# Patient Record
Sex: MALE | NOT HISPANIC OR LATINO | Employment: OTHER | URBAN - METROPOLITAN AREA
[De-identification: names, ages, dates, MRNs, and addresses within clinical notes are randomized per-mention and may not be internally consistent; named-entity substitution may affect disease eponyms.]

---

## 2025-02-28 ENCOUNTER — APPOINTMENT (OUTPATIENT)
Dept: RADIOLOGY | Facility: HOSPITAL | Age: 70
End: 2025-02-28
Payer: COMMERCIAL

## 2025-02-28 ENCOUNTER — HOSPITAL ENCOUNTER (OUTPATIENT)
Facility: HOSPITAL | Age: 70
Setting detail: OBSERVATION
Discharge: HOME | End: 2025-03-02
Attending: EMERGENCY MEDICINE | Admitting: INTERNAL MEDICINE
Payer: COMMERCIAL

## 2025-02-28 ENCOUNTER — APPOINTMENT (OUTPATIENT)
Dept: CARDIOLOGY | Facility: HOSPITAL | Age: 70
End: 2025-02-28
Payer: COMMERCIAL

## 2025-02-28 DIAGNOSIS — G47.33 OSA (OBSTRUCTIVE SLEEP APNEA): ICD-10-CM

## 2025-02-28 DIAGNOSIS — J44.1 CHRONIC OBSTRUCTIVE PULMONARY DISEASE WITH ACUTE EXACERBATION (MULTI): ICD-10-CM

## 2025-02-28 DIAGNOSIS — J96.11 CHRONIC RESPIRATORY FAILURE WITH HYPOXIA (MULTI): ICD-10-CM

## 2025-02-28 DIAGNOSIS — R07.9 CHEST PAIN, UNSPECIFIED TYPE: Primary | ICD-10-CM

## 2025-02-28 PROBLEM — I48.0 PAF (PAROXYSMAL ATRIAL FIBRILLATION) (MULTI): Status: ACTIVE | Noted: 2025-02-28

## 2025-02-28 PROBLEM — N18.30 STAGE 3 CHRONIC KIDNEY DISEASE (MULTI): Status: ACTIVE | Noted: 2025-02-28

## 2025-02-28 PROBLEM — I25.10 CAD (CORONARY ARTERY DISEASE): Status: ACTIVE | Noted: 2025-02-28

## 2025-02-28 PROBLEM — F41.9 ANXIETY: Status: ACTIVE | Noted: 2025-02-28

## 2025-02-28 PROBLEM — J44.9 COPD (CHRONIC OBSTRUCTIVE PULMONARY DISEASE) (MULTI): Status: ACTIVE | Noted: 2025-02-28

## 2025-02-28 PROBLEM — Z86.73 H/O: CVA (CEREBROVASCULAR ACCIDENT): Status: ACTIVE | Noted: 2025-02-28

## 2025-02-28 PROBLEM — Z86.718 H/O DEEP VENOUS THROMBOSIS: Status: ACTIVE | Noted: 2025-02-28

## 2025-02-28 PROBLEM — I50.30 (HFPEF) HEART FAILURE WITH PRESERVED EJECTION FRACTION: Status: ACTIVE | Noted: 2025-02-28

## 2025-02-28 PROBLEM — G40.909 SEIZURE DISORDER (MULTI): Status: ACTIVE | Noted: 2025-02-28

## 2025-02-28 LAB
ALBUMIN SERPL BCP-MCNC: 4 G/DL (ref 3.4–5)
ALP SERPL-CCNC: 91 U/L (ref 33–136)
ALT SERPL W P-5'-P-CCNC: 8 U/L (ref 10–52)
ANION GAP SERPL CALCULATED.3IONS-SCNC: 10 MMOL/L (ref 10–20)
AST SERPL W P-5'-P-CCNC: 14 U/L (ref 9–39)
BASOPHILS # BLD AUTO: 0.03 X10*3/UL (ref 0–0.1)
BASOPHILS NFR BLD AUTO: 0.5 %
BILIRUB SERPL-MCNC: 0.5 MG/DL (ref 0–1.2)
BNP SERPL-MCNC: 473 PG/ML (ref 0–99)
BUN SERPL-MCNC: 34 MG/DL (ref 6–23)
CALCIUM SERPL-MCNC: 9.2 MG/DL (ref 8.6–10.3)
CARDIAC TROPONIN I PNL SERPL HS: 16 NG/L (ref 0–20)
CARDIAC TROPONIN I PNL SERPL HS: 16 NG/L (ref 0–20)
CHLORIDE SERPL-SCNC: 99 MMOL/L (ref 98–107)
CO2 SERPL-SCNC: 34 MMOL/L (ref 21–32)
CREAT SERPL-MCNC: 1.34 MG/DL (ref 0.5–1.3)
EGFRCR SERPLBLD CKD-EPI 2021: 57 ML/MIN/1.73M*2
EOSINOPHIL # BLD AUTO: 0.12 X10*3/UL (ref 0–0.4)
EOSINOPHIL NFR BLD AUTO: 2 %
ERYTHROCYTE [DISTWIDTH] IN BLOOD BY AUTOMATED COUNT: 15.9 % (ref 11.5–14.5)
GLUCOSE SERPL-MCNC: 102 MG/DL (ref 74–99)
HCT VFR BLD AUTO: 42.8 % (ref 41–52)
HGB BLD-MCNC: 12.8 G/DL (ref 13.5–17.5)
IMM GRANULOCYTES # BLD AUTO: 0.01 X10*3/UL (ref 0–0.5)
IMM GRANULOCYTES NFR BLD AUTO: 0.2 % (ref 0–0.9)
LYMPHOCYTES # BLD AUTO: 0.6 X10*3/UL (ref 0.8–3)
LYMPHOCYTES NFR BLD AUTO: 10.2 %
MAGNESIUM SERPL-MCNC: 2.09 MG/DL (ref 1.6–2.4)
MCH RBC QN AUTO: 25.1 PG (ref 26–34)
MCHC RBC AUTO-ENTMCNC: 29.9 G/DL (ref 32–36)
MCV RBC AUTO: 84 FL (ref 80–100)
MONOCYTES # BLD AUTO: 0.65 X10*3/UL (ref 0.05–0.8)
MONOCYTES NFR BLD AUTO: 11 %
NEUTROPHILS # BLD AUTO: 4.48 X10*3/UL (ref 1.6–5.5)
NEUTROPHILS NFR BLD AUTO: 76.1 %
NRBC BLD-RTO: 0 /100 WBCS (ref 0–0)
PLATELET # BLD AUTO: 171 X10*3/UL (ref 150–450)
POTASSIUM SERPL-SCNC: 3.9 MMOL/L (ref 3.5–5.3)
PROT SERPL-MCNC: 6.2 G/DL (ref 6.4–8.2)
RBC # BLD AUTO: 5.09 X10*6/UL (ref 4.5–5.9)
SODIUM SERPL-SCNC: 139 MMOL/L (ref 136–145)
WBC # BLD AUTO: 5.9 X10*3/UL (ref 4.4–11.3)

## 2025-02-28 PROCEDURE — 71045 X-RAY EXAM CHEST 1 VIEW: CPT

## 2025-02-28 PROCEDURE — 93005 ELECTROCARDIOGRAM TRACING: CPT

## 2025-02-28 PROCEDURE — 96375 TX/PRO/DX INJ NEW DRUG ADDON: CPT | Mod: 59

## 2025-02-28 PROCEDURE — 93010 ELECTROCARDIOGRAM REPORT: CPT | Performed by: INTERNAL MEDICINE

## 2025-02-28 PROCEDURE — G0378 HOSPITAL OBSERVATION PER HR: HCPCS

## 2025-02-28 PROCEDURE — 2500000005 HC RX 250 GENERAL PHARMACY W/O HCPCS

## 2025-02-28 PROCEDURE — 2550000001 HC RX 255 CONTRASTS

## 2025-02-28 PROCEDURE — 99222 1ST HOSP IP/OBS MODERATE 55: CPT | Performed by: INTERNAL MEDICINE

## 2025-02-28 PROCEDURE — 80053 COMPREHEN METABOLIC PANEL: CPT

## 2025-02-28 PROCEDURE — 96374 THER/PROPH/DIAG INJ IV PUSH: CPT | Mod: 59

## 2025-02-28 PROCEDURE — 85025 COMPLETE CBC W/AUTO DIFF WBC: CPT

## 2025-02-28 PROCEDURE — 83880 ASSAY OF NATRIURETIC PEPTIDE: CPT | Performed by: EMERGENCY MEDICINE

## 2025-02-28 PROCEDURE — 71275 CT ANGIOGRAPHY CHEST: CPT

## 2025-02-28 PROCEDURE — 84484 ASSAY OF TROPONIN QUANT: CPT

## 2025-02-28 PROCEDURE — 2500000001 HC RX 250 WO HCPCS SELF ADMINISTERED DRUGS (ALT 637 FOR MEDICARE OP): Performed by: INTERNAL MEDICINE

## 2025-02-28 PROCEDURE — 36415 COLL VENOUS BLD VENIPUNCTURE: CPT

## 2025-02-28 PROCEDURE — 99285 EMERGENCY DEPT VISIT HI MDM: CPT | Mod: 25 | Performed by: EMERGENCY MEDICINE

## 2025-02-28 PROCEDURE — 2500000001 HC RX 250 WO HCPCS SELF ADMINISTERED DRUGS (ALT 637 FOR MEDICARE OP)

## 2025-02-28 PROCEDURE — 2500000002 HC RX 250 W HCPCS SELF ADMINISTERED DRUGS (ALT 637 FOR MEDICARE OP, ALT 636 FOR OP/ED): Performed by: INTERNAL MEDICINE

## 2025-02-28 PROCEDURE — 83735 ASSAY OF MAGNESIUM: CPT

## 2025-02-28 PROCEDURE — 2500000004 HC RX 250 GENERAL PHARMACY W/ HCPCS (ALT 636 FOR OP/ED)

## 2025-02-28 PROCEDURE — 96376 TX/PRO/DX INJ SAME DRUG ADON: CPT | Mod: 59

## 2025-02-28 PROCEDURE — 71045 X-RAY EXAM CHEST 1 VIEW: CPT | Performed by: RADIOLOGY

## 2025-02-28 RX ORDER — FUROSEMIDE 10 MG/ML
40 INJECTION INTRAMUSCULAR; INTRAVENOUS ONCE
Status: COMPLETED | OUTPATIENT
Start: 2025-02-28 | End: 2025-02-28

## 2025-02-28 RX ORDER — CARVEDILOL 3.12 MG/1
3.12 TABLET ORAL 2 TIMES DAILY
Status: DISCONTINUED | OUTPATIENT
Start: 2025-02-28 | End: 2025-03-02 | Stop reason: HOSPADM

## 2025-02-28 RX ORDER — SPIRONOLACTONE 50 MG/1
50 TABLET, FILM COATED ORAL DAILY
COMMUNITY

## 2025-02-28 RX ORDER — SPIRONOLACTONE 50 MG/1
50 TABLET, FILM COATED ORAL DAILY
Status: DISCONTINUED | OUTPATIENT
Start: 2025-03-01 | End: 2025-03-02 | Stop reason: HOSPADM

## 2025-02-28 RX ORDER — TRAMADOL HYDROCHLORIDE 50 MG/1
50 TABLET ORAL ONCE
Status: DISCONTINUED | OUTPATIENT
Start: 2025-02-28 | End: 2025-02-28

## 2025-02-28 RX ORDER — POTASSIUM CHLORIDE 20 MEQ/1
20 TABLET, EXTENDED RELEASE ORAL DAILY
COMMUNITY
End: 2025-03-02 | Stop reason: HOSPADM

## 2025-02-28 RX ORDER — ASPIRIN 81 MG/1
81 TABLET ORAL DAILY
Status: DISCONTINUED | OUTPATIENT
Start: 2025-03-01 | End: 2025-03-02 | Stop reason: HOSPADM

## 2025-02-28 RX ORDER — TORSEMIDE 10 MG/1
50 TABLET ORAL DAILY
COMMUNITY

## 2025-02-28 RX ORDER — FLUTICASONE PROPIONATE AND SALMETEROL 250; 50 UG/1; UG/1
1 POWDER RESPIRATORY (INHALATION)
COMMUNITY

## 2025-02-28 RX ORDER — ALLOPURINOL 300 MG/1
300 TABLET ORAL DAILY
COMMUNITY

## 2025-02-28 RX ORDER — ONDANSETRON HYDROCHLORIDE 2 MG/ML
4 INJECTION, SOLUTION INTRAVENOUS ONCE
Status: COMPLETED | OUTPATIENT
Start: 2025-02-28 | End: 2025-02-28

## 2025-02-28 RX ORDER — CARVEDILOL 3.12 MG/1
3.12 TABLET ORAL 2 TIMES DAILY
COMMUNITY

## 2025-02-28 RX ORDER — BUPROPION HYDROCHLORIDE 150 MG/1
150 TABLET ORAL DAILY
COMMUNITY

## 2025-02-28 RX ORDER — ACETAMINOPHEN 650 MG/1
650 SUPPOSITORY RECTAL EVERY 4 HOURS PRN
Status: DISCONTINUED | OUTPATIENT
Start: 2025-02-28 | End: 2025-03-02 | Stop reason: HOSPADM

## 2025-02-28 RX ORDER — GUAIFENESIN/DEXTROMETHORPHAN 100-10MG/5
5 SYRUP ORAL EVERY 4 HOURS PRN
Status: DISCONTINUED | OUTPATIENT
Start: 2025-02-28 | End: 2025-03-02 | Stop reason: HOSPADM

## 2025-02-28 RX ORDER — LOSARTAN POTASSIUM 50 MG/1
50 TABLET ORAL DAILY
Status: DISCONTINUED | OUTPATIENT
Start: 2025-03-01 | End: 2025-03-02 | Stop reason: HOSPADM

## 2025-02-28 RX ORDER — ATORVASTATIN CALCIUM 40 MG/1
40 TABLET, FILM COATED ORAL NIGHTLY
Status: DISCONTINUED | OUTPATIENT
Start: 2025-02-28 | End: 2025-03-02 | Stop reason: HOSPADM

## 2025-02-28 RX ORDER — ONDANSETRON 4 MG/1
4 TABLET, FILM COATED ORAL EVERY 8 HOURS PRN
Status: DISCONTINUED | OUTPATIENT
Start: 2025-02-28 | End: 2025-03-02 | Stop reason: HOSPADM

## 2025-02-28 RX ORDER — GUAIFENESIN 600 MG/1
600 TABLET, EXTENDED RELEASE ORAL EVERY 12 HOURS PRN
Status: DISCONTINUED | OUTPATIENT
Start: 2025-02-28 | End: 2025-03-02 | Stop reason: HOSPADM

## 2025-02-28 RX ORDER — BUPROPION HYDROCHLORIDE 150 MG/1
150 TABLET ORAL DAILY
Status: DISCONTINUED | OUTPATIENT
Start: 2025-03-01 | End: 2025-03-02 | Stop reason: HOSPADM

## 2025-02-28 RX ORDER — ALBUTEROL SULFATE 0.83 MG/ML
3 SOLUTION RESPIRATORY (INHALATION) EVERY 4 HOURS PRN
Status: DISCONTINUED | OUTPATIENT
Start: 2025-02-28 | End: 2025-03-02 | Stop reason: HOSPADM

## 2025-02-28 RX ORDER — LEVETIRACETAM 750 MG/1
750 TABLET ORAL 2 TIMES DAILY
COMMUNITY

## 2025-02-28 RX ORDER — ALUMINUM HYDROXIDE, MAGNESIUM HYDROXIDE, AND SIMETHICONE 1200; 120; 1200 MG/30ML; MG/30ML; MG/30ML
30 SUSPENSION ORAL ONCE
Status: COMPLETED | OUTPATIENT
Start: 2025-02-28 | End: 2025-02-28

## 2025-02-28 RX ORDER — POTASSIUM CHLORIDE 750 MG/1
10 TABLET, FILM COATED, EXTENDED RELEASE ORAL NIGHTLY
Status: DISCONTINUED | OUTPATIENT
Start: 2025-02-28 | End: 2025-03-02 | Stop reason: HOSPADM

## 2025-02-28 RX ORDER — POTASSIUM CHLORIDE 750 MG/1
10 TABLET, FILM COATED, EXTENDED RELEASE ORAL NIGHTLY
COMMUNITY

## 2025-02-28 RX ORDER — ACETAMINOPHEN 160 MG/5ML
650 SOLUTION ORAL EVERY 4 HOURS PRN
Status: DISCONTINUED | OUTPATIENT
Start: 2025-02-28 | End: 2025-03-02 | Stop reason: HOSPADM

## 2025-02-28 RX ORDER — LOSARTAN POTASSIUM 50 MG/1
50 TABLET ORAL DAILY
COMMUNITY

## 2025-02-28 RX ORDER — ATORVASTATIN CALCIUM 40 MG/1
40 TABLET, FILM COATED ORAL NIGHTLY
COMMUNITY

## 2025-02-28 RX ORDER — ALLOPURINOL 300 MG/1
300 TABLET ORAL DAILY
Status: DISCONTINUED | OUTPATIENT
Start: 2025-03-01 | End: 2025-03-02 | Stop reason: HOSPADM

## 2025-02-28 RX ORDER — HYDROMORPHONE HYDROCHLORIDE 1 MG/ML
1 INJECTION, SOLUTION INTRAMUSCULAR; INTRAVENOUS; SUBCUTANEOUS ONCE
Status: COMPLETED | OUTPATIENT
Start: 2025-02-28 | End: 2025-02-28

## 2025-02-28 RX ORDER — POLYETHYLENE GLYCOL 3350 17 G/17G
17 POWDER, FOR SOLUTION ORAL DAILY PRN
Status: DISCONTINUED | OUTPATIENT
Start: 2025-02-28 | End: 2025-03-02 | Stop reason: HOSPADM

## 2025-02-28 RX ORDER — ONDANSETRON HYDROCHLORIDE 2 MG/ML
4 INJECTION, SOLUTION INTRAVENOUS EVERY 8 HOURS PRN
Status: DISCONTINUED | OUTPATIENT
Start: 2025-02-28 | End: 2025-03-02 | Stop reason: HOSPADM

## 2025-02-28 RX ORDER — ACETAMINOPHEN 325 MG/1
650 TABLET ORAL EVERY 4 HOURS PRN
Status: DISCONTINUED | OUTPATIENT
Start: 2025-02-28 | End: 2025-03-02 | Stop reason: HOSPADM

## 2025-02-28 RX ORDER — LIDOCAINE HYDROCHLORIDE 20 MG/ML
15 SOLUTION OROPHARYNGEAL ONCE
Status: COMPLETED | OUTPATIENT
Start: 2025-02-28 | End: 2025-02-28

## 2025-02-28 RX ORDER — TRAZODONE HYDROCHLORIDE 100 MG/1
100 TABLET ORAL NIGHTLY
COMMUNITY

## 2025-02-28 RX ORDER — TRAZODONE HYDROCHLORIDE 100 MG/1
100 TABLET ORAL NIGHTLY
Status: DISCONTINUED | OUTPATIENT
Start: 2025-02-28 | End: 2025-03-02 | Stop reason: HOSPADM

## 2025-02-28 RX ORDER — MORPHINE SULFATE 4 MG/ML
4 INJECTION, SOLUTION INTRAMUSCULAR; INTRAVENOUS ONCE
Status: COMPLETED | OUTPATIENT
Start: 2025-02-28 | End: 2025-02-28

## 2025-02-28 RX ORDER — FAMOTIDINE 10 MG/ML
20 INJECTION, SOLUTION INTRAVENOUS ONCE
Status: COMPLETED | OUTPATIENT
Start: 2025-02-28 | End: 2025-02-28

## 2025-02-28 RX ORDER — FLUTICASONE FUROATE AND VILANTEROL 200; 25 UG/1; UG/1
1 POWDER RESPIRATORY (INHALATION)
Status: DISCONTINUED | OUTPATIENT
Start: 2025-03-01 | End: 2025-03-02 | Stop reason: HOSPADM

## 2025-02-28 RX ORDER — ASPIRIN 81 MG/1
81 TABLET ORAL DAILY
COMMUNITY

## 2025-02-28 RX ORDER — POLYETHYLENE GLYCOL 3350 17 G/17G
17 POWDER, FOR SOLUTION ORAL DAILY
COMMUNITY

## 2025-02-28 RX ORDER — IPRATROPIUM BROMIDE AND ALBUTEROL SULFATE 2.5; .5 MG/3ML; MG/3ML
3 SOLUTION RESPIRATORY (INHALATION) 4 TIMES DAILY PRN
COMMUNITY

## 2025-02-28 RX ORDER — TORSEMIDE 100 MG/1
50 TABLET ORAL DAILY
Status: DISCONTINUED | OUTPATIENT
Start: 2025-03-01 | End: 2025-03-01

## 2025-02-28 RX ADMIN — APIXABAN 5 MG: 5 TABLET, FILM COATED ORAL at 23:12

## 2025-02-28 RX ADMIN — LEVETIRACETAM 750 MG: 500 TABLET, FILM COATED ORAL at 23:12

## 2025-02-28 RX ADMIN — ALUMINUM HYDROXIDE, MAGNESIUM HYDROXIDE, AND DIMETHICONE 30 ML: 200; 20; 200 SUSPENSION ORAL at 19:12

## 2025-02-28 RX ADMIN — FUROSEMIDE 40 MG: 10 INJECTION, SOLUTION INTRAMUSCULAR; INTRAVENOUS at 21:13

## 2025-02-28 RX ADMIN — ATORVASTATIN CALCIUM 40 MG: 40 TABLET, FILM COATED ORAL at 23:11

## 2025-02-28 RX ADMIN — IOHEXOL 75 ML: 350 INJECTION, SOLUTION INTRAVENOUS at 21:45

## 2025-02-28 RX ADMIN — LIDOCAINE HYDROCHLORIDE 15 ML: 20 SOLUTION ORAL at 19:12

## 2025-02-28 RX ADMIN — MORPHINE SULFATE 4 MG: 4 INJECTION, SOLUTION INTRAMUSCULAR; INTRAVENOUS at 18:50

## 2025-02-28 RX ADMIN — ONDANSETRON 4 MG: 2 INJECTION, SOLUTION INTRAMUSCULAR; INTRAVENOUS at 18:50

## 2025-02-28 RX ADMIN — FAMOTIDINE 20 MG: 10 INJECTION, SOLUTION INTRAVENOUS at 19:12

## 2025-02-28 RX ADMIN — POTASSIUM CHLORIDE 10 MEQ: 750 TABLET, EXTENDED RELEASE ORAL at 23:12

## 2025-02-28 RX ADMIN — HYDROMORPHONE HYDROCHLORIDE 0.5 MG: 1 INJECTION, SOLUTION INTRAMUSCULAR; INTRAVENOUS; SUBCUTANEOUS at 22:35

## 2025-02-28 RX ADMIN — TRAZODONE HYDROCHLORIDE 100 MG: 100 TABLET ORAL at 23:12

## 2025-02-28 RX ADMIN — HYDROMORPHONE HYDROCHLORIDE 1 MG: 1 INJECTION, SOLUTION INTRAMUSCULAR; INTRAVENOUS; SUBCUTANEOUS at 19:55

## 2025-02-28 RX ADMIN — CARVEDILOL 3.12 MG: 3.12 TABLET, FILM COATED ORAL at 23:12

## 2025-02-28 SDOH — SOCIAL STABILITY: SOCIAL INSECURITY: ARE THERE ANY APPARENT SIGNS OF INJURIES/BEHAVIORS THAT COULD BE RELATED TO ABUSE/NEGLECT?: NO

## 2025-02-28 SDOH — SOCIAL STABILITY: SOCIAL INSECURITY: DOES ANYONE TRY TO KEEP YOU FROM HAVING/CONTACTING OTHER FRIENDS OR DOING THINGS OUTSIDE YOUR HOME?: NO

## 2025-02-28 SDOH — SOCIAL STABILITY: SOCIAL INSECURITY: HAVE YOU HAD ANY THOUGHTS OF HARMING ANYONE ELSE?: NO

## 2025-02-28 SDOH — SOCIAL STABILITY: SOCIAL INSECURITY: DO YOU FEEL UNSAFE GOING BACK TO THE PLACE WHERE YOU ARE LIVING?: NO

## 2025-02-28 SDOH — SOCIAL STABILITY: SOCIAL INSECURITY: ABUSE: ADULT

## 2025-02-28 SDOH — SOCIAL STABILITY: SOCIAL INSECURITY: DO YOU FEEL ANYONE HAS EXPLOITED OR TAKEN ADVANTAGE OF YOU FINANCIALLY OR OF YOUR PERSONAL PROPERTY?: NO

## 2025-02-28 SDOH — SOCIAL STABILITY: SOCIAL INSECURITY: WERE YOU ABLE TO COMPLETE ALL THE BEHAVIORAL HEALTH SCREENINGS?: YES

## 2025-02-28 SDOH — SOCIAL STABILITY: SOCIAL INSECURITY: HAVE YOU HAD THOUGHTS OF HARMING ANYONE ELSE?: NO

## 2025-02-28 SDOH — SOCIAL STABILITY: SOCIAL INSECURITY: ARE YOU OR HAVE YOU BEEN THREATENED OR ABUSED PHYSICALLY, EMOTIONALLY, OR SEXUALLY BY ANYONE?: NO

## 2025-02-28 SDOH — SOCIAL STABILITY: SOCIAL INSECURITY: HAS ANYONE EVER THREATENED TO HURT YOUR FAMILY OR YOUR PETS?: NO

## 2025-02-28 ASSESSMENT — LIFESTYLE VARIABLES
EVER HAD A DRINK FIRST THING IN THE MORNING TO STEADY YOUR NERVES TO GET RID OF A HANGOVER: NO
TOTAL SCORE: 0
HOW MANY STANDARD DRINKS CONTAINING ALCOHOL DO YOU HAVE ON A TYPICAL DAY: PATIENT DOES NOT DRINK
AUDIT-C TOTAL SCORE: 0
EVER FELT BAD OR GUILTY ABOUT YOUR DRINKING: NO
AUDIT-C TOTAL SCORE: 0
HAVE PEOPLE ANNOYED YOU BY CRITICIZING YOUR DRINKING: NO
HOW OFTEN DO YOU HAVE 6 OR MORE DRINKS ON ONE OCCASION: NEVER
HAVE YOU EVER FELT YOU SHOULD CUT DOWN ON YOUR DRINKING: NO
SKIP TO QUESTIONS 9-10: 1
HOW OFTEN DO YOU HAVE A DRINK CONTAINING ALCOHOL: NEVER

## 2025-02-28 ASSESSMENT — PAIN SCALES - GENERAL
PAINLEVEL_OUTOF10: 10 - WORST POSSIBLE PAIN
PAINLEVEL_OUTOF10: 10 - WORST POSSIBLE PAIN
PAINLEVEL_OUTOF10: 8

## 2025-02-28 ASSESSMENT — COGNITIVE AND FUNCTIONAL STATUS - GENERAL
DAILY ACTIVITIY SCORE: 24
MOBILITY SCORE: 24
PATIENT BASELINE BEDBOUND: NO

## 2025-02-28 ASSESSMENT — PAIN DESCRIPTION - DESCRIPTORS: DESCRIPTORS: ACHING;SHARP

## 2025-02-28 ASSESSMENT — PATIENT HEALTH QUESTIONNAIRE - PHQ9
2. FEELING DOWN, DEPRESSED OR HOPELESS: NOT AT ALL
SUM OF ALL RESPONSES TO PHQ9 QUESTIONS 1 & 2: 0
1. LITTLE INTEREST OR PLEASURE IN DOING THINGS: NOT AT ALL

## 2025-02-28 ASSESSMENT — HEART SCORE
RISK FACTORS: >2 RISK FACTORS OR HX OF ATHEROSCLEROTIC DISEASE
TROPONIN: LESS THAN OR EQUAL TO NORMAL LIMIT
HISTORY: MODERATELY SUSPICIOUS
AGE: 65+
RISK FACTORS: >2 RISK FACTORS OR HX OF ATHEROSCLEROTIC DISEASE
HISTORY: MODERATELY SUSPICIOUS
AGE: 65+
ECG: NORMAL
TROPONIN: LESS THAN OR EQUAL TO NORMAL LIMIT
HEART SCORE: 5
HEART SCORE: 5
ECG: NORMAL

## 2025-02-28 ASSESSMENT — ACTIVITIES OF DAILY LIVING (ADL)
HEARING - RIGHT EAR: FUNCTIONAL
BATHING: INDEPENDENT
HEARING - LEFT EAR: FUNCTIONAL
WALKS IN HOME: INDEPENDENT
TOILETING: INDEPENDENT
DRESSING YOURSELF: INDEPENDENT
ADEQUATE_TO_COMPLETE_ADL: YES
JUDGMENT_ADEQUATE_SAFELY_COMPLETE_DAILY_ACTIVITIES: YES
FEEDING YOURSELF: INDEPENDENT
GROOMING: INDEPENDENT
PATIENT'S MEMORY ADEQUATE TO SAFELY COMPLETE DAILY ACTIVITIES?: YES

## 2025-02-28 ASSESSMENT — COLUMBIA-SUICIDE SEVERITY RATING SCALE - C-SSRS
6. HAVE YOU EVER DONE ANYTHING, STARTED TO DO ANYTHING, OR PREPARED TO DO ANYTHING TO END YOUR LIFE?: NO
2. HAVE YOU ACTUALLY HAD ANY THOUGHTS OF KILLING YOURSELF?: NO
1. IN THE PAST MONTH, HAVE YOU WISHED YOU WERE DEAD OR WISHED YOU COULD GO TO SLEEP AND NOT WAKE UP?: NO

## 2025-02-28 ASSESSMENT — PAIN DESCRIPTION - PAIN TYPE: TYPE: ACUTE PAIN

## 2025-02-28 ASSESSMENT — PAIN DESCRIPTION - LOCATION: LOCATION: CHEST

## 2025-02-28 ASSESSMENT — PAIN - FUNCTIONAL ASSESSMENT
PAIN_FUNCTIONAL_ASSESSMENT: 0-10
PAIN_FUNCTIONAL_ASSESSMENT: 0-10

## 2025-02-28 NOTE — ED TRIAGE NOTES
BIBA for chest pain 9/10 since this morning radiating to L arm and L jaw. Hx 2 heart attacks, last one June 2023. Given 4 baby aspirin. Refused nitroglycerin d/t headaches with it

## 2025-02-28 NOTE — ED PROVIDER NOTES
HPI   Chief Complaint   Patient presents with    Chest Pain       Patient is a 69-year-old male presents emergency department for evaluation of chest pain.  Patient states chest pain started last night.  He describes it as a constant aching tightening pain in the left side of his chest.  He states the intensity of the pain fluctuates.  He states he has experienced pain before as he states he has had 2 heart attacks in the past.  He does admit to being on Eliquis.  He states nothing makes the pain better or worse.  He denies any fevers, chills, nausea, vomiting, abdominal pain.  He denies any recent travel or sick contacts.              Patient History   Past Medical History:   Diagnosis Date    DVT (deep venous thrombosis) (Multi)     Gout     History of pulmonary embolus (PE)     Hypertension     MI (myocardial infarction) (Multi)     Stroke (Multi)      Past Surgical History:   Procedure Laterality Date    MR HEAD ANGIO WO IV CONTRAST  7/7/2015    MR HEAD ANGIO WO IV CONTRAST [facility]    MR HEAD ANGIO WO IV CONTRAST  7/9/2015    MR HEAD ANGIO WO IV CONTRAST [facility]     No family history on file.  Social History     Tobacco Use    Smoking status: Former     Types: Cigarettes    Smokeless tobacco: Never   Substance Use Topics    Alcohol use: Not Currently    Drug use: Never       Physical Exam   ED Triage Vitals   Temp Pulse Resp BP   -- -- -- --      SpO2 Temp src Heart Rate Source Patient Position   -- -- -- --      BP Location FiO2 (%)     -- --       Physical Exam  Vitals and nursing note reviewed.   Constitutional:       Comments: Disheveled appearing   HENT:      Head: Normocephalic and atraumatic.      Nose: Nose normal.      Mouth/Throat:      Mouth: Mucous membranes are moist.   Eyes:      Extraocular Movements: Extraocular movements intact.      Pupils: Pupils are equal, round, and reactive to light.   Cardiovascular:      Rate and Rhythm: Normal rate and regular rhythm.    Pulmonary:      Effort: Pulmonary effort is normal.      Breath sounds: Normal breath sounds. No wheezing, rhonchi or rales.      Comments: Normal oxygen saturation on baseline 4 L  Abdominal:      Palpations: Abdomen is soft.      Tenderness: There is no abdominal tenderness. There is no guarding or rebound.   Musculoskeletal:         General: Normal range of motion.      Cervical back: Normal range of motion.   Skin:     General: Skin is warm and dry.      Comments: Color changes in the skin in the bilateral lower extremities consistent with PVD   Neurological:      General: No focal deficit present.      Mental Status: He is alert and oriented to person, place, and time.   Psychiatric:         Mood and Affect: Mood normal.         Behavior: Behavior normal.           ED Course & MDM   ED Course as of 02/28/25 2233 Fri Feb 28, 2025 1843 My EKG interpretation:  Sinus rhythm 69 bpm normal axis ME 1 200 QTc 503 no ectopy or acute ischemic changes noted [KW]      ED Course User Index  [KW] Gus Chris,          Diagnoses as of 02/28/25 2233   Chest pain, unspecified type   Chronic obstructive pulmonary disease with acute exacerbation (Multi)                 No data recorded     [place] Coma Scale Score: 15 (02/28/25 1922 : Noah Guevara RN) HEART Score: 5 (02/28/25 2233 : Tesha Cano PA-C)                         Medical Decision Making  **Disclaimer parts of this chart have been completed using voice recognition software. Please excuse any errors of transcription.     Patient seen in conjunction with attending physician .    HPI: Detailed above.    Exam: A medically appropriate exam performed, outlined above, given the known history and presentation.    History obtained from: Patient    EKG: Reviewed by myself.  Reviewed and interpreted by my attending physician.    Labs/Diagnostics:  Labs Reviewed   CBC WITH AUTO DIFFERENTIAL - Abnormal       Result Value    WBC 5.9      nRBC 0.0      RBC  5.09      Hemoglobin 12.8 (*)     Hematocrit 42.8      MCV 84      MCH 25.1 (*)     MCHC 29.9 (*)     RDW 15.9 (*)     Platelets 171      Neutrophils % 76.1      Immature Granulocytes %, Automated 0.2      Lymphocytes % 10.2      Monocytes % 11.0      Eosinophils % 2.0      Basophils % 0.5      Neutrophils Absolute 4.48      Immature Granulocytes Absolute, Automated 0.01      Lymphocytes Absolute 0.60 (*)     Monocytes Absolute 0.65      Eosinophils Absolute 0.12      Basophils Absolute 0.03     COMPREHENSIVE METABOLIC PANEL - Abnormal    Glucose 102 (*)     Sodium 139      Potassium 3.9      Chloride 99      Bicarbonate 34 (*)     Anion Gap 10      Urea Nitrogen 34 (*)     Creatinine 1.34 (*)     eGFR 57 (*)     Calcium 9.2      Albumin 4.0      Alkaline Phosphatase 91      Total Protein 6.2 (*)     AST 14      Bilirubin, Total 0.5      ALT 8 (*)    B-TYPE NATRIURETIC PEPTIDE - Abnormal     (*)     Narrative:        <100 pg/mL - Heart failure unlikely  100-299 pg/mL - Intermediate probability of acute heart                  failure exacerbation. Correlate with clinical                  context and patient history.    >=300 pg/mL - Heart Failure likely. Correlate with clinical                  context and patient history.    BNP testing is performed using different testing methodology at [facility] than at other [facility]. Direct result comparisons should only be made within the same method.      MAGNESIUM - Normal    Magnesium 2.09     SERIAL TROPONIN-INITIAL - Normal    Troponin I, High Sensitivity 16      Narrative:     Less than 99th percentile of normal range cutoff-  Female and children under 18 years old <14 ng/L; Male <21 ng/L: Negative  Repeat testing should be performed if clinically indicated.     Female and children under 18 years old 14-50 ng/L; Male 21-50 ng/L:  Consistent with possible cardiac damage and possible increased clinical   risk. Serial measurements may help to  assess extent of myocardial damage.     >50 ng/L: Consistent with cardiac damage, increased clinical risk and  myocardial infarction. Serial measurements may help assess extent of   myocardial damage.      NOTE: Children less than 1 year old may have higher baseline troponin   levels and results should be interpreted in conjunction with the overall   clinical context.     NOTE: Troponin I testing is performed using a different   testing methodology at [facility] than at other   [facility]. Direct result comparisons should only   be made within the same method.   SERIAL TROPONIN, 1 HOUR - Normal    Troponin I, High Sensitivity 16      Narrative:     Less than 99th percentile of normal range cutoff-  Female and children under 18 years old <14 ng/L; Male <21 ng/L: Negative  Repeat testing should be performed if clinically indicated.     Female and children under 18 years old 14-50 ng/L; Male 21-50 ng/L:  Consistent with possible cardiac damage and possible increased clinical   risk. Serial measurements may help to assess extent of myocardial damage.     >50 ng/L: Consistent with cardiac damage, increased clinical risk and  myocardial infarction. Serial measurements may help assess extent of   myocardial damage.      NOTE: Children less than 1 year old may have higher baseline troponin   levels and results should be interpreted in conjunction with the overall   clinical context.     NOTE: Troponin I testing is performed using a different   testing methodology at [facility] than at other   [facility]. Direct result comparisons should only   be made within the same method.   TROPONIN SERIES- (INITIAL, 1 HR)    Narrative:     The following orders were created for panel order Troponin I Series, High Sensitivity (0, 1 HR).  Procedure                               Abnormality         Status                     ---------                               -----------         ------                      Troponin I, High Sensiti...[[id number]]  Normal              Final result               Troponin, High Sensitivi...[[id number]]  Normal              Final result                 Please view results for these tests on the individual orders.     CT angio chest for pulmonary embolism   Final Result   1.  No evidence of pulmonary emboli to the proximal segmental level.   Evaluation of distal segmental and subsegmental branches is limited   due to mixing artifact.   2. Left-sided cardiomegaly. Enlarged main pulmonary trunk suggestive   of pulmonary arterial hypertension. Heavy coronary artery   atherosclerotic calcifications. Aneurysmal ascending aorta measuring   4.6 cm at the level of pulmonary trunk similar to prior.   3. Bibasilar bandlike and linear atelectasis.             Signed by: Denzel Garza 2/28/2025 10:22 PM   Dictation workstation:   [id number]      XR chest 1 view   Final Result   Cardiomegaly. Possible mild venous congestion. Low volume lungs.             Signed by: Leanne Becerra 2/28/2025 7:35 PM   Dictation workstation:   [id number]        EMERGENCY DEPARTMENT COURSE and DIFFERENTIAL DIAGNOSIS/MDM:  Patient is a 69-year-old male presenting to the emergency department for evaluation of chest pain.  On physical exam vital signs remarkable for hypertension but otherwise stable patient is in no acute distress.  Lung sounds clear auscultation bilaterally.  Patient has bilateral lower extremity swelling with signs of PVD.  He does have a history of ACS/CAD and therefore cardiac workup initiated.  BNP elevated at 473 therefore patient given 40 mg of IV Lasix.  Initial and repeat troponin 16.  CMP showed creatinine of 1.34 and EGFR of 57 which is consistent with previous labs.  Magnesium normal.  CBC showed no leukocytosis or anemia needing blood transfusion.  Chest x-ray showed cardiomegaly with venous congestion and low lung volumes.  CT angio of the chest requested by hospitalist.  CT angio showed no  "evidence of PE with findings consistent with pulmonary arterial hypertension and heavy coronary artery calcifications.  I spoke with hospitalist Dr. Fabian regarding the patient who agreed to admission to [facility] telemetry for further management of chest pain given patient heart score.    The patient presented with a chief complaint of chest pain. The differential diagnosis associated with this patient's presentation includes GERD, ACS, electrolyte abnormalities.     Vitals:    Vitals:    02/28/25 1829 02/28/25 1900 02/28/25 2000 02/28/25 2130   BP: (!) 149/93 143/89 115/60 125/78   Pulse: 70 66 66 78   Resp: 19 18 13 14   Temp: 36.8 °C (98.2 °F)      TempSrc: Temporal      SpO2: 96%  98% 96%   Weight: 147 kg (323 lb)      Height: 1.905 m (6' 3\")        History Limited by:    None    Independent history obtained from:    None    External records reviewed:    Inpatient Notes/Discharge Summary from previous hospitalization 1/10/2025 to determine patient's past medical history coronary artery disease CHF, iron deficiency anemia this insufficiency lower extremities    Diagnostics interpreted by me:    Xrays - see my independent interpretation in MDM    Discussions with other clinicians:    Hospitalist/Admitting Team Dr. Fabian    Chronic conditions impacting care:    Heart Disease    Social determinants of health affecting care:    None    Diagnostic tests considered but not performed: None    ED Medications managed:    Medications   traMADol (Ultram) tablet 50 mg (50 mg oral Not Given 2/28/25 1955)   HYDROmorphone (Dilaudid) injection 0.5 mg (has no administration in time range)   morphine injection 4 mg (4 mg intravenous Given 2/28/25 1850)   ondansetron (Zofran) injection 4 mg (4 mg intravenous Given 2/28/25 1850)   alum-mag hydroxide-simeth (Mylanta) 200-200-20 mg/5 mL oral suspension 30 mL (30 mL oral Given 2/28/25 1912)   lidocaine (Xylocaine) 2 % mouth solution 15 mL (15 mL oral Given 2/28/25 1912)   famotidine PF " (Pepcid) injection 20 mg (20 mg intravenous Given 2/28/25 1912)   HYDROmorphone (Dilaudid) injection 1 mg (1 mg intravenous Given 2/28/25 1955)   furosemide (Lasix) injection 40 mg (40 mg intravenous Given 2/28/25 2113)   iohexol (OMNIPaque) 350 mg iodine/mL solution 75 mL (75 mL intravenous Given 2/28/25 2145)       Prescription drugs considered:    None    Screenings:     HEART Score: 5          Procedure  Procedures     Tesha Cano PA-C  02/28/25 2428

## 2025-03-01 LAB
ALBUMIN SERPL BCP-MCNC: 4 G/DL (ref 3.4–5)
ALP SERPL-CCNC: 95 U/L (ref 33–136)
ALT SERPL W P-5'-P-CCNC: 8 U/L (ref 10–52)
ANION GAP SERPL CALCULATED.3IONS-SCNC: 9 MMOL/L (ref 10–20)
AST SERPL W P-5'-P-CCNC: 14 U/L (ref 9–39)
BASOPHILS # BLD AUTO: 0.04 X10*3/UL (ref 0–0.1)
BASOPHILS NFR BLD AUTO: 0.6 %
BILIRUB SERPL-MCNC: 0.5 MG/DL (ref 0–1.2)
BUN SERPL-MCNC: 29 MG/DL (ref 6–23)
CALCIUM SERPL-MCNC: 8.8 MG/DL (ref 8.6–10.3)
CHLORIDE SERPL-SCNC: 98 MMOL/L (ref 98–107)
CHOLEST SERPL-MCNC: 107 MG/DL (ref 0–199)
CHOLEST/HDLC SERPL: 3 {RATIO}
CO2 SERPL-SCNC: 37 MMOL/L (ref 21–32)
CREAT SERPL-MCNC: 1.29 MG/DL (ref 0.5–1.3)
EGFRCR SERPLBLD CKD-EPI 2021: 59 ML/MIN/1.73M*2
EOSINOPHIL # BLD AUTO: 0.17 X10*3/UL (ref 0–0.4)
EOSINOPHIL NFR BLD AUTO: 2.7 %
ERYTHROCYTE [DISTWIDTH] IN BLOOD BY AUTOMATED COUNT: 15.9 % (ref 11.5–14.5)
EST. AVERAGE GLUCOSE BLD GHB EST-MCNC: 105 MG/DL
GLUCOSE SERPL-MCNC: 95 MG/DL (ref 74–99)
HBA1C MFR BLD: 5.3 %
HCT VFR BLD AUTO: 45.4 % (ref 41–52)
HDLC SERPL-MCNC: 35.2 MG/DL
HGB BLD-MCNC: 13.2 G/DL (ref 13.5–17.5)
IMM GRANULOCYTES # BLD AUTO: 0.01 X10*3/UL (ref 0–0.5)
IMM GRANULOCYTES NFR BLD AUTO: 0.2 % (ref 0–0.9)
LDLC SERPL CALC-MCNC: 53 MG/DL
LYMPHOCYTES # BLD AUTO: 0.71 X10*3/UL (ref 0.8–3)
LYMPHOCYTES NFR BLD AUTO: 11.4 %
MCH RBC QN AUTO: 25.2 PG (ref 26–34)
MCHC RBC AUTO-ENTMCNC: 29.1 G/DL (ref 32–36)
MCV RBC AUTO: 87 FL (ref 80–100)
MONOCYTES # BLD AUTO: 0.68 X10*3/UL (ref 0.05–0.8)
MONOCYTES NFR BLD AUTO: 10.9 %
NEUTROPHILS # BLD AUTO: 4.64 X10*3/UL (ref 1.6–5.5)
NEUTROPHILS NFR BLD AUTO: 74.2 %
NON HDL CHOLESTEROL: 72 MG/DL (ref 0–149)
NRBC BLD-RTO: 0 /100 WBCS (ref 0–0)
PLATELET # BLD AUTO: 164 X10*3/UL (ref 150–450)
POTASSIUM SERPL-SCNC: 4.2 MMOL/L (ref 3.5–5.3)
PROT SERPL-MCNC: 6.6 G/DL (ref 6.4–8.2)
RBC # BLD AUTO: 5.23 X10*6/UL (ref 4.5–5.9)
SODIUM SERPL-SCNC: 140 MMOL/L (ref 136–145)
TRIGL SERPL-MCNC: 93 MG/DL (ref 0–149)
VLDL: 19 MG/DL (ref 0–40)
WBC # BLD AUTO: 6.3 X10*3/UL (ref 4.4–11.3)

## 2025-03-01 PROCEDURE — 94640 AIRWAY INHALATION TREATMENT: CPT

## 2025-03-01 PROCEDURE — 2500000005 HC RX 250 GENERAL PHARMACY W/O HCPCS: Performed by: NURSE PRACTITIONER

## 2025-03-01 PROCEDURE — 2500000004 HC RX 250 GENERAL PHARMACY W/ HCPCS (ALT 636 FOR OP/ED): Performed by: INTERNAL MEDICINE

## 2025-03-01 PROCEDURE — 2500000001 HC RX 250 WO HCPCS SELF ADMINISTERED DRUGS (ALT 637 FOR MEDICARE OP): Performed by: INTERNAL MEDICINE

## 2025-03-01 PROCEDURE — 2500000002 HC RX 250 W HCPCS SELF ADMINISTERED DRUGS (ALT 637 FOR MEDICARE OP, ALT 636 FOR OP/ED): Performed by: NURSE PRACTITIONER

## 2025-03-01 PROCEDURE — 85025 COMPLETE CBC W/AUTO DIFF WBC: CPT | Performed by: INTERNAL MEDICINE

## 2025-03-01 PROCEDURE — 99232 SBSQ HOSP IP/OBS MODERATE 35: CPT | Performed by: INTERNAL MEDICINE

## 2025-03-01 PROCEDURE — 80053 COMPREHEN METABOLIC PANEL: CPT | Performed by: INTERNAL MEDICINE

## 2025-03-01 PROCEDURE — 83036 HEMOGLOBIN GLYCOSYLATED A1C: CPT | Mod: WESLAB | Performed by: INTERNAL MEDICINE

## 2025-03-01 PROCEDURE — 99232 SBSQ HOSP IP/OBS MODERATE 35: CPT | Performed by: NURSE PRACTITIONER

## 2025-03-01 PROCEDURE — 96375 TX/PRO/DX INJ NEW DRUG ADDON: CPT

## 2025-03-01 PROCEDURE — 2500000002 HC RX 250 W HCPCS SELF ADMINISTERED DRUGS (ALT 637 FOR MEDICARE OP, ALT 636 FOR OP/ED): Performed by: INTERNAL MEDICINE

## 2025-03-01 PROCEDURE — G0378 HOSPITAL OBSERVATION PER HR: HCPCS

## 2025-03-01 PROCEDURE — 96376 TX/PRO/DX INJ SAME DRUG ADON: CPT

## 2025-03-01 PROCEDURE — 36415 COLL VENOUS BLD VENIPUNCTURE: CPT | Performed by: INTERNAL MEDICINE

## 2025-03-01 PROCEDURE — 80061 LIPID PANEL: CPT | Performed by: INTERNAL MEDICINE

## 2025-03-01 PROCEDURE — 2500000004 HC RX 250 GENERAL PHARMACY W/ HCPCS (ALT 636 FOR OP/ED): Performed by: NURSE PRACTITIONER

## 2025-03-01 PROCEDURE — 99223 1ST HOSP IP/OBS HIGH 75: CPT | Performed by: INTERNAL MEDICINE

## 2025-03-01 PROCEDURE — 2500000001 HC RX 250 WO HCPCS SELF ADMINISTERED DRUGS (ALT 637 FOR MEDICARE OP): Performed by: NURSE PRACTITIONER

## 2025-03-01 RX ORDER — HYDROMORPHONE HYDROCHLORIDE 1 MG/ML
1 INJECTION, SOLUTION INTRAMUSCULAR; INTRAVENOUS; SUBCUTANEOUS
Status: DISCONTINUED | OUTPATIENT
Start: 2025-03-01 | End: 2025-03-01

## 2025-03-01 RX ORDER — OXYCODONE AND ACETAMINOPHEN 5; 325 MG/1; MG/1
1 TABLET ORAL EVERY 6 HOURS PRN
Status: DISCONTINUED | OUTPATIENT
Start: 2025-03-01 | End: 2025-03-01

## 2025-03-01 RX ORDER — OXYCODONE AND ACETAMINOPHEN 5; 325 MG/1; MG/1
1 TABLET ORAL EVERY 4 HOURS PRN
Status: DISCONTINUED | OUTPATIENT
Start: 2025-03-01 | End: 2025-03-02 | Stop reason: HOSPADM

## 2025-03-01 RX ORDER — OXYCODONE AND ACETAMINOPHEN 5; 325 MG/1; MG/1
2 TABLET ORAL EVERY 4 HOURS PRN
Status: DISCONTINUED | OUTPATIENT
Start: 2025-03-01 | End: 2025-03-02 | Stop reason: HOSPADM

## 2025-03-01 RX ORDER — TORSEMIDE 100 MG/1
50 TABLET ORAL DAILY
Status: DISCONTINUED | OUTPATIENT
Start: 2025-03-01 | End: 2025-03-02 | Stop reason: HOSPADM

## 2025-03-01 RX ORDER — ALBUTEROL SULFATE 0.83 MG/ML
2.5 SOLUTION RESPIRATORY (INHALATION) 3 TIMES DAILY
Status: DISCONTINUED | OUTPATIENT
Start: 2025-03-01 | End: 2025-03-02 | Stop reason: HOSPADM

## 2025-03-01 RX ORDER — KETOROLAC TROMETHAMINE 30 MG/ML
15 INJECTION, SOLUTION INTRAMUSCULAR; INTRAVENOUS ONCE
Status: COMPLETED | OUTPATIENT
Start: 2025-03-01 | End: 2025-03-01

## 2025-03-01 RX ADMIN — LEVETIRACETAM 750 MG: 500 TABLET, FILM COATED ORAL at 08:52

## 2025-03-01 RX ADMIN — SPIRONOLACTONE 50 MG: 50 TABLET ORAL at 08:52

## 2025-03-01 RX ADMIN — ATORVASTATIN CALCIUM 40 MG: 40 TABLET, FILM COATED ORAL at 21:01

## 2025-03-01 RX ADMIN — POTASSIUM CHLORIDE 10 MEQ: 750 TABLET, EXTENDED RELEASE ORAL at 21:01

## 2025-03-01 RX ADMIN — OXYCODONE HYDROCHLORIDE AND ACETAMINOPHEN 1 TABLET: 5; 325 TABLET ORAL at 11:51

## 2025-03-01 RX ADMIN — OXYCODONE HYDROCHLORIDE AND ACETAMINOPHEN 2 TABLET: 5; 325 TABLET ORAL at 22:45

## 2025-03-01 RX ADMIN — APIXABAN 5 MG: 5 TABLET, FILM COATED ORAL at 08:52

## 2025-03-01 RX ADMIN — HYDROMORPHONE HYDROCHLORIDE 1 MG: 1 INJECTION, SOLUTION INTRAMUSCULAR; INTRAVENOUS; SUBCUTANEOUS at 01:28

## 2025-03-01 RX ADMIN — KETOROLAC TROMETHAMINE 15 MG: 30 INJECTION, SOLUTION INTRAMUSCULAR at 15:33

## 2025-03-01 RX ADMIN — LEVETIRACETAM 750 MG: 500 TABLET, FILM COATED ORAL at 21:01

## 2025-03-01 RX ADMIN — LOSARTAN POTASSIUM 50 MG: 50 TABLET, FILM COATED ORAL at 08:53

## 2025-03-01 RX ADMIN — HYDROMORPHONE HYDROCHLORIDE 1 MG: 1 INJECTION, SOLUTION INTRAMUSCULAR; INTRAVENOUS; SUBCUTANEOUS at 05:04

## 2025-03-01 RX ADMIN — CARVEDILOL 3.12 MG: 3.12 TABLET, FILM COATED ORAL at 08:52

## 2025-03-01 RX ADMIN — POLYETHYLENE GLYCOL 3350 17 G: 17 POWDER, FOR SOLUTION ORAL at 01:34

## 2025-03-01 RX ADMIN — ASPIRIN 81 MG: 81 TABLET, COATED ORAL at 08:52

## 2025-03-01 RX ADMIN — APIXABAN 5 MG: 5 TABLET, FILM COATED ORAL at 21:01

## 2025-03-01 RX ADMIN — ALBUTEROL SULFATE 2.5 MG: 2.5 SOLUTION RESPIRATORY (INHALATION) at 20:01

## 2025-03-01 RX ADMIN — TORSEMIDE 50 MG: 100 TABLET ORAL at 08:52

## 2025-03-01 RX ADMIN — ALLOPURINOL 300 MG: 300 TABLET ORAL at 08:52

## 2025-03-01 RX ADMIN — TRAZODONE HYDROCHLORIDE 100 MG: 100 TABLET ORAL at 21:01

## 2025-03-01 RX ADMIN — BUPROPION HYDROCHLORIDE 150 MG: 150 TABLET, EXTENDED RELEASE ORAL at 08:53

## 2025-03-01 RX ADMIN — Medication 4 L/MIN: at 20:09

## 2025-03-01 RX ADMIN — OXYCODONE HYDROCHLORIDE AND ACETAMINOPHEN 1 TABLET: 5; 325 TABLET ORAL at 14:11

## 2025-03-01 RX ADMIN — Medication 4 L/MIN: at 20:04

## 2025-03-01 RX ADMIN — OXYCODONE HYDROCHLORIDE AND ACETAMINOPHEN 2 TABLET: 5; 325 TABLET ORAL at 18:48

## 2025-03-01 RX ADMIN — ACETAMINOPHEN 650 MG: 325 TABLET, FILM COATED ORAL at 21:01

## 2025-03-01 ASSESSMENT — COGNITIVE AND FUNCTIONAL STATUS - GENERAL
MOBILITY SCORE: 24
DAILY ACTIVITIY SCORE: 24

## 2025-03-01 ASSESSMENT — PAIN SCALES - GENERAL
PAINLEVEL_OUTOF10: 8
PAINLEVEL_OUTOF10: 7
PAINLEVEL_OUTOF10: 7
PAINLEVEL_OUTOF10: 0 - NO PAIN
PAINLEVEL_OUTOF10: 8
PAINLEVEL_OUTOF10: 7
PAINLEVEL_OUTOF10: 10 - WORST POSSIBLE PAIN
PAINLEVEL_OUTOF10: 8
PAINLEVEL_OUTOF10: 10 - WORST POSSIBLE PAIN
PAINLEVEL_OUTOF10: 7
PAINLEVEL_OUTOF10: 3

## 2025-03-01 ASSESSMENT — PAIN - FUNCTIONAL ASSESSMENT
PAIN_FUNCTIONAL_ASSESSMENT: 0-10

## 2025-03-01 ASSESSMENT — PAIN DESCRIPTION - ORIENTATION
ORIENTATION: MID

## 2025-03-01 ASSESSMENT — PAIN DESCRIPTION - DESCRIPTORS
DESCRIPTORS: ACHING;SHARP
DESCRIPTORS: ACHING
DESCRIPTORS: ACHING;SHARP
DESCRIPTORS: ACHING
DESCRIPTORS: ACHING

## 2025-03-01 ASSESSMENT — PAIN DESCRIPTION - LOCATION
LOCATION: CHEST

## 2025-03-01 NOTE — H&P
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 Device (with Compatible Computer + Software) and as such may contain Grammatical Errors and Unintentional Typing Misprints.      I spent 38 minutes in the professional and overall care of this patient.      Ishmael Fabian MD

## 2025-03-01 NOTE — ASSESSMENT & PLAN NOTE
Slightly decompensated  Given IV Lasix in the ED  Lifestyle modification  Daily weights  Continue torsemide at 50 Mg daily  May take extra 50 mg if experiencing increased shortness of breath peripheral edema or weight gain of 3 pounds in higher over 24 hours per PCP  Continue spironolactone, losartan, Jardiance, monitor renal function, monitor electrolytes

## 2025-03-01 NOTE — ASSESSMENT & PLAN NOTE
Given IV Lasix in the ED  Continue torsemide at 50 Mg daily  Continue spironolactone, losartan, Jardiance, monitor renal function, monitor electrolytes

## 2025-03-01 NOTE — ASSESSMENT & PLAN NOTE
Admit patient to Telemetry service   Continuous Cardiac Monitor and BP Monitor Placement   Cardiology evaluation in AM.   Cardiac enzymes are negative (x 2 set) for acute MI.   Follow up second set CE (Tropinin + CK)   Monitor Electrolytes, Keep K+>4 + Mg++>2.   EKG reviewed  Will keep patient NPO  Continue High Intensity Statin in view of existing CAD   Defer repeat 2D-Echocardiography to evaluate for LVEF, Regional Wall motion abnormalities, and any Valvular defects.   Patient has allergy to nitroglycerin  May benefit from Ranexa as he has chronic chest pain.  Will defer to cardiology.  Patient is specifically requesting Dilaudid for his chest pain  [facility] report reviewed  Appears patient was last prescribed oxycodone 5 mg IR on November 12, 2024 for a total of 9 pills

## 2025-03-01 NOTE — PROGRESS NOTES
"Kapil MICHAEL Child is a 69 y.o. male on day 0 of admission presenting with Chest pain, unspecified type.    Subjective   This morning, patient was feeling and was asking to be discharged. Now, patient with increased pain, not getting much relief with oral percocet 5 mg. Denies shortness of breath, abdominal pain, fevers, chills. Wife at bedside, questions answered.        Objective     Physical Exam  Constitutional:       Appearance: Normal appearance.   HENT:      Head: Normocephalic and atraumatic.      Mouth/Throat:      Mouth: Mucous membranes are moist.      Pharynx: Oropharynx is clear.   Eyes:      Extraocular Movements: Extraocular movements intact.      Conjunctiva/sclera: Conjunctivae normal.      Pupils: Pupils are equal, round, and reactive to light.   Cardiovascular:      Rate and Rhythm: Normal rate and regular rhythm.      Pulses: Normal pulses.      Heart sounds: Normal heart sounds.   Pulmonary:      Effort: Tachypnea present.      Breath sounds: Normal breath sounds.   Abdominal:      General: Bowel sounds are normal.      Palpations: Abdomen is soft.      Tenderness: There is no abdominal tenderness.   Musculoskeletal:         General: Normal range of motion.      Cervical back: Normal range of motion and neck supple.   Skin:     General: Skin is warm and dry.      Capillary Refill: Capillary refill takes less than 2 seconds.   Neurological:      General: No focal deficit present.      Mental Status: He is alert and oriented to person, place, and time.   Psychiatric:         Mood and Affect: Mood normal.         Behavior: Behavior normal.         Last Recorded Vitals  Blood pressure (!) 107/96, pulse 73, temperature 36.7 °C (98.1 °F), temperature source Oral, resp. rate 20, height 1.905 m (6' 3\"), weight 147 kg (323 lb), SpO2 99%.  Intake/Output last 3 Shifts:  I/O last 3 completed shifts:  In: 120 (0.8 mL/kg) [P.O.:120]  Out: 1150 (7.8 mL/kg) [Urine:1150 (0.2 mL/kg/hr)]  Weight: 146.5 kg "     Relevant Results  Results for orders placed or performed during the hospital encounter of 02/28/25 (from the past 24 hours)   CBC and Auto Differential   Result Value Ref Range    WBC 5.9 4.4 - 11.3 x10*3/uL    nRBC 0.0 0.0 - 0.0 /100 WBCs    RBC 5.09 4.50 - 5.90 x10*6/uL    Hemoglobin 12.8 (L) 13.5 - 17.5 g/dL    Hematocrit 42.8 41.0 - 52.0 %    MCV 84 80 - 100 fL    MCH 25.1 (L) 26.0 - 34.0 pg    MCHC 29.9 (L) 32.0 - 36.0 g/dL    RDW 15.9 (H) 11.5 - 14.5 %    Platelets 171 150 - 450 x10*3/uL    Neutrophils % 76.1 40.0 - 80.0 %    Immature Granulocytes %, Automated 0.2 0.0 - 0.9 %    Lymphocytes % 10.2 13.0 - 44.0 %    Monocytes % 11.0 2.0 - 10.0 %    Eosinophils % 2.0 0.0 - 6.0 %    Basophils % 0.5 0.0 - 2.0 %    Neutrophils Absolute 4.48 1.60 - 5.50 x10*3/uL    Immature Granulocytes Absolute, Automated 0.01 0.00 - 0.50 x10*3/uL    Lymphocytes Absolute 0.60 (L) 0.80 - 3.00 x10*3/uL    Monocytes Absolute 0.65 0.05 - 0.80 x10*3/uL    Eosinophils Absolute 0.12 0.00 - 0.40 x10*3/uL    Basophils Absolute 0.03 0.00 - 0.10 x10*3/uL   Comprehensive metabolic panel   Result Value Ref Range    Glucose 102 (H) 74 - 99 mg/dL    Sodium 139 136 - 145 mmol/L    Potassium 3.9 3.5 - 5.3 mmol/L    Chloride 99 98 - 107 mmol/L    Bicarbonate 34 (H) 21 - 32 mmol/L    Anion Gap 10 10 - 20 mmol/L    Urea Nitrogen 34 (H) 6 - 23 mg/dL    Creatinine 1.34 (H) 0.50 - 1.30 mg/dL    eGFR 57 (L) >60 mL/min/1.73m*2    Calcium 9.2 8.6 - 10.3 mg/dL    Albumin 4.0 3.4 - 5.0 g/dL    Alkaline Phosphatase 91 33 - 136 U/L    Total Protein 6.2 (L) 6.4 - 8.2 g/dL    AST 14 9 - 39 U/L    Bilirubin, Total 0.5 0.0 - 1.2 mg/dL    ALT 8 (L) 10 - 52 U/L   Magnesium   Result Value Ref Range    Magnesium 2.09 1.60 - 2.40 mg/dL   Troponin I, High Sensitivity, Initial   Result Value Ref Range    Troponin I, High Sensitivity 16 0 - 20 ng/L   B-Type Natriuretic Peptide   Result Value Ref Range     (H) 0 - 99 pg/mL   Troponin, High Sensitivity, 1 Hour    Result Value Ref Range    Troponin I, High Sensitivity 16 0 - 20 ng/L   CBC and Auto Differential   Result Value Ref Range    WBC 6.3 4.4 - 11.3 x10*3/uL    nRBC 0.0 0.0 - 0.0 /100 WBCs    RBC 5.23 4.50 - 5.90 x10*6/uL    Hemoglobin 13.2 (L) 13.5 - 17.5 g/dL    Hematocrit 45.4 41.0 - 52.0 %    MCV 87 80 - 100 fL    MCH 25.2 (L) 26.0 - 34.0 pg    MCHC 29.1 (L) 32.0 - 36.0 g/dL    RDW 15.9 (H) 11.5 - 14.5 %    Platelets 164 150 - 450 x10*3/uL    Neutrophils % 74.2 40.0 - 80.0 %    Immature Granulocytes %, Automated 0.2 0.0 - 0.9 %    Lymphocytes % 11.4 13.0 - 44.0 %    Monocytes % 10.9 2.0 - 10.0 %    Eosinophils % 2.7 0.0 - 6.0 %    Basophils % 0.6 0.0 - 2.0 %    Neutrophils Absolute 4.64 1.60 - 5.50 x10*3/uL    Immature Granulocytes Absolute, Automated 0.01 0.00 - 0.50 x10*3/uL    Lymphocytes Absolute 0.71 (L) 0.80 - 3.00 x10*3/uL    Monocytes Absolute 0.68 0.05 - 0.80 x10*3/uL    Eosinophils Absolute 0.17 0.00 - 0.40 x10*3/uL    Basophils Absolute 0.04 0.00 - 0.10 x10*3/uL   Comprehensive metabolic panel   Result Value Ref Range    Glucose 95 74 - 99 mg/dL    Sodium 140 136 - 145 mmol/L    Potassium 4.2 3.5 - 5.3 mmol/L    Chloride 98 98 - 107 mmol/L    Bicarbonate 37 (H) 21 - 32 mmol/L    Anion Gap 9 (L) 10 - 20 mmol/L    Urea Nitrogen 29 (H) 6 - 23 mg/dL    Creatinine 1.29 0.50 - 1.30 mg/dL    eGFR 59 (L) >60 mL/min/1.73m*2    Calcium 8.8 8.6 - 10.3 mg/dL    Albumin 4.0 3.4 - 5.0 g/dL    Alkaline Phosphatase 95 33 - 136 U/L    Total Protein 6.6 6.4 - 8.2 g/dL    AST 14 9 - 39 U/L    Bilirubin, Total 0.5 0.0 - 1.2 mg/dL    ALT 8 (L) 10 - 52 U/L   Lipid panel   Result Value Ref Range    Cholesterol 107 0 - 199 mg/dL    HDL-Cholesterol 35.2 mg/dL    Cholesterol/HDL Ratio 3.0     LDL Calculated 53 <=99 mg/dL    VLDL 19 0 - 40 mg/dL    Triglycerides 93 0 - 149 mg/dL    Non HDL Cholesterol 72 0 - 149 mg/dL     CT angio chest for pulmonary embolism    Result Date: 2/28/2025  Interpreted By:  Denzel Garza, STUDY: CT  ANGIO CHEST FOR PULMONARY EMBOLISM;  2/28/2025 9:50 pm   INDICATION: Signs/Symptoms:rule out PE.   COMPARISON: Chest CT 06/24/2022.   ACCESSION NUMBER(S): [id number]   ORDERING CLINICIAN: DAPHNE CARTER   TECHNIQUE: Helical data acquisition of the chest was obtained after administration of intravenous contrast as part of pulmonary CT angiography protocol.   Axial contiguous images were reformatted in coronal and sagittal planes. Axial and coronal MIP images were created and reviewed.   FINDINGS: POTENTIAL LIMITATIONS OF THE STUDY: Motion and mixing artifact which limits evaluation of the distal branch vessels.   HEART AND VESSELS: No discrete filling defects within the main pulmonary artery or its branches.   Main pulmonary trunk is enlarged measuring 3.9 cm suggestive of pulmonary arterial hypertension.   The thoracic aorta is aneurysmal measuring 4.6 cm at the level of pulmonary trunk and 4.3 cm at the level of aortic root.   Heavy coronary artery calcifications are seen.The study is not optimized for evaluation of coronary arteries.   There is left-sided cardiomegaly with left ventricular and atrial enlargement similar to prior.   No evidence of pericardial effusion.   MEDIASTINUM AND NAZARIO, LOWER NECK AND AXILLA: The visualized thyroid gland is within normal limits.   No evidence of thoracic lymphadenopathy by CT criteria.   Esophagus appears within normal limits as seen.   LUNGS AND AIRWAYS: The trachea and central airways are patent. No endobronchial lesion.   Bibasilar subsegmental linear and band like atelectasis. There is no pleural effusion or pneumothorax.   UPPER ABDOMEN: Cholecystectomy change.   CHEST WALL AND OSSEOUS STRUCTURES: There are no suspicious osseous lesions there is mild endplate degenerative change throughout thoracic spine. Mild chronic T4 compression deformity..       1.  No evidence of pulmonary emboli to the proximal segmental level. Evaluation of distal segmental and subsegmental  branches is limited due to mixing artifact. 2. Left-sided cardiomegaly. Enlarged main pulmonary trunk suggestive of pulmonary arterial hypertension. Heavy coronary artery atherosclerotic calcifications. Aneurysmal ascending aorta measuring 4.6 cm at the level of pulmonary trunk similar to prior. 3. Bibasilar bandlike and linear atelectasis.     Signed by: Denzel Garza 2/28/2025 10:22 PM Dictation workstation:   [id number]    XR chest 1 view    Result Date: 2/28/2025  Interpreted By:  Leanne Becerra, STUDY: XR CHEST 1 VIEW; ;  2/28/2025 7:13 pm   INDICATION: Signs/Symptoms:chest pain.   COMPARISON: 04/28/2023   ACCESSION NUMBER(S): [id number]   ORDERING CLINICIAN: DAPHNE CARTER   TECHNIQUE: Portable chest   FINDINGS: Low volume lungs. Mildly prominent venous interstitium. No consolidation, pleural effusion, or pneumothorax. Surgical changes in the right shoulder. Healed left clavicle fracture.. Enlarged heart size and mediastinal contours are unchanged, tortuous aorta.       Cardiomegaly. Possible mild venous congestion. Low volume lungs.     Signed by: Leanne Becerra 2/28/2025 7:35 PM Dictation workstation:   [id number]       Assessment/Plan   Assessment & Plan  Chest pain, unspecified type  Monitor telemetry   Cardiology consult. Appreciate recs; signed off   Cardiac enzymes are negative (x 2 set).  Continue statin   Patient intolerant to nitroglycerin  [facility] report reviewed     (HFpEF) heart failure with preserved ejection fraction  Given IV Lasix in the ED  Continue torsemide at 50 Mg daily  Continue spironolactone, losartan, Jardiance, monitor renal function, monitor electrolytes    Chronic respiratory failure with hypoxia (Multi)  4 lpm at baseline   Stable     CAD (coronary artery disease)  NSTEMI 6/2023 s/p PCI of OM1 and OM2    Most recent stress test without stress-induced ischemia  Continue medical management with aspirin, atorvastatin  Cardiology consulted     Stage 3 chronic kidney disease (Multi)  Avoid  nephrotoxic agents    COPD (chronic obstructive pulmonary disease) (Multi)  Continue current inhaler regimen    PAF (paroxysmal atrial fibrillation) (Multi)  Rate controlled   Continue Coreg   Eliquis for stroke prophylaxis     Anxiety  Continue Wellbutrin 300 mg daily     Seizure disorder (Multi)  Seizure precautions  Continue Keppra 750 mg twice daily     H/O: CVA (cerebrovascular accident)  Continue DOAC for stroke prophylaxis    RASHID (obstructive sleep apnea)  Wears BIPAP at bedtime at home  Wife brought home machine, but mask appears damaged   Request respiratory to bring machine to bedside     H/O deep venous thrombosis  Continue DOAC for VTE treatment      Lorraine Felix, APRN-CNP

## 2025-03-01 NOTE — ASSESSMENT & PLAN NOTE
NSTEMI 6/2023 s/p PCI of OM1 and OM2    Status post stent  Most recent stress test without stress-induced ischemia  Continue medical management with aspirin, atorvastati

## 2025-03-01 NOTE — ASSESSMENT & PLAN NOTE
Wears BIPAP at bedtime at home  Wife brought home machine, but mask appears damaged   Request respiratory to bring machine to bedside

## 2025-03-01 NOTE — CONSULTS
Inpatient consult to Cardiology  Consult performed by: Elizabeth Parsons PA-C  Consult ordered by: Ishmael Fabian MD  Reason for consult: chest pain      History Of Present Illness:    Kapil MICHAEL Child is a 69 y.o. male presenting with past medical history of paroxysmal atrial fibrillation on Eliquis, MI 6/2023 s/p PCI of OM1 and OM2, dyslipidemia, COPD, chronic kidney disease stage III, coronary artery disease, Seizure disorder, chronic respiratory failure with hypoxia, with 9/10 inferior central chest pain radiating to his left arm and shoulder.  Patient reports his pain was worse with inhalation.  Troponins were negative, flat, 16,16, BNP mildly elevated 473. EKG taken in the ER shows sinus rhythm no signs of ischemia, heart rate 69, widening of the QRS in aVL.  CT angiogram demonstrated left-sided cardiomegaly, enlarged main pulmonary trunk suggestive of pulmonary arterial hypertension, heavy coronary artery atherosclerosis calcifications, and basilar bandlike and linear atelectasis, no pulmonary emboli.  Patient is a former smoker, quit in 1991. Cardiology was consulted for chest pain.     Last Recorded Vitals:  Vitals:    02/28/25 2000 02/28/25 2130 02/28/25 2346 03/01/25 0800   BP: 115/60 125/78 (!) 145/98 108/56   BP Location:   Left arm Right arm   Patient Position:   Sitting Lying   Pulse: 66 78 110 94   Resp: 13 14 18 18   Temp:   36.6 °C (97.9 °F) 36.2 °C (97.2 °F)   TempSrc:   Oral Oral   SpO2: 98% 96% 94% 95%   Weight:       Height:           Last Labs:  CBC - 3/1/2025:  5:47 AM  6.3 13.2 164    45.4      CMP - 3/1/2025:  5:47 AM  8.8 6.6 14 --- 0.5   _ 4.0 8 95      PTT - No results in last year.  _   _ _     Troponin I, High Sensitivity   Date/Time Value Ref Range Status   02/28/2025 07:50 PM 16 0 - 20 ng/L Final   02/28/2025 06:46 PM 16 0 - 20 ng/L Final     BNP   Date/Time Value Ref Range Status   02/28/2025 06:46  (H) 0 - 99 pg/mL Final     Hemoglobin A1C   Date/Time Value Ref Range Status    07/10/2024 02:13 PM 5.6 4.3 - 5.6 % Final     Comment:     American Diabetes Association guidelines indicate that patients with HgbA1c in the range 5.7-6.4% are at increased risk for development of diabetes, and intervention by lifestyle modification may be beneficial. HgbA1c greater or equal to 6.5% is considered diagnostic of diabetes.   07/14/2023 03:14 PM 5.2 4.3 - 5.6 % Final     Comment:     American Diabetes Association guidelines indicate that patients with HgbA1c in the range 5.7-6.4% are at increased risk for development of diabetes, and intervention by lifestyle modification may be beneficial. HgbA1c greater or equal to 6.5% is considered diagnostic of diabetes.     LDL Calculated   Date/Time Value Ref Range Status   03/01/2025 05:47 AM 53 <=99 mg/dL Final     Comment:                                 Near   Borderline      AGE      Desirable  Optimal    High     High     Very High     0-19 Y     0 - 109     ---    110-129   >/= 130     ----    20-24 Y     0 - 119     ---    120-159   >/= 160     ----      >24 Y     0 -  99   100-129  130-159   160-189     >/=190     06/25/2022 04:00 AM 67 65 - 130 MG/DL Final   07/23/2019 09:25 AM 61 (L) 65 - 130 MG/DL Final   05/23/2018 06:51  65 - 130 MG/DL Final     VLDL   Date/Time Value Ref Range Status   03/01/2025 05:47 AM 19 0 - 40 mg/dL Final      Last I/O:  I/O last 3 completed shifts:  In: 120 (0.8 mL/kg) [P.O.:120]  Out: 1150 (7.8 mL/kg) [Urine:1150 (0.2 mL/kg/hr)]  Weight: 146.5 kg     Past Cardiology Tests (Last 3 Years):  EKG:  Preliminary review of EKG shows sinus rhythm no signs of ischemia, heart rate 69, widening of the QRS in aVL.    Echo:  Impression  Performed by [facility]  CONCLUSIONS:  - Technically difficult exam due to sitting upright in chair and body habitus.  Limited views may affect conclusions.  - Exam indication: Evaluation of known heart failure to guide therapy  - The left ventricle is normal in size. There is  asymmetric left ventricular  hypertrophy. Left ventricular systolic function is normal. EF = 55 ± 5% (visual  est.) Left ventricular diastolic function was not evaluated due to inconsistent or   technically suboptimal data.  - The right ventricle is normal in size. Right ventricular systolic function is  normal.  - There is mild (1+) mitral valve regurgitation.  - There is aortic sclerosis without signifcant stenosis.  - Estimated right ventricular systolic pressure is not reported due to an  insufficient tricuspid regurgitation signal. Estimated right atrial pressure is  not included as the IVC was not seen.  - There is beat to beat variability in LVEF due to arrhythmia.  - There are no significant valvular abnormalities.  - No IV access.  - Exam was compared with the prior CC echocardiographic exam performed on  10- ([place]), similar findings.      Electronically signed by RAVI Mckinney MD on 2024 at 4:46:28 PM      * * * Final * * *  Narrative  Performed by [facility] CARDIOPULMONARY        Echocardiography Report: Transthoracic Echo  [facility]  Date of service: 2024 12:59:31 PM  Accession #: [id number]    Ordering physician: HANNAH NAVARRO  Indication: Evaluation of known heart failure to guide therapy  Symptom(s): Shortness of breath    Technologist: Melissa Garcia [facility]  Interpreting physician: RAVI Mckinney MD    PATIENT:  Name: MR. DANN MICHAEL CHILD  MRN: [id number]  : 1955  Age: 69 years  Gender: M    History of hypertension, coronary artery disease, dyslipidemia, chronic kidney  disease, COPD, pulmonary embolism and myocardial infarction.  Previous cardiovascular interventions:  PCI ()    Primary rhythm: AV Block. Secondary rhythm: PVC.  Height: 193.00 cm BSA: 2.77 m²  Weight: 142.88 kg BMI: 38.4 kg/m²      Heart rate     74 bpm  Blood pressure 153/81 mmHg    Technically difficult exam due to sitting upright in chair and body habitus.  Color Doppler was utilized to  interrogate the cardiac valves assessed and spectral   Doppler was utilized to determine the flow velocities and pressure gradients  reported in this exam.    MEASUREMENTS:                           Value              Indexed    Normal  Max aortic dimension     3.7 cm                        Ao < 3.8  LV ID (diastole)         5.3 cm (2D)        1.93 cm/m²  IVS, leaflet tips        3.3 cm (2D)  Posterior wall thickness 1.7 cm (2D)  Left ventricular mass    780 g (2D)         282 g/m²  Ejection Fraction        55 % (visual est.)            EF > 52    FINDINGS:    LEFT VENTRICLE  The left ventricle is normal in size.  There is asymmetric left ventricular hypertrophy.  Left ventricular systolic function is normal globally.  Left ventricular diastolic function was not evaluated due to inconsistent or  technically suboptimal data.  Mitral annular lateral E/e': 12.4. Mitral annular septal E/e': 12.6.  Wall Motion:  All scored segments are normal.    RIGHT VENTRICLE  The right ventricle is normal in size.  Right ventricular systolic function is normal. RV systolic tissue Doppler velocity   is 18.5 cm/s. Tricuspid annular displacement is 1.3 cm.  Estimated right ventricular systolic pressure is not reported due to an  insufficient tricuspid regurgitation signal. Estimated right atrial pressure is  not included as the IVC was not seen.    LEFT ATRIUM  Unable to reliably measure LA volume due to technical limitations.    RIGHT ATRIUM  Unable to reliably measure RA volume due to technical limitations.    MITRAL VALVE  The mitral valve leaflets are structurally normal. There is mitral annular  calcification observed posterior. There is mild (1+) mitral valve regurgitation.  The pressure half time is 31 msec. The peak mitral E/A ratio is 1.01. The mitral  flow deceleration time is 106 msec.    TRICUSPID VALVE  The tricuspid valve leaflets are structurally normal. There is no tricuspid valve  regurgitation.    AORTIC VALVE  There  "is no aortic valve regurgitation. There is mild calcification. The LVOT  diameter is 2.0 cm.    PULMONIC VALVE  The pulmonic valve was not seen or not interrogated.    AORTA  The visualized aorta is normal in size.  Measurements - Sinus: 3.7 cm. Sinotubular junction 3.4 cm. Mid ascending aorta 3.7   cm.    PULMONARY ARTERIES  The pulmonary arteries are unseen or not interrogated.    INTERATRIAL SEPTUM  The interatrial septum is unseen or not interrogated.    PERICARDIUM  There is no pericardial effusion. There is an epicardial fat pad.  Specimen Collected: 12/19/24 12:59    Performed by: [facility] Last Resulted: 12/19/24 16:46   Received From: [facility]  Result Received: 02/28/25 18:24        Ejection Fractions:  No results found for: \"EF\"  Cath:  No results found for this or any previous visit from the past 1095 days.    Stress Test:  Nuclear Stress Test 10/05/2023    Cardiac Imaging:  No results found for this or any previous visit from the past 1095 days.      Past Medical History:  He has a past medical history of DVT (deep venous thrombosis) (Multi), Gout, History of pulmonary embolus (PE), Hypertension, MI (myocardial infarction) (Multi), and Stroke (Multi).    Past Surgical History:  He has a past surgical history that includes MR angio head wo IV contrast (7/7/2015) and MR angio head wo IV contrast (7/9/2015).      Social History:  He reports that he has quit smoking. His smoking use included cigarettes. He has never used smokeless tobacco. He reports that he does not currently use alcohol. He reports that he does not use drugs.    Family History:  No family history on file.     Allergies:  Cephalosporins, Codeine, Penicillins, Tramadol, and Adhesive tape-silicones    Inpatient Medications:  Scheduled medications   Medication Dose Route Frequency    allopurinol  300 mg oral Daily    apixaban  5 mg oral BID    aspirin  81 mg oral Daily    atorvastatin  40 mg oral Nightly    buPROPion XL  " 150 mg oral Daily    carvedilol  3.125 mg oral BID    fluticasone furoate-vilanteroL  1 puff inhalation Daily    levETIRAcetam  750 mg oral BID    losartan  50 mg oral Daily    potassium chloride CR  10 mEq oral Nightly    spironolactone  50 mg oral Daily    torsemide  50 mg oral Daily    traZODone  100 mg oral Nightly     PRN medications   Medication    acetaminophen    Or    acetaminophen    Or    acetaminophen    albuterol    benzocaine-menthol    dextromethorphan-guaifenesin    guaiFENesin    HYDROmorphone    ondansetron    Or    ondansetron    polyethylene glycol     Continuous Medications   Medication Dose Last Rate     Outpatient Medications:  Current Outpatient Medications   Medication Instructions    albuterol sulfate (Proair Digihaler) 90 mcg/actuation aero powdr breath act w/sensor inhaler 2 puffs, inhalation, Every 4 hours PRN    allopurinol (ZYLOPRIM) 300 mg, oral, Daily    apixaban (ELIQUIS) 5 mg, oral, 2 times daily    aspirin 81 mg, oral, Daily    atorvastatin (LIPITOR) 40 mg, oral, Nightly    buPROPion XL (WELLBUTRIN XL) 150 mg, oral, Daily, Do not crush, chew, or split.    carvedilol (COREG) 3.125 mg, oral, 2 times daily    empagliflozin (JARDIANCE) 10 mg, oral, Daily    fluticasone propion-salmeteroL (Advair Diskus) 250-50 mcg/dose diskus inhaler 1 puff, inhalation, 2 times daily RT, Rinse mouth with water after use to reduce aftertaste and incidence of candidiasis. Do not swallow.    ipratropium-albuteroL (Duo-Neb) 0.5-2.5 mg/3 mL nebulizer solution 3 mL, nebulization, 4 times daily PRN    levETIRAcetam (KEPPRA) 750 mg, oral, 2 times daily    losartan (COZAAR) 50 mg, oral, Daily    polyethylene glycol (GLYCOLAX, MIRALAX) 17 g, oral, Daily    potassium chloride CR 10 mEq ER tablet 10 mEq, oral, Nightly, Do not crush, chew, or split.    potassium chloride CR 20 mEq ER tablet 20 mEq, oral, Daily, Do not crush or chew.    spironolactone (ALDACTONE) 50 mg, oral, Daily    torsemide (DEMADEX) 50 mg,  oral, Daily    traZODone (DESYREL) 100 mg, oral, Nightly       Physical Exam:  Appearance: Alert, oriented, cooperative, morbidly obese    Eyes: Conjunctiva pink with no redness or exudates. Eyelids without lesions. No scleral icterus.     ENT: Hearing grossly intact. External inspection of ears without lesions or erythema. no nasal flaring. no tripoding, no drooling, no difficulty swallowing oral secretions.    Pulmonary: Decreased breath sounds in all lobes no rales, rhonchi or wheezing. No accessory muscle use or stridor. No difficulty completing a full sentence without taking a breath    Cardiac: Regular rhythm and rate, no rub, gallop. No JVD-difficulty to assess due to obesity, no sternal discomfort to palpations    Musculoskeletal: No cyanosis, clubbing. no Lower extremity edema bilateral    Neurological: no focal findings identified.    Psychiatric: Appropriate mood and affect.       Assessment/Plan     Heart failure with preserved ejection fraction  Paroxysmal atrial fibrillation on Eliquis  Coronary artery disease  Hypertension  NSTEMI 6/2023 s/p PCI of OM1 and OM2   Chronic Kidney Disease stage 3  COPD  Seizure disorder  History of CVA   Obstructive sleep apnea  History of DVT    Assessment and plan:     3/1: As above patient presented to the ER with 9 out of 10 low sternal chest pain with radiation to left arm and shoulder.  Due to patient's chronic respiratory failure with hypoxia he uses 4 L of oxygen at baseline via nasal cannula.  Patient was most recently hospitalized on 1/6-1/10 for acute on chronic hypoxic respiratory failure and chronic diastolic heart failure, patient's troponin levels at that time were flat but elevated 49, 46, 47, proBNP 1754.  Patient's BNP today 473.  No new signs of ischemia on EKG, sinus rhythm, LBBB consistent with patients past EKG's.   Patient's last echocardiogram through the [facility] was on 12/19/2024 which demonstrated a ejection fraction of 55%, no  significant valvular abnormalities, left ventricle normal size, with asymmetric hypertrophy, and normal systolic function, right ventricle normal size and systolic function. Patient had an echocardiogram stress test on 7/25/2024, no abnormalities were found, no scintigraphic evidence for inducible ischemia.    In outpatient setting patient is on guideline directed therapy with spironolactone 50 mg, losartan 50 mg, carvedilol 3.125 mg twice daily, aspirin 81 mg, Eliquis for stroke prophylaxis in the setting of atrial fibrillation, atorvastatin 40 mg, potassium chloride, and torsemide 50 mg daily, and Jardiance.  Patient's labs reviewed this morning show sodium 140, potassium 4.2, creatinine 1.29, improved from day prior, hemoglobin 13.2, hematocrit 45.4.  Patient is normotensive this morning, blood pressure 108/56, heart rate 94.  Patient reports his chest pain has significantly improved, currently only present with deep inhalation. Patient requests discharge. Patient follows cardiology in the outpatient setting with Dr. Jones Viera at the [facility]. At this time there are no indications on imaging, EKG, or lab work to suggest acute coronary syndrome.  Patient requested pain medication, Dilaudid, at this time there are no indications patient's chest pain is cardiac in nature, pain is only present with deep inhalation, considered pleurisy, musculoskeletal/ costochondritis as possible cause.  We will defer to primary team for pain control.  Patient is considered stable from cardiac perspective at this time. We will sign off at this time.  Please do not hesitate to reach out with any further concerns.        Peripheral IV 02/28/25 20 G Left Forearm (Active)   Site Assessment Intact;Clean;Dry 03/01/25 0856   Dressing Status Occlusive;Dry;Clean 03/01/25 0856   Number of days: 1       Code Status:  Full Code    I spent 60 minutes in the professional and overall care of this patient.        Elizabeth Parsons PA-C

## 2025-03-01 NOTE — NURSING NOTE
Lengthy discussion with wife, Monica  she voices concerns about not having bipap and his home use split mask   noticed today and asking CO2

## 2025-03-01 NOTE — NURSING NOTE
Stephen was sleeping  woke  him up to take am meds  Needed direction with taking am meds sleepy discussed plan of care    and will ask md for something for pain that will not make him so sleepy call light in reach bed alarm on for safety   When asked about pain he told me that he has chest pain  but then fell right back to sleep

## 2025-03-01 NOTE — ASSESSMENT & PLAN NOTE
Monitor telemetry   Cardiology consult. Appreciate recs; signed off   Cardiac enzymes are negative (x 2 set).  Continue statin   Patient intolerant to nitroglycerin  OAARS report reviewed

## 2025-03-01 NOTE — ASSESSMENT & PLAN NOTE
Continue Wellbutrin 300 mg daily      Name: Vivian Carvalho MRN: [id number]     Date: 7/29/2020 Status: Scheduled     Time: 7:00 AM Length: 20     Visit Type: ACUTE VISIT SB2-4 [5468] Copay: $0.00     Provider: Haase, Susan Rachele, APRN CNP Department:  FAMILY PRACTICE     Encounter #: [id number] Notes: -lvm 07/17 needs to reschedule provider out of office. cc 6 mo f/u migraines     Left message patient needs to reschedule provider not in clinic     Annmarie Cho/

## 2025-03-01 NOTE — CARE PLAN
The patient's goals for the shift include      The clinical goals for the shift include adequate pain control    Over the shift, the patient did  make progress toward the following goals. Barriers to progression include medicate for pain. Recommendations to address these barriers include nsaids as well as narcotic.

## 2025-03-01 NOTE — NURSING NOTE
Stephen is sitting at side of bed urinal emptied  Requesting something to drink at this time  will reach out to md call light in reach

## 2025-03-01 NOTE — NURSING NOTE
BSSR complete Rich resting with eyes closed breathing even and unlabored no distress noted call light in reach

## 2025-03-01 NOTE — ASSESSMENT & PLAN NOTE
NSTEMI 6/2023 s/p PCI of OM1 and OM2    Most recent stress test without stress-induced ischemia  Continue medical management with aspirin, atorvastatin  Cardiology consulted

## 2025-03-02 VITALS
RESPIRATION RATE: 18 BRPM | HEIGHT: 75 IN | OXYGEN SATURATION: 97 % | SYSTOLIC BLOOD PRESSURE: 132 MMHG | TEMPERATURE: 99 F | DIASTOLIC BLOOD PRESSURE: 70 MMHG | BODY MASS INDEX: 39.17 KG/M2 | HEART RATE: 80 BPM | WEIGHT: 315 LBS

## 2025-03-02 PROCEDURE — 2500000001 HC RX 250 WO HCPCS SELF ADMINISTERED DRUGS (ALT 637 FOR MEDICARE OP): Performed by: INTERNAL MEDICINE

## 2025-03-02 PROCEDURE — 2500000005 HC RX 250 GENERAL PHARMACY W/O HCPCS: Performed by: NURSE PRACTITIONER

## 2025-03-02 PROCEDURE — 94640 AIRWAY INHALATION TREATMENT: CPT

## 2025-03-02 PROCEDURE — 2500000002 HC RX 250 W HCPCS SELF ADMINISTERED DRUGS (ALT 637 FOR MEDICARE OP, ALT 636 FOR OP/ED): Performed by: NURSE PRACTITIONER

## 2025-03-02 PROCEDURE — 99239 HOSP IP/OBS DSCHRG MGMT >30: CPT | Performed by: NURSE PRACTITIONER

## 2025-03-02 PROCEDURE — G0378 HOSPITAL OBSERVATION PER HR: HCPCS

## 2025-03-02 PROCEDURE — 2500000001 HC RX 250 WO HCPCS SELF ADMINISTERED DRUGS (ALT 637 FOR MEDICARE OP): Performed by: NURSE PRACTITIONER

## 2025-03-02 RX ORDER — OXYCODONE HYDROCHLORIDE 5 MG/1
5 TABLET ORAL EVERY 8 HOURS PRN
Qty: 9 TABLET | Refills: 0 | Status: SHIPPED | OUTPATIENT
Start: 2025-03-02 | End: 2025-03-05

## 2025-03-02 RX ADMIN — ASPIRIN 81 MG: 81 TABLET, COATED ORAL at 08:31

## 2025-03-02 RX ADMIN — BUPROPION HYDROCHLORIDE 150 MG: 150 TABLET, EXTENDED RELEASE ORAL at 08:32

## 2025-03-02 RX ADMIN — TORSEMIDE 50 MG: 100 TABLET ORAL at 08:32

## 2025-03-02 RX ADMIN — OXYCODONE HYDROCHLORIDE AND ACETAMINOPHEN 2 TABLET: 5; 325 TABLET ORAL at 08:31

## 2025-03-02 RX ADMIN — APIXABAN 5 MG: 5 TABLET, FILM COATED ORAL at 08:32

## 2025-03-02 RX ADMIN — ALBUTEROL SULFATE 2.5 MG: 2.5 SOLUTION RESPIRATORY (INHALATION) at 07:33

## 2025-03-02 RX ADMIN — SPIRONOLACTONE 50 MG: 50 TABLET ORAL at 08:32

## 2025-03-02 RX ADMIN — CARVEDILOL 3.12 MG: 3.12 TABLET, FILM COATED ORAL at 08:32

## 2025-03-02 RX ADMIN — OXYCODONE HYDROCHLORIDE AND ACETAMINOPHEN 2 TABLET: 5; 325 TABLET ORAL at 02:56

## 2025-03-02 RX ADMIN — ALLOPURINOL 300 MG: 300 TABLET ORAL at 08:31

## 2025-03-02 RX ADMIN — LOSARTAN POTASSIUM 50 MG: 50 TABLET, FILM COATED ORAL at 08:31

## 2025-03-02 RX ADMIN — Medication 4 L/MIN: at 07:34

## 2025-03-02 RX ADMIN — LEVETIRACETAM 750 MG: 500 TABLET, FILM COATED ORAL at 08:32

## 2025-03-02 SDOH — ECONOMIC STABILITY: INCOME INSECURITY: IN THE PAST 12 MONTHS HAS THE ELECTRIC, GAS, OIL, OR WATER COMPANY THREATENED TO SHUT OFF SERVICES IN YOUR HOME?: NO

## 2025-03-02 SDOH — SOCIAL STABILITY: SOCIAL INSECURITY: WITHIN THE LAST YEAR, HAVE YOU BEEN AFRAID OF YOUR PARTNER OR EX-PARTNER?: NO

## 2025-03-02 SDOH — HEALTH STABILITY: MENTAL HEALTH
DO YOU FEEL STRESS - TENSE, RESTLESS, NERVOUS, OR ANXIOUS, OR UNABLE TO SLEEP AT NIGHT BECAUSE YOUR MIND IS TROUBLED ALL THE TIME - THESE DAYS?: NOT AT ALL

## 2025-03-02 SDOH — ECONOMIC STABILITY: FOOD INSECURITY: WITHIN THE PAST 12 MONTHS, YOU WORRIED THAT YOUR FOOD WOULD RUN OUT BEFORE YOU GOT THE MONEY TO BUY MORE.: NEVER TRUE

## 2025-03-02 SDOH — SOCIAL STABILITY: SOCIAL NETWORK
DO YOU BELONG TO ANY CLUBS OR ORGANIZATIONS SUCH AS CHURCH GROUPS, UNIONS, FRATERNAL OR ATHLETIC GROUPS, OR SCHOOL GROUPS?: NO

## 2025-03-02 SDOH — SOCIAL STABILITY: SOCIAL INSECURITY: WITHIN THE LAST YEAR, HAVE YOU BEEN HUMILIATED OR EMOTIONALLY ABUSED IN OTHER WAYS BY YOUR PARTNER OR EX-PARTNER?: NO

## 2025-03-02 SDOH — HEALTH STABILITY: MENTAL HEALTH: HOW OFTEN DO YOU HAVE A DRINK CONTAINING ALCOHOL?: NEVER

## 2025-03-02 SDOH — SOCIAL STABILITY: SOCIAL INSECURITY
WITHIN THE LAST YEAR, HAVE YOU BEEN KICKED, HIT, SLAPPED, OR OTHERWISE PHYSICALLY HURT BY YOUR PARTNER OR EX-PARTNER?: NO

## 2025-03-02 SDOH — SOCIAL STABILITY: SOCIAL INSECURITY: ARE YOU MARRIED, WIDOWED, DIVORCED, SEPARATED, NEVER MARRIED, OR LIVING WITH A PARTNER?: MARRIED

## 2025-03-02 SDOH — ECONOMIC STABILITY: HOUSING INSECURITY: IN THE LAST 12 MONTHS, WAS THERE A TIME WHEN YOU WERE NOT ABLE TO PAY THE MORTGAGE OR RENT ON TIME?: NO

## 2025-03-02 SDOH — ECONOMIC STABILITY: TRANSPORTATION INSECURITY: IN THE PAST 12 MONTHS, HAS LACK OF TRANSPORTATION KEPT YOU FROM MEDICAL APPOINTMENTS OR FROM GETTING MEDICATIONS?: NO

## 2025-03-02 SDOH — ECONOMIC STABILITY: HOUSING INSECURITY: AT ANY TIME IN THE PAST 12 MONTHS, WERE YOU HOMELESS OR LIVING IN A SHELTER (INCLUDING NOW)?: NO

## 2025-03-02 SDOH — HEALTH STABILITY: MENTAL HEALTH: HOW MANY DRINKS CONTAINING ALCOHOL DO YOU HAVE ON A TYPICAL DAY WHEN YOU ARE DRINKING?: PATIENT DOES NOT DRINK

## 2025-03-02 SDOH — HEALTH STABILITY: PHYSICAL HEALTH
HOW OFTEN DO YOU NEED TO HAVE SOMEONE HELP YOU WHEN YOU READ INSTRUCTIONS, PAMPHLETS, OR OTHER WRITTEN MATERIAL FROM YOUR DOCTOR OR PHARMACY?: NEVER

## 2025-03-02 SDOH — ECONOMIC STABILITY: FOOD INSECURITY: WITHIN THE PAST 12 MONTHS, THE FOOD YOU BOUGHT JUST DIDN'T LAST AND YOU DIDN'T HAVE MONEY TO GET MORE.: NEVER TRUE

## 2025-03-02 SDOH — SOCIAL STABILITY: SOCIAL NETWORK: HOW OFTEN DO YOU ATTEND CHURCH OR RELIGIOUS SERVICES?: NEVER

## 2025-03-02 SDOH — HEALTH STABILITY: PHYSICAL HEALTH: ON AVERAGE, HOW MANY MINUTES DO YOU ENGAGE IN EXERCISE AT THIS LEVEL?: 0 MIN

## 2025-03-02 SDOH — ECONOMIC STABILITY: HOUSING INSECURITY: IN THE PAST 12 MONTHS, HOW MANY TIMES HAVE YOU MOVED WHERE YOU WERE LIVING?: 0

## 2025-03-02 SDOH — SOCIAL STABILITY: SOCIAL INSECURITY
WITHIN THE LAST YEAR, HAVE YOU BEEN RAPED OR FORCED TO HAVE ANY KIND OF SEXUAL ACTIVITY BY YOUR PARTNER OR EX-PARTNER?: NO

## 2025-03-02 SDOH — HEALTH STABILITY: MENTAL HEALTH: HOW OFTEN DO YOU HAVE SIX OR MORE DRINKS ON ONE OCCASION?: NEVER

## 2025-03-02 SDOH — ECONOMIC STABILITY: FOOD INSECURITY: HOW HARD IS IT FOR YOU TO PAY FOR THE VERY BASICS LIKE FOOD, HOUSING, MEDICAL CARE, AND HEATING?: NOT HARD AT ALL

## 2025-03-02 SDOH — SOCIAL STABILITY: SOCIAL NETWORK: HOW OFTEN DO YOU ATTEND MEETINGS OF THE CLUBS OR ORGANIZATIONS YOU BELONG TO?: NEVER

## 2025-03-02 SDOH — HEALTH STABILITY: PHYSICAL HEALTH: ON AVERAGE, HOW MANY DAYS PER WEEK DO YOU ENGAGE IN MODERATE TO STRENUOUS EXERCISE (LIKE A BRISK WALK)?: 0 DAYS

## 2025-03-02 SDOH — SOCIAL STABILITY: SOCIAL NETWORK
IN A TYPICAL WEEK, HOW MANY TIMES DO YOU TALK ON THE PHONE WITH FAMILY, FRIENDS, OR NEIGHBORS?: MORE THAN THREE TIMES A WEEK

## 2025-03-02 SDOH — SOCIAL STABILITY: SOCIAL NETWORK: HOW OFTEN DO YOU GET TOGETHER WITH FRIENDS OR RELATIVES?: TWICE A WEEK

## 2025-03-02 ASSESSMENT — PAIN - FUNCTIONAL ASSESSMENT
PAIN_FUNCTIONAL_ASSESSMENT: 0-10
PAIN_FUNCTIONAL_ASSESSMENT: 0-10

## 2025-03-02 ASSESSMENT — COGNITIVE AND FUNCTIONAL STATUS - GENERAL
DAILY ACTIVITIY SCORE: 24
MOBILITY SCORE: 24

## 2025-03-02 ASSESSMENT — PAIN DESCRIPTION - ORIENTATION: ORIENTATION: POSTERIOR

## 2025-03-02 ASSESSMENT — PAIN SCALES - GENERAL
PAINLEVEL_OUTOF10: 4
PAINLEVEL_OUTOF10: 8
PAINLEVEL_OUTOF10: 8

## 2025-03-02 ASSESSMENT — PAIN DESCRIPTION - DESCRIPTORS: DESCRIPTORS: ACHING

## 2025-03-02 ASSESSMENT — LIFESTYLE VARIABLES
SKIP TO QUESTIONS 9-10: 1
AUDIT-C TOTAL SCORE: 0

## 2025-03-02 ASSESSMENT — PAIN DESCRIPTION - LOCATION: LOCATION: NECK

## 2025-03-02 ASSESSMENT — ACTIVITIES OF DAILY LIVING (ADL): LACK_OF_TRANSPORTATION: NO

## 2025-03-02 NOTE — PROGRESS NOTES
03/02/25 1011   ACS Disability Status   Are you deaf or do you have serious difficulty hearing? N   Are you blind or do you have serious difficulty seeing, even when wearing glasses? N   Because of a physical, mental, or emotional condition, do you have serious difficulty concentrating, remembering, or making decisions? (5 years old or older) N   Do you have serious difficulty walking or climbing stairs? Y   Do you have serious difficulty dressing or bathing? N   Because of a physical, mental, or emotional condition, do you have serious difficulty doing errands alone such as visiting the doctor? N

## 2025-03-02 NOTE — CARE PLAN
Problem: Pain - Adult  Goal: Verbalizes/displays adequate comfort level or baseline comfort level  Outcome: Progressing     Problem: Safety - Adult  Goal: Free from fall injury  Outcome: Progressing     Problem: Discharge Planning  Goal: Discharge to home or other facility with appropriate resources  Outcome: Progressing   The patient's goals for the shift include      The clinical goals for the shift include no falls during shift

## 2025-03-02 NOTE — PROGRESS NOTES
,Colonoscopy   WHAT YOU NEED TO KNOW:   A colonoscopy is a procedure to examine the inside of your colon (intestine) with a scope  Polyps or tissue growths may have been removed during your colonoscopy  It is normal to feel bloated and to have some abdominal discomfort  You should be passing gas  If you have hemorrhoids or you had polyps removed, you may have a small amount of bleeding  DISCHARGE INSTRUCTIONS:   Seek care immediately if:   You have sudden, severe abdominal pain  You have problems swallowing  You have a large amount of black, sticky bowel movements or blood in your bowel movements  You have sudden trouble breathing  You feel weak, lightheaded, or faint or your heart beats faster than normal for you  Contact your healthcare provider if:   You have a fever and chills  You have nausea or are vomiting  Your abdomen is bloated or feels full and hard  You have abdominal pain  You have black, sticky bowel movements or blood in your bowel movements  You have not had a bowel movement for 3 days after your procedure  You have rash or hives  You have questions or concerns about your procedure  Activity:   Do not lift, strain, or run for 24 hours after your procedure  Rest after your procedure  You have been given medicine to relax you  Do not drive or make important decisions until the day after your procedure  Return to your normal activity as directed  Relieve gas and discomfort from bloating by lying on your right side with a heating pad on your abdomen  You may need to take short walks to help the gas move out  Eat small meals until bloating is relieved  Follow up with your healthcare provider as directed: Write down your questions so you remember to ask them during your visits  If you take a blood thinner, please review the specific instructions from your endoscopist about when you should resume it   These can be found in the Recommendation Kapil MICHAEL Child is a 70 y.o. male on day 0 of admission presenting with Chest pain, unspecified type.    Patient lives with his wife Monica in a multi-level house. He mostly uses a cane, sometimes a walker, uses a rollator when out of the house since it has a seat. House has a second level, patient able to manage the stairs. He is independent with ADLs. Spouse does the driving. He uses a BiPAP at , it is here with him. PCP is Yaa Juarez MD.  ADOD today  [facility] scores: not yet seen  Patient active with [facility]. RNCC sent secure chat to Dr Alonzo requesting external hhc order.   Plan is to dc home, resume current hhc services, spouse will transport.       Charissa Perez RN       and Your Medication list sections of this After Visit Summary

## 2025-03-02 NOTE — NURSING NOTE
AVS discussed with patient and wife at bedside. Wife brought patients home O2. All questions answered, patient and wife verbalized understanding.       03/02/25 at 2:28 PM - ADONAY DELGADO RN

## 2025-03-02 NOTE — DISCHARGE SUMMARY
Discharge Diagnosis  Chest pain, unspecified type    Issues Requiring Follow-Up      Discharge Meds     Medication List      START taking these medications     oxygen gas therapy; Commonly known as: O2; Inhale 1 each every 12 hours.     CHANGE how you take these medications     potassium chloride CR 10 mEq ER tablet; Commonly known as: Klor-Con;   What changed: Another medication with the same name was removed. Continue   taking this medication, and follow the directions you see here.     CONTINUE taking these medications     albuterol sulfate 90 mcg/actuation aero powdr breath act w/sensor   inhaler; Commonly known as: Proair Digihaler   allopurinol 300 mg tablet; Commonly known as: Zyloprim   aspirin 81 mg EC tablet   atorvastatin 40 mg tablet; Commonly known as: Lipitor   buPROPion  mg 24 hr tablet; Commonly known as: Wellbutrin XL   carvedilol 3.125 mg tablet; Commonly known as: Coreg   Eliquis 5 mg tablet; Generic drug: apixaban   fluticasone propion-salmeteroL 250-50 mcg/dose diskus inhaler; Commonly   known as: Advair Diskus   ipratropium-albuteroL 0.5-2.5 mg/3 mL nebulizer solution; Commonly known   as: Duo-Neb   Jardiance 10 mg; Generic drug: empagliflozin   levETIRAcetam 750 mg tablet; Commonly known as: Keppra   losartan 50 mg tablet; Commonly known as: Cozaar   polyethylene glycol 17 gram packet; Commonly known as: Glycolax, Miralax   spironolactone 50 mg tablet; Commonly known as: Aldactone   torsemide 10 mg tablet; Commonly known as: Demadex   traZODone 100 mg tablet; Commonly known as: Desyrel       Test Results Pending At Discharge  Pending Labs       No current pending labs.            Hospital Course   Presented to ER 2/28 with complaints of chest pain radiating to left arm and jaw. Evaluation revealed EKG with no acute ischemia noted and troponin WNLs. Patient states pain is worse with deep breath and it has been intermittent since hospitalization in January. Normally tolerable with Tylenol,  but seemed much worse this time and had no improvement with usual treatment. Cardiology consulted. No further evaluation at this time and follow up with cardiologist in 1 week. Cleared for discharge. Pain not resolved, so patient stayed overnight again. Feels better today and asking for discharge. Recommend follow up with pulmonology and PCP as well.     Pertinent Physical Exam At Time of Discharge  Physical Exam  Constitutional:       Appearance: Normal appearance.   HENT:      Head: Normocephalic and atraumatic.      Mouth/Throat:      Mouth: Mucous membranes are moist.      Pharynx: Oropharynx is clear.   Eyes:      Extraocular Movements: Extraocular movements intact.      Conjunctiva/sclera: Conjunctivae normal.      Pupils: Pupils are equal, round, and reactive to light.   Cardiovascular:      Rate and Rhythm: Normal rate and regular rhythm.      Pulses: Normal pulses.      Heart sounds: Normal heart sounds.   Pulmonary:      Effort: Pulmonary effort is normal.      Breath sounds: Normal breath sounds.   Abdominal:      General: Bowel sounds are normal.      Palpations: Abdomen is soft.      Tenderness: There is no abdominal tenderness.   Musculoskeletal:         General: Normal range of motion.      Cervical back: Normal range of motion and neck supple.   Skin:     General: Skin is warm and dry.      Capillary Refill: Capillary refill takes less than 2 seconds.   Neurological:      General: No focal deficit present.      Mental Status: He is alert and oriented to person, place, and time.   Psychiatric:         Mood and Affect: Mood normal.         Behavior: Behavior normal.         Outpatient Follow-Up  No future appointments.      Lorraine Felix, APRN-CNP

## 2025-03-02 NOTE — PROGRESS NOTES
03/02/25 1007   Physical Activity   On average, how many days per week do you engage in moderate to strenuous exercise (like a brisk walk)? 0 days   On average, how many minutes do you engage in exercise at this level? 0 min   Financial Resource Strain   How hard is it for you to pay for the very basics like food, housing, medical care, and heating? Not hard   Housing Stability   In the last 12 months, was there a time when you were not able to pay the mortgage or rent on time? N   In the past 12 months, how many times have you moved where you were living? 0   At any time in the past 12 months, were you homeless or living in a shelter (including now)? N   Transportation Needs   In the past 12 months, has lack of transportation kept you from medical appointments or from getting medications? no   In the past 12 months, has lack of transportation kept you from meetings, work, or from getting things needed for daily living? No   Food Insecurity   Within the past 12 months, you worried that your food would run out before you got the money to buy more. Never true   Within the past 12 months, the food you bought just didn't last and you didn't have money to get more. Never true   Stress   Do you feel stress - tense, restless, nervous, or anxious, or unable to sleep at night because your mind is troubled all the time - these days? Not at all   Social Connections   In a typical week, how many times do you talk on the phone with family, friends, or neighbors? More than 3   How often do you get together with friends or relatives? Twice   How often do you attend Latter-day or Mormonism services? Never   Do you belong to any clubs or organizations such as Latter-day groups, unions, fraternal or athletic groups, or school groups? No   How often do you attend meetings of the clubs or organizations you belong to? Never   Are you , , , , never , or living with a partner?    Intimate Partner  Violence   Within the last year, have you been afraid of your partner or ex-partner? No   Within the last year, have you been humiliated or emotionally abused in other ways by your partner or ex-partner? No   Within the last year, have you been kicked, hit, slapped, or otherwise physically hurt by your partner or ex-partner? No   Within the last year, have you been raped or forced to have any kind of sexual activity by your partner or ex-partner? No   Alcohol Use   Q1: How often do you have a drink containing alcohol? Never   Q2: How many drinks containing alcohol do you have on a typical day when you are drinking? None   Q3: How often do you have six or more drinks on one occasion? Never   Utilities   In the past 12 months has the electric, gas, oil, or water company threatened to shut off services in your home? No   Health Literacy   How often do you need to have someone help you when you read instructions, pamphlets, or other written material from your doctor or pharmacy? Never

## 2025-03-02 NOTE — PROGRESS NOTES
Physical Therapy                 Therapy Communication Note    Patient Name: Kapil Luna  MRN: [id number]  Department: [address]  Room: [address] [place], [place] [address]  Today's Date: 3/2/2025     Discipline: Physical Therapy    PT Missed Visit: Yes     Missed Visit Reason: Missed Visit Reason: Cancel    Missed Time: Cancel    Comment: PT consult received.  Chart reviewed.  PT evaluation attempted.  Pt. Appears groggy, reports recent pain medication.  Difficulty answering questions and staying on ask.  A & O x 2-3.  Will re-attempt as able.

## 2025-03-02 NOTE — PROGRESS NOTES
03/02/25 1009   Discharge Planning   Living Arrangements Spouse/significant other   Support Systems Spouse/significant other   Assistance Needed resume current [facility] services   Type of Residence Private residence   Number of Stairs to Enter Residence 3   Number of Stairs Within Residence 1  (flight to upper level)   Do you have animals or pets at home? No   Who is requesting discharge planning? Provider   Home or Post Acute Services In home services   Type of Home Care Services Home nursing visits;Home PT   Expected Discharge Disposition Home Health   Does the patient need discharge transport arranged? No   Patient Choice   Provider Choice list and CMS website (https://medicare.gov/care-compare#search) for post-acute Quality and Resource Measure Data were provided and reviewed with: Other (Comment)  (patient wants to keep current provider)   Patient / Family choosing to utilize agency / facility established prior to hospitalization Yes   Stroke Family Assessment   Stroke Family Assessment Needed No

## 2025-03-02 NOTE — CARE PLAN
Problem: Pain - Adult  Goal: Verbalizes/displays adequate comfort level or baseline comfort level  Outcome: Not Progressing  Note: Pain med requested q4hr  for9/10   The patient's goals for the shift include  no pain    The clinical goals for the shift include patient remain HDS throughout shift        Problem: Safety - Adult  Goal: Free from fall injury  Outcome: Progressing     Problem: Discharge Planning  Goal: Discharge to home or other facility with appropriate resources  Outcome: Progressing     Problem: Chronic Conditions and Co-morbidities  Goal: Patient's chronic conditions and co-morbidity symptoms are monitored and maintained or improved  Outcome: Progressing     Problem: Nutrition  Goal: Nutrient intake appropriate for maintaining nutritional needs  Outcome: Progressing     Problem: Respiratory  Goal: Minimize anxiety/maximize coping throughout shift  Outcome: Progressing  Goal: Minimal/no exertional discomfort or dyspnea this shift  Outcome: Progressing  Goal: No signs of respiratory distress (eg. Use of accessory muscles. Peds grunting)  Outcome: Progressing  Goal: Verbalize decreased shortness of breath this shift  Outcome: Progressing  Goal: Wean oxygen to maintain O2 saturation per order/standard this shift  Outcome: Progressing

## 2025-03-05 LAB
ATRIAL RATE: 69 BPM
P AXIS: 9 DEGREES
P OFFSET: 160 MS
P ONSET: 117 MS
PR INTERVAL: 200 MS
Q ONSET: 217 MS
QRS COUNT: 12 BEATS
QRS DURATION: 140 MS
QT INTERVAL: 470 MS
QTC CALCULATION(BAZETT): 503 MS
QTC FREDERICIA: 492 MS
R AXIS: -18 DEGREES
T AXIS: 108 DEGREES
T OFFSET: 452 MS
VENTRICULAR RATE: 69 BPM

## 2025-03-09 ENCOUNTER — HOSPITAL ENCOUNTER (EMERGENCY)
Facility: HOSPITAL | Age: 70
Discharge: HOME | End: 2025-03-10
Payer: COMMERCIAL

## 2025-03-09 VITALS
SYSTOLIC BLOOD PRESSURE: 128 MMHG | TEMPERATURE: 99.3 F | HEIGHT: 76 IN | HEART RATE: 72 BPM | DIASTOLIC BLOOD PRESSURE: 77 MMHG | BODY MASS INDEX: 38.36 KG/M2 | WEIGHT: 315 LBS | RESPIRATION RATE: 19 BRPM | OXYGEN SATURATION: 93 %

## 2025-03-09 DIAGNOSIS — R04.0 EPISTAXIS: Primary | ICD-10-CM

## 2025-03-09 PROCEDURE — 99282 EMERGENCY DEPT VISIT SF MDM: CPT | Mod: 25

## 2025-03-09 PROCEDURE — 99281 EMR DPT VST MAYX REQ PHY/QHP: CPT

## 2025-03-09 RX ORDER — SILVER NITRATE 38.21; 12.74 MG/1; MG/1
STICK TOPICAL ONCE
Status: COMPLETED | OUTPATIENT
Start: 2025-03-10 | End: 2025-03-10

## 2025-03-09 ASSESSMENT — COLUMBIA-SUICIDE SEVERITY RATING SCALE - C-SSRS
1. IN THE PAST MONTH, HAVE YOU WISHED YOU WERE DEAD OR WISHED YOU COULD GO TO SLEEP AND NOT WAKE UP?: NO
6. HAVE YOU EVER DONE ANYTHING, STARTED TO DO ANYTHING, OR PREPARED TO DO ANYTHING TO END YOUR LIFE?: NO
2. HAVE YOU ACTUALLY HAD ANY THOUGHTS OF KILLING YOURSELF?: NO

## 2025-03-09 ASSESSMENT — PAIN DESCRIPTION - LOCATION: LOCATION: FACE

## 2025-03-09 ASSESSMENT — PAIN - FUNCTIONAL ASSESSMENT: PAIN_FUNCTIONAL_ASSESSMENT: 0-10

## 2025-03-09 ASSESSMENT — PAIN SCALES - GENERAL: PAINLEVEL_OUTOF10: 8

## 2025-03-10 PROCEDURE — 30903 CONTROL OF NOSEBLEED: CPT | Mod: RT | Performed by: PHYSICIAN ASSISTANT

## 2025-03-10 PROCEDURE — 2500000005 HC RX 250 GENERAL PHARMACY W/O HCPCS: Performed by: PHYSICIAN ASSISTANT

## 2025-03-10 RX ADMIN — SILVER NITRATE APPLICATORS 1 APPLICATOR: 25; 75 STICK TOPICAL at 00:23

## 2025-03-10 NOTE — ED PROVIDER NOTES
HPI   Chief Complaint   Patient presents with    Epistaxis (Nose Bleed)     Patient started having nose bleed at 7pm and bleeding on and off since. Patient is on Eliquis .Clamp placed on nose at this time, but patient took off stating it hurts. Nose with small dribble of blood at this time        70-year-old male presented emergency department with a chief complaint of bleeding from his left nare.  He is on Eliquis.  He is on oxygen.  That nose is not actively hemorrhaging on arrival.  He denies lightheadedness dizziness numbness weakness.  Well-appearing nontoxic afebrile.  No other complaint.              Patient History   Past Medical History:   Diagnosis Date    DVT (deep venous thrombosis) (Multi)     Gout     History of pulmonary embolus (PE)     Hypertension     MI (myocardial infarction) (Multi)     Stroke (Multi)      Past Surgical History:   Procedure Laterality Date    MR HEAD ANGIO WO IV CONTRAST  7/7/2015    MR HEAD ANGIO WO IV CONTRAST [facility]    MR HEAD ANGIO WO IV CONTRAST  7/9/2015    MR HEAD ANGIO WO IV CONTRAST [facility]     No family history on file.  Social History     Tobacco Use    Smoking status: Former     Types: Cigarettes    Smokeless tobacco: Never   Substance Use Topics    Alcohol use: Not Currently    Drug use: Never       Physical Exam   ED Triage Vitals [03/09/25 2322]   Temperature Heart Rate Respirations BP   37.4 °C (99.3 °F) 72 19 128/77      Pulse Ox Temp Source Heart Rate Source Patient Position   (!) 93 % Temporal Monitor --      BP Location FiO2 (%)     -- --       Physical Exam  Vitals and nursing note reviewed.   Constitutional:       Appearance: Normal appearance.   HENT:      Head: Normocephalic.      Nose:      Comments: There is an excoriated region at the anterior portion of the nose that has very mild bleeding  Cardiovascular:      Rate and Rhythm: Normal rate and regular rhythm.      Pulses: Normal pulses.      Heart sounds: Normal heart  sounds.   Pulmonary:      Effort: Pulmonary effort is normal.      Breath sounds: Normal breath sounds.   Abdominal:      Palpations: Abdomen is soft.   Musculoskeletal:         General: Normal range of motion.      Cervical back: Normal range of motion.   Skin:     General: Skin is warm.   Neurological:      General: No focal deficit present.      Mental Status: He is oriented to person, place, and time.   Psychiatric:         Mood and Affect: Mood normal.         Behavior: Behavior normal.           ED Course & MDM   Diagnoses as of 03/10/25 0036   Epistaxis                 No data recorded     Gayathri Coma Scale Score: 15 (03/09/25 2325 : Price Patton, DARINEL)                           Medical Decision Making  I have seen and evaluated this patient.  Physician available for consultation.  Vital signs have been reviewed.  All laboratory and diagnostic imaging is reviewed by myself and interpreted by myself unless otherwise stated.  Additionally imaging is interpreted by radiologist.    Area of bleeding is cauterized with silver nitrate patient is observed for 30 minutes after procedure without recurrence of bleeding.  I did place Vaseline at his base of his nares and give a simple oxygen mask in lieu of his nasal cannula for home.    Labs Reviewed - No data to display  No orders to display  Medications  silver nitrate applicators applicator (1 applicator Topical Given 3/10/25 0023)  New Prescriptions  No medications on file            Procedure  Epistaxis Management    Performed by: Juan Jose Lange PA-C  Authorized by: Juan Jose Lange PA-C    Consent:     Consent obtained:  Verbal    Risks, benefits, and alternatives were discussed: yes      Risks discussed:  Bleeding    Alternatives discussed:  No treatment  Universal protocol:     Procedure explained and questions answered to patient or proxy's satisfaction: yes      Patient identity confirmed:  Verbally with patient  Anesthesia:     Anesthesia method:   None  Procedure details:     Treatment site:  R anterior    Treatment method:  Silver nitrate    Treatment complexity:  Extensive    Treatment episode: initial    Post-procedure details:     Assessment:  Bleeding stopped    Procedure completion:  Tolerated       Juan Jose Lange PA-C  03/10/25 0121

## 2025-05-05 ENCOUNTER — APPOINTMENT (OUTPATIENT)
Dept: RADIOLOGY | Facility: HOSPITAL | Age: 70
End: 2025-05-05
Payer: COMMERCIAL

## 2025-05-05 ENCOUNTER — APPOINTMENT (OUTPATIENT)
Dept: CARDIOLOGY | Facility: HOSPITAL | Age: 70
End: 2025-05-05
Payer: COMMERCIAL

## 2025-05-05 ENCOUNTER — HOSPITAL ENCOUNTER (EMERGENCY)
Facility: HOSPITAL | Age: 70
Discharge: HOME | End: 2025-05-05
Attending: STUDENT IN AN ORGANIZED HEALTH CARE EDUCATION/TRAINING PROGRAM
Payer: COMMERCIAL

## 2025-05-05 VITALS
DIASTOLIC BLOOD PRESSURE: 104 MMHG | BODY MASS INDEX: 38.36 KG/M2 | HEIGHT: 76 IN | HEART RATE: 63 BPM | SYSTOLIC BLOOD PRESSURE: 169 MMHG | RESPIRATION RATE: 18 BRPM | OXYGEN SATURATION: 97 % | WEIGHT: 315 LBS | TEMPERATURE: 98.4 F

## 2025-05-05 DIAGNOSIS — I50.9 MILD CONGESTIVE HEART FAILURE: ICD-10-CM

## 2025-05-05 DIAGNOSIS — J98.4 PNEUMONITIS: ICD-10-CM

## 2025-05-05 DIAGNOSIS — R07.9 CHEST PAIN, UNSPECIFIED TYPE: Primary | ICD-10-CM

## 2025-05-05 LAB
ALBUMIN SERPL BCP-MCNC: 3.7 G/DL (ref 3.4–5)
ALP SERPL-CCNC: 76 U/L (ref 33–136)
ALT SERPL W P-5'-P-CCNC: 9 U/L (ref 10–52)
ANION GAP SERPL CALCULATED.3IONS-SCNC: 10 MMOL/L (ref 10–20)
AST SERPL W P-5'-P-CCNC: 14 U/L (ref 9–39)
BASOPHILS # BLD AUTO: 0.02 X10*3/UL (ref 0–0.1)
BASOPHILS NFR BLD AUTO: 0.3 %
BILIRUB SERPL-MCNC: 0.5 MG/DL (ref 0–1.2)
BNP SERPL-MCNC: 192 PG/ML (ref 0–99)
BUN SERPL-MCNC: 38 MG/DL (ref 6–23)
CALCIUM SERPL-MCNC: 9.1 MG/DL (ref 8.6–10.3)
CARDIAC TROPONIN I PNL SERPL HS: 12 NG/L (ref 0–20)
CARDIAC TROPONIN I PNL SERPL HS: 13 NG/L (ref 0–20)
CHLORIDE SERPL-SCNC: 102 MMOL/L (ref 98–107)
CO2 SERPL-SCNC: 34 MMOL/L (ref 21–32)
CREAT SERPL-MCNC: 1.59 MG/DL (ref 0.5–1.3)
EGFRCR SERPLBLD CKD-EPI 2021: 46 ML/MIN/1.73M*2
EOSINOPHIL # BLD AUTO: 0.14 X10*3/UL (ref 0–0.4)
EOSINOPHIL NFR BLD AUTO: 2.3 %
ERYTHROCYTE [DISTWIDTH] IN BLOOD BY AUTOMATED COUNT: 18.4 % (ref 11.5–14.5)
FLUAV RNA RESP QL NAA+PROBE: NOT DETECTED
FLUBV RNA RESP QL NAA+PROBE: NOT DETECTED
GLUCOSE SERPL-MCNC: 129 MG/DL (ref 74–99)
HCT VFR BLD AUTO: 42.5 % (ref 41–52)
HGB BLD-MCNC: 12.9 G/DL (ref 13.5–17.5)
IMM GRANULOCYTES # BLD AUTO: 0.02 X10*3/UL (ref 0–0.5)
IMM GRANULOCYTES NFR BLD AUTO: 0.3 % (ref 0–0.9)
LYMPHOCYTES # BLD AUTO: 0.54 X10*3/UL (ref 0.8–3)
LYMPHOCYTES NFR BLD AUTO: 9 %
MCH RBC QN AUTO: 25.7 PG (ref 26–34)
MCHC RBC AUTO-ENTMCNC: 30.4 G/DL (ref 32–36)
MCV RBC AUTO: 85 FL (ref 80–100)
MONOCYTES # BLD AUTO: 0.42 X10*3/UL (ref 0.05–0.8)
MONOCYTES NFR BLD AUTO: 7 %
NEUTROPHILS # BLD AUTO: 4.86 X10*3/UL (ref 1.6–5.5)
NEUTROPHILS NFR BLD AUTO: 81.1 %
NRBC BLD-RTO: 0 /100 WBCS (ref 0–0)
PLATELET # BLD AUTO: 197 X10*3/UL (ref 150–450)
POTASSIUM SERPL-SCNC: 4 MMOL/L (ref 3.5–5.3)
PROT SERPL-MCNC: 6 G/DL (ref 6.4–8.2)
RBC # BLD AUTO: 5.01 X10*6/UL (ref 4.5–5.9)
RSV RNA RESP QL NAA+PROBE: NOT DETECTED
SARS-COV-2 RNA RESP QL NAA+PROBE: NOT DETECTED
SODIUM SERPL-SCNC: 142 MMOL/L (ref 136–145)
WBC # BLD AUTO: 6 X10*3/UL (ref 4.4–11.3)

## 2025-05-05 PROCEDURE — 96374 THER/PROPH/DIAG INJ IV PUSH: CPT

## 2025-05-05 PROCEDURE — 36415 COLL VENOUS BLD VENIPUNCTURE: CPT | Performed by: STUDENT IN AN ORGANIZED HEALTH CARE EDUCATION/TRAINING PROGRAM

## 2025-05-05 PROCEDURE — 85025 COMPLETE CBC W/AUTO DIFF WBC: CPT | Performed by: STUDENT IN AN ORGANIZED HEALTH CARE EDUCATION/TRAINING PROGRAM

## 2025-05-05 PROCEDURE — 2500000001 HC RX 250 WO HCPCS SELF ADMINISTERED DRUGS (ALT 637 FOR MEDICARE OP): Performed by: STUDENT IN AN ORGANIZED HEALTH CARE EDUCATION/TRAINING PROGRAM

## 2025-05-05 PROCEDURE — 99285 EMERGENCY DEPT VISIT HI MDM: CPT | Mod: 25 | Performed by: STUDENT IN AN ORGANIZED HEALTH CARE EDUCATION/TRAINING PROGRAM

## 2025-05-05 PROCEDURE — 71046 X-RAY EXAM CHEST 2 VIEWS: CPT

## 2025-05-05 PROCEDURE — 84484 ASSAY OF TROPONIN QUANT: CPT | Performed by: STUDENT IN AN ORGANIZED HEALTH CARE EDUCATION/TRAINING PROGRAM

## 2025-05-05 PROCEDURE — 93005 ELECTROCARDIOGRAM TRACING: CPT

## 2025-05-05 PROCEDURE — 80053 COMPREHEN METABOLIC PANEL: CPT | Performed by: STUDENT IN AN ORGANIZED HEALTH CARE EDUCATION/TRAINING PROGRAM

## 2025-05-05 PROCEDURE — 96375 TX/PRO/DX INJ NEW DRUG ADDON: CPT

## 2025-05-05 PROCEDURE — 2500000004 HC RX 250 GENERAL PHARMACY W/ HCPCS (ALT 636 FOR OP/ED): Mod: JZ | Performed by: STUDENT IN AN ORGANIZED HEALTH CARE EDUCATION/TRAINING PROGRAM

## 2025-05-05 PROCEDURE — 87637 SARSCOV2&INF A&B&RSV AMP PRB: CPT | Performed by: STUDENT IN AN ORGANIZED HEALTH CARE EDUCATION/TRAINING PROGRAM

## 2025-05-05 PROCEDURE — 71046 X-RAY EXAM CHEST 2 VIEWS: CPT | Performed by: RADIOLOGY

## 2025-05-05 PROCEDURE — 83880 ASSAY OF NATRIURETIC PEPTIDE: CPT | Performed by: STUDENT IN AN ORGANIZED HEALTH CARE EDUCATION/TRAINING PROGRAM

## 2025-05-05 RX ORDER — OXYCODONE AND ACETAMINOPHEN 5; 325 MG/1; MG/1
2 TABLET ORAL ONCE
Refills: 0 | Status: COMPLETED | OUTPATIENT
Start: 2025-05-05 | End: 2025-05-05

## 2025-05-05 RX ORDER — AZITHROMYCIN 250 MG/1
250 TABLET, FILM COATED ORAL DAILY
Qty: 6 TABLET | Refills: 0 | Status: SHIPPED | OUTPATIENT
Start: 2025-05-05 | End: 2025-05-10

## 2025-05-05 RX ORDER — FUROSEMIDE 10 MG/ML
60 INJECTION INTRAMUSCULAR; INTRAVENOUS ONCE
Status: COMPLETED | OUTPATIENT
Start: 2025-05-05 | End: 2025-05-05

## 2025-05-05 RX ORDER — FENTANYL CITRATE 50 UG/ML
100 INJECTION, SOLUTION INTRAMUSCULAR; INTRAVENOUS ONCE
Status: COMPLETED | OUTPATIENT
Start: 2025-05-05 | End: 2025-05-05

## 2025-05-05 RX ORDER — IPRATROPIUM BROMIDE AND ALBUTEROL SULFATE 2.5; .5 MG/3ML; MG/3ML
3 SOLUTION RESPIRATORY (INHALATION) ONCE
Status: DISCONTINUED | OUTPATIENT
Start: 2025-05-05 | End: 2025-05-05 | Stop reason: HOSPADM

## 2025-05-05 RX ORDER — PREDNISONE 50 MG/1
50 TABLET ORAL DAILY
Qty: 7 TABLET | Refills: 0 | Status: SHIPPED | OUTPATIENT
Start: 2025-05-05 | End: 2025-05-12

## 2025-05-05 RX ORDER — ONDANSETRON HYDROCHLORIDE 2 MG/ML
4 INJECTION, SOLUTION INTRAVENOUS ONCE
Status: COMPLETED | OUTPATIENT
Start: 2025-05-05 | End: 2025-05-05

## 2025-05-05 RX ORDER — MORPHINE SULFATE 4 MG/ML
4 INJECTION, SOLUTION INTRAMUSCULAR; INTRAVENOUS ONCE
Status: COMPLETED | OUTPATIENT
Start: 2025-05-05 | End: 2025-05-05

## 2025-05-05 RX ORDER — OXYCODONE AND ACETAMINOPHEN 10; 325 MG/1; MG/1
1 TABLET ORAL EVERY 6 HOURS PRN
Qty: 5 TABLET | Refills: 0 | Status: SHIPPED | OUTPATIENT
Start: 2025-05-05 | End: 2025-05-08

## 2025-05-05 RX ADMIN — MORPHINE SULFATE 4 MG: 4 INJECTION, SOLUTION INTRAMUSCULAR; INTRAVENOUS at 16:19

## 2025-05-05 RX ADMIN — OXYCODONE AND ACETAMINOPHEN 2 TABLET: 5; 325 TABLET ORAL at 18:31

## 2025-05-05 RX ADMIN — METHYLPREDNISOLONE SODIUM SUCCINATE 125 MG: 125 INJECTION, POWDER, FOR SOLUTION INTRAMUSCULAR; INTRAVENOUS at 16:10

## 2025-05-05 RX ADMIN — ONDANSETRON HYDROCHLORIDE 4 MG: 2 INJECTION INTRAMUSCULAR; INTRAVENOUS at 16:20

## 2025-05-05 RX ADMIN — FUROSEMIDE 60 MG: 10 INJECTION, SOLUTION INTRAVENOUS at 16:09

## 2025-05-05 RX ADMIN — FENTANYL CITRATE 100 MCG: 50 INJECTION INTRAMUSCULAR; INTRAVENOUS at 15:07

## 2025-05-05 ASSESSMENT — PAIN SCALES - GENERAL: PAINLEVEL_OUTOF10: 7

## 2025-05-05 ASSESSMENT — COLUMBIA-SUICIDE SEVERITY RATING SCALE - C-SSRS
2. HAVE YOU ACTUALLY HAD ANY THOUGHTS OF KILLING YOURSELF?: NO
1. IN THE PAST MONTH, HAVE YOU WISHED YOU WERE DEAD OR WISHED YOU COULD GO TO SLEEP AND NOT WAKE UP?: NO
6. HAVE YOU EVER DONE ANYTHING, STARTED TO DO ANYTHING, OR PREPARED TO DO ANYTHING TO END YOUR LIFE?: NO

## 2025-05-05 ASSESSMENT — PAIN DESCRIPTION - ORIENTATION: ORIENTATION: MID

## 2025-05-05 ASSESSMENT — PAIN DESCRIPTION - DESCRIPTORS
DESCRIPTORS: BURNING
DESCRIPTORS: BURNING

## 2025-05-05 ASSESSMENT — PAIN DESCRIPTION - ONSET: ONSET: ONGOING

## 2025-05-05 ASSESSMENT — PAIN - FUNCTIONAL ASSESSMENT: PAIN_FUNCTIONAL_ASSESSMENT: 0-10

## 2025-05-05 ASSESSMENT — PAIN DESCRIPTION - FREQUENCY: FREQUENCY: CONSTANT/CONTINUOUS

## 2025-05-05 ASSESSMENT — PAIN DESCRIPTION - LOCATION: LOCATION: CHEST

## 2025-05-05 NOTE — DISCHARGE INSTRUCTIONS
Follow up with your primary care physician in the next 24-48 hours to schedule a follow up appointment. Return to the ED for any new or concerning symptoms including but not limited to chest pain with associated shortness of breath, sweating, chest pain that radiates down the arm or into the back or the neck, chest pain that gets worse with exertion and better with rest, or severe worsening of your symptoms.     In the meantime, we will treat pneumonitis with azithromycin which is an antibiotic as well as prednisone which is a steroid to help reduce the swelling and inflammation in your chest.  I would expect significant improvement after 2 days or so on this medication.  If you are having worsening symptoms including worsening shortness of breath and worsening chest pain despite treatment, I would return to the hospital for repeat evaluation and potentially hospitalization for symptom management and further care.  Otherwise follow-up using the referrals from your hospital discharge and follow-up with your primary care physician for further assessment outside of the hospital.

## 2025-05-05 NOTE — ED PROVIDER NOTES
HPI   Chief Complaint   Patient presents with    Chest Pain       This is a 70-year-old male with a past medical history of COPD on oxygen 4 L by nasal cannula at baseline, CAD, CHF, paroxysmal A-fib, CKD, DVT on Eliquis presenting to the ED for evaluation of chest pain and shortness of breath, productive cough.  He states he has been feeling well for the past 3 days or so with fatigue, cough, some chest discomfort.  He states his symptoms got much worse today, he states he is coughing up yellow sputum out of his lungs which is atypical for him.  He denies any worsening leg swelling, he states that he does have pain that he describes as pain in his lungs when he breathes in.  He does not feel that this is similar to previous cardiac chest pain that he is felt, he has had 2 MIs in the past, he feels that this discomfort is coming from his lungs specifically.  He does endorse some shortness of breath even while at rest which is why he ended up calling the ambulance to bring him to the hospital today for further evaluation.  Notably, he was admitted to [facility] in February for chest pain for observation, he was cleared for discharge by cardiology at that time.      History provided by:  Patient   used: No            Patient History   Medical History[1]  Surgical History[2]  Family History[3]  Social History[4]    Physical Exam   ED Triage Vitals   Temperature Heart Rate Resp BP   05/05/25 1423 05/05/25 1423 -- 05/05/25 1428   36.9 °C (98.4 °F) 73  122/63      Pulse Ox Temp Source Heart Rate Source Patient Position   05/05/25 1423 05/05/25 1423 05/05/25 1423 05/05/25 1423   98 % Oral Monitor Lying      BP Location FiO2 (%)     05/05/25 1423 --     Left arm        Physical Exam  General: well developed, obese 70-year-old male who is awake and alert, in no apparent distress.  Ventimask in place on arrival placed by EMS.  Eyes: sclera clear bilaterally, PERRL, EOMI  HENT: normocephalic,  atraumatic. Pharynx without erythema or exudates, uvula midline.  CV: regular rate and rhythm, no murmur, no gallops, or rubs. radial and dorsalis pedis pulses +2/4 bilaterally  Resp: Diminished in lower lung fields with some crackles in lower lung fields.  No accessory muscle use, no tachypnea, no conversational dyspnea.  GI: abdomen soft, nontender without rigidity or guarding, no peritoneal signs, abdomen is nondistended, no masses palpated  MSK: strength +5/5 to upper and lower extremities bilaterally, no swelling of the extremities.  Psych: appropriate mood and affect, cooperative with exam  Skin: warm, dry, without evidence of rash or abrasions    ED Course & MDM   ED Course as of 05/05/25 1703   Mon May 05, 2025   1427 EKG on my interpretation shows sinus rhythm with first-degree block.  PVCs present.  QTc is 527 ms, VA interval prolonged at 230.  T wave inversions in 1 and aVL, similar compared to prior EKG from February of this year.  PVC is now present.  There are some patient movement artifact, otherwise no acute ischemic pattern in the underlying rhythm.  No STEMI. [NT]      ED Course User Index  [NT] Gary Ron DO         Diagnoses as of 05/05/25 1703   Chest pain, unspecified type   Pneumonitis   Mild congestive heart failure                 No data recorded     [place] Coma Scale Score: 15 (05/05/25 1425 : Felecia Gandhi RN)                           Medical Decision Making  The patient is awake and alert, in no acute distress in the ED.  Vital signs are normal, he was dyspneic after walking at home, EMS placed him on a Ventimask at 10 L/min of oxygen and he was brought to the ED.  He does have some crackles in his lower lung fields on exam, productive cough and chills, fatigue suggestive of possible pneumonia.  Cardiac workup also ordered to assess for evidence of heart failure, pulmonary edema, ACS which could be causing his chest discomfort and shortness of breath.  He is afebrile, vital  "signs are normal, I am not concerned for sepsis on patient arrival.    I reviewed documentation with the patient's most recent hospital stay at [facility] just 5 days ago for similar presentation:    \"Pleuritic chest pain  -Started on diuretics: Lasix 40 mg IV twice daily. No improvement in chest pain  -Inflammatory markers normal (CRP, ESR pending)  - For pleuritic chest pain, received fentanyl and hydromorphone in the emergency room. No IV narcotics should be given.  - Cardiology consulted. Does not think this is cardiac in etiology. Echocardiogram to rule out pericardial etiology was discontinued per cards due to low inflammatory markers  - CT chest PE protocol showed no blood clot, acute lung abnormality. Stable mild aneurysmal dilation of ascending aorta to prox aortic arch  - patient requiring narcotic in the hospital, no clear etiology. Discussed need to dc narcotic. Given chronic pain issues (feet, chest, shoulder, back) referral to chronic pain clinic.    Congestive heart failure  -Patient admits to dietary indiscretion over the weekend. Attempted extra dose of torsemide but this is not improved  -Given Lasix x 2 doses. Improved thereafter  - Discussed need for heart failure chronic care clinic follow-up on discharge.  Troponins elevated but doubt ACS.  -resume home regimen of torsemide, spironolactone,c arvedilol, losartan    3. Elevated creatinine  - In setting of diuresis.  - improved on discharge  \"    On reassessment, the patient is still complaining of significant pain.  His cardiac workup is completely unremarkable, BNP mildly elevated.  He was given a dose of IV Lasix.  Treated with IV morphine after fentanyl was not sufficient for pain control.  After review of his most recent hospitalization, he was asking for numerous doses of narcotic pain medicine while he was previously hospitalized for chest pain.  This was not felt to be cardiac in nature, I reviewed CT angiogram of the chest " showing a stable aortic aneurysm, otherwise no evidence of acute pathology.  He did have some pulmonary edema and was referred to the pain clinic and the CHF clinic on discharge from [facility].  Given his benign workup today, I do not feel any further testing is necessary.  Presentation today is very similar to when he went to [place].  He is not hypoxic, he is to continue with diuretics as an outpatient, and continue following up as planned from his most recent hospitalization.  I do not feel that the risks of hospitalization outweigh the benefits at this time given lack of a clear cause of his pain but no evidence of any acute life-threatening pathology.    He was prescribed pain medication in addition to azithromycin, prednisone for treatment of his pneumonitis and is to follow-up as an outpatient per the recommendations from his most recent hospitalization.  If he has worsening symptoms despite treatment as an outpatient, he is to return to the hospital for reassessment and potentially hospital admission for management of his symptoms.    XR chest 2 views   Final Result   Findings suggestive of a degree of pulmonary edema/CHF. A component   of concomitant pneumonitis would be difficult to entirely exclude.        MACRO:   None        Signed by: Kapil Caruso 5/5/2025 3:42 PM   Dictation workstation:   [id number]        Labs Reviewed   CBC WITH AUTO DIFFERENTIAL - Abnormal       Result Value    WBC 6.0      nRBC 0.0      RBC 5.01      Hemoglobin 12.9 (*)     Hematocrit 42.5      MCV 85      MCH 25.7 (*)     MCHC 30.4 (*)     RDW 18.4 (*)     Platelets 197      Neutrophils % 81.1      Immature Granulocytes %, Automated 0.3      Lymphocytes % 9.0      Monocytes % 7.0      Eosinophils % 2.3      Basophils % 0.3      Neutrophils Absolute 4.86      Immature Granulocytes Absolute, Automated 0.02      Lymphocytes Absolute 0.54 (*)     Monocytes Absolute 0.42      Eosinophils Absolute 0.14      Basophils  Absolute 0.02     COMPREHENSIVE METABOLIC PANEL - Abnormal    Glucose 129 (*)     Sodium 142      Potassium 4.0      Chloride 102      Bicarbonate 34 (*)     Anion Gap 10      Urea Nitrogen 38 (*)     Creatinine 1.59 (*)     eGFR 46 (*)     Calcium 9.1      Albumin 3.7      Alkaline Phosphatase 76      Total Protein 6.0 (*)     AST 14      Bilirubin, Total 0.5      ALT 9 (*)    B-TYPE NATRIURETIC PEPTIDE - Abnormal     (*)     Narrative:        <100 pg/mL - Heart failure unlikely  100-299 pg/mL - Intermediate probability of acute heart                  failure exacerbation. Correlate with clinical                  context and patient history.    >=300 pg/mL - Heart Failure likely. Correlate with clinical                  context and patient history.    BNP testing is performed using different testing methodology at [facility] than at other [facility]. Direct result comparisons should only be made within the same method.      SARS-COV-2, INFLUENZA A/B AND RSV PCR - Normal    Coronavirus 2019, PCR Not Detected      Flu A Result Not Detected      Flu B Result Not Detected      RSV PCR Not Detected      Narrative:     This assay is an FDA-cleared, in vitro diagnostic nucleic acid amplification test for the qualitative detection and differentiation of SARS CoV-2/ Influenza A/B/ RSV from nasopharyngeal specimens collected from individuals with signs and symptoms of respiratory tract infections, and has been validated for use at [facility]. Negative results do not preclude COVID-19/ Influenza A/B/ RSV infections and should not be used as the sole basis for diagnosis, treatment, or other management decisions. Testing for SARS CoV-2 is recommended only for patients who meet current clinical and/or epidemiological criteria defined by federal, state, or local public health directives.   SERIAL TROPONIN-INITIAL - Normal    Troponin I, High Sensitivity 13      Narrative:      Less than 99th percentile of normal range cutoff-  Female and children under 18 years old <14 ng/L; Male <21 ng/L: Negative  Repeat testing should be performed if clinically indicated.     Female and children under 18 years old 14-50 ng/L; Male 21-50 ng/L:  Consistent with possible cardiac damage and possible increased clinical   risk. Serial measurements may help to assess extent of myocardial damage.     >50 ng/L: Consistent with cardiac damage, increased clinical risk and  myocardial infarction. Serial measurements may help assess extent of   myocardial damage.      NOTE: Children less than 1 year old may have higher baseline troponin   levels and results should be interpreted in conjunction with the overall   clinical context.     NOTE: Troponin I testing is performed using a different   testing methodology at [facility] than at other   [facility]. Direct result comparisons should only   be made within the same method.   SERIAL TROPONIN, 1 HOUR - Normal    Troponin I, High Sensitivity 12      Narrative:     Less than 99th percentile of normal range cutoff-  Female and children under 18 years old <14 ng/L; Male <21 ng/L: Negative  Repeat testing should be performed if clinically indicated.     Female and children under 18 years old 14-50 ng/L; Male 21-50 ng/L:  Consistent with possible cardiac damage and possible increased clinical   risk. Serial measurements may help to assess extent of myocardial damage.     >50 ng/L: Consistent with cardiac damage, increased clinical risk and  myocardial infarction. Serial measurements may help assess extent of   myocardial damage.      NOTE: Children less than 1 year old may have higher baseline troponin   levels and results should be interpreted in conjunction with the overall   clinical context.     NOTE: Troponin I testing is performed using a different   testing methodology at [facility] than at other   [facility]. Direct result  comparisons should only   be made within the same method.   TROPONIN SERIES- (INITIAL, 1 HR)    Narrative:     The following orders were created for panel order Troponin Series, (0, 1 HR).  Procedure                               Abnormality         Status                     ---------                               -----------         ------                     Troponin I, High Sensiti...[[id number]]  Normal              Final result               Troponin, High Sensitivi...[[id number]]  Normal              Final result                 Please view results for these tests on the individual orders.       Procedure  Procedures       [1]   Past Medical History:  Diagnosis Date    DVT (deep venous thrombosis) (Multi)     Gout     History of pulmonary embolus (PE)     Hypertension     MI (myocardial infarction) (Multi)     Stroke (Multi)    [2]   Past Surgical History:  Procedure Laterality Date    MR HEAD ANGIO WO IV CONTRAST  7/7/2015    MR HEAD ANGIO WO IV CONTRAST [facility]    MR HEAD ANGIO WO IV CONTRAST  7/9/2015    MR HEAD ANGIO WO IV CONTRAST [facility]   [3] No family history on file.  [4]   Social History  Tobacco Use    Smoking status: Former     Types: Cigarettes    Smokeless tobacco: Never   Substance Use Topics    Alcohol use: Not Currently    Drug use: Never        Gary Ron DO  05/06/25 1137

## 2025-05-05 NOTE — ED TRIAGE NOTES
BIBA for c/o burning midsternal CP since 0800. Radiates to back and neck. 324 ASA given in squad refused NTG. Also reports productive cough with tan sputum

## 2025-05-06 LAB
ATRIAL RATE: 75 BPM
P AXIS: 79 DEGREES
P OFFSET: 148 MS
P ONSET: 84 MS
PR INTERVAL: 230 MS
Q ONSET: 199 MS
QRS COUNT: 12 BEATS
QRS DURATION: 148 MS
QT INTERVAL: 472 MS
QTC CALCULATION(BAZETT): 527 MS
QTC FREDERICIA: 508 MS
R AXIS: -37 DEGREES
T AXIS: 98 DEGREES
T OFFSET: 435 MS
VENTRICULAR RATE: 75 BPM

## 2025-05-06 ASSESSMENT — HEART SCORE
AGE: 65+
HEART SCORE: 5
ECG: NON-SPECIFIC REPOLARIZATION DISTURBANCE
TROPONIN: LESS THAN OR EQUAL TO NORMAL LIMIT
RISK FACTORS: >2 RISK FACTORS OR HX OF ATHEROSCLEROTIC DISEASE
HISTORY: SLIGHTLY SUSPICIOUS

## 2025-05-23 ENCOUNTER — APPOINTMENT (OUTPATIENT)
Dept: CARDIOLOGY | Facility: HOSPITAL | Age: 70
End: 2025-05-23
Payer: COMMERCIAL

## 2025-05-23 ENCOUNTER — APPOINTMENT (OUTPATIENT)
Dept: RADIOLOGY | Facility: HOSPITAL | Age: 70
End: 2025-05-23
Payer: COMMERCIAL

## 2025-05-23 ENCOUNTER — HOSPITAL ENCOUNTER (EMERGENCY)
Facility: HOSPITAL | Age: 70
Discharge: HOME | End: 2025-05-23
Attending: EMERGENCY MEDICINE
Payer: COMMERCIAL

## 2025-05-23 VITALS
DIASTOLIC BLOOD PRESSURE: 68 MMHG | WEIGHT: 315 LBS | RESPIRATION RATE: 18 BRPM | BODY MASS INDEX: 38.36 KG/M2 | TEMPERATURE: 97.9 F | HEIGHT: 76 IN | SYSTOLIC BLOOD PRESSURE: 142 MMHG | HEART RATE: 70 BPM | OXYGEN SATURATION: 95 %

## 2025-05-23 DIAGNOSIS — R06.02 SHORTNESS OF BREATH: Primary | ICD-10-CM

## 2025-05-23 DIAGNOSIS — I50.32 CHRONIC HEART FAILURE WITH PRESERVED EJECTION FRACTION: ICD-10-CM

## 2025-05-23 DIAGNOSIS — R07.9 CHEST PAIN, UNSPECIFIED TYPE: ICD-10-CM

## 2025-05-23 LAB
ALBUMIN SERPL BCP-MCNC: 3.7 G/DL (ref 3.4–5)
ALP SERPL-CCNC: 68 U/L (ref 33–136)
ALT SERPL W P-5'-P-CCNC: 20 U/L (ref 10–52)
ANION GAP SERPL CALCULATED.3IONS-SCNC: 10 MMOL/L (ref 10–20)
AST SERPL W P-5'-P-CCNC: 17 U/L (ref 9–39)
BASOPHILS # BLD AUTO: 0.02 X10*3/UL (ref 0–0.1)
BASOPHILS NFR BLD AUTO: 0.3 %
BILIRUB SERPL-MCNC: 0.4 MG/DL (ref 0–1.2)
BNP SERPL-MCNC: 163 PG/ML (ref 0–99)
BUN SERPL-MCNC: 46 MG/DL (ref 6–23)
CALCIUM SERPL-MCNC: 8.5 MG/DL (ref 8.6–10.3)
CARDIAC TROPONIN I PNL SERPL HS: 16 NG/L (ref 0–20)
CARDIAC TROPONIN I PNL SERPL HS: 17 NG/L (ref 0–20)
CHLORIDE SERPL-SCNC: 103 MMOL/L (ref 98–107)
CO2 SERPL-SCNC: 29 MMOL/L (ref 21–32)
CREAT SERPL-MCNC: 1.46 MG/DL (ref 0.5–1.3)
EGFRCR SERPLBLD CKD-EPI 2021: 51 ML/MIN/1.73M*2
EOSINOPHIL # BLD AUTO: 0 X10*3/UL (ref 0–0.4)
EOSINOPHIL NFR BLD AUTO: 0 %
ERYTHROCYTE [DISTWIDTH] IN BLOOD BY AUTOMATED COUNT: 18.7 % (ref 11.5–14.5)
GLUCOSE SERPL-MCNC: 193 MG/DL (ref 74–99)
HCT VFR BLD AUTO: 43.8 % (ref 41–52)
HGB BLD-MCNC: 13.2 G/DL (ref 13.5–17.5)
IMM GRANULOCYTES # BLD AUTO: 0.06 X10*3/UL (ref 0–0.5)
IMM GRANULOCYTES NFR BLD AUTO: 0.8 % (ref 0–0.9)
LYMPHOCYTES # BLD AUTO: 0.39 X10*3/UL (ref 0.8–3)
LYMPHOCYTES NFR BLD AUTO: 5.1 %
MAGNESIUM SERPL-MCNC: 2.04 MG/DL (ref 1.6–2.4)
MCH RBC QN AUTO: 25.7 PG (ref 26–34)
MCHC RBC AUTO-ENTMCNC: 30.1 G/DL (ref 32–36)
MCV RBC AUTO: 85 FL (ref 80–100)
MONOCYTES # BLD AUTO: 0.1 X10*3/UL (ref 0.05–0.8)
MONOCYTES NFR BLD AUTO: 1.3 %
NEUTROPHILS # BLD AUTO: 7.08 X10*3/UL (ref 1.6–5.5)
NEUTROPHILS NFR BLD AUTO: 92.5 %
NRBC BLD-RTO: 0 /100 WBCS (ref 0–0)
PLATELET # BLD AUTO: 168 X10*3/UL (ref 150–450)
POTASSIUM SERPL-SCNC: 4.4 MMOL/L (ref 3.5–5.3)
PROT SERPL-MCNC: 6 G/DL (ref 6.4–8.2)
RBC # BLD AUTO: 5.14 X10*6/UL (ref 4.5–5.9)
SODIUM SERPL-SCNC: 138 MMOL/L (ref 136–145)
WBC # BLD AUTO: 7.7 X10*3/UL (ref 4.4–11.3)

## 2025-05-23 PROCEDURE — 99285 EMERGENCY DEPT VISIT HI MDM: CPT | Performed by: EMERGENCY MEDICINE

## 2025-05-23 PROCEDURE — 85025 COMPLETE CBC W/AUTO DIFF WBC: CPT

## 2025-05-23 PROCEDURE — 71045 X-RAY EXAM CHEST 1 VIEW: CPT

## 2025-05-23 PROCEDURE — 83735 ASSAY OF MAGNESIUM: CPT

## 2025-05-23 PROCEDURE — 84484 ASSAY OF TROPONIN QUANT: CPT

## 2025-05-23 PROCEDURE — 93005 ELECTROCARDIOGRAM TRACING: CPT

## 2025-05-23 PROCEDURE — 2500000001 HC RX 250 WO HCPCS SELF ADMINISTERED DRUGS (ALT 637 FOR MEDICARE OP): Performed by: EMERGENCY MEDICINE

## 2025-05-23 PROCEDURE — 2500000004 HC RX 250 GENERAL PHARMACY W/ HCPCS (ALT 636 FOR OP/ED): Mod: JZ | Performed by: EMERGENCY MEDICINE

## 2025-05-23 PROCEDURE — 71045 X-RAY EXAM CHEST 1 VIEW: CPT | Performed by: STUDENT IN AN ORGANIZED HEALTH CARE EDUCATION/TRAINING PROGRAM

## 2025-05-23 PROCEDURE — 36415 COLL VENOUS BLD VENIPUNCTURE: CPT

## 2025-05-23 PROCEDURE — 80053 COMPREHEN METABOLIC PANEL: CPT

## 2025-05-23 PROCEDURE — 96374 THER/PROPH/DIAG INJ IV PUSH: CPT

## 2025-05-23 PROCEDURE — 83880 ASSAY OF NATRIURETIC PEPTIDE: CPT

## 2025-05-23 RX ORDER — ACETAMINOPHEN 325 MG/1
650 TABLET ORAL ONCE
Status: COMPLETED | OUTPATIENT
Start: 2025-05-23 | End: 2025-05-23

## 2025-05-23 RX ORDER — FUROSEMIDE 10 MG/ML
40 INJECTION INTRAMUSCULAR; INTRAVENOUS ONCE
Status: COMPLETED | OUTPATIENT
Start: 2025-05-23 | End: 2025-05-23

## 2025-05-23 RX ORDER — NAPROXEN SODIUM 220 MG/1
324 TABLET, FILM COATED ORAL ONCE
Status: DISCONTINUED | OUTPATIENT
Start: 2025-05-23 | End: 2025-05-24 | Stop reason: HOSPADM

## 2025-05-23 RX ADMIN — ACETAMINOPHEN 650 MG: 325 TABLET ORAL at 22:45

## 2025-05-23 RX ADMIN — FUROSEMIDE 40 MG: 10 INJECTION INTRAMUSCULAR; INTRAVENOUS at 22:45

## 2025-05-23 ASSESSMENT — PAIN SCALES - GENERAL
PAINLEVEL_OUTOF10: 0 - NO PAIN
PAINLEVEL_OUTOF10: 8
PAINLEVEL_OUTOF10: 8

## 2025-05-23 ASSESSMENT — PAIN - FUNCTIONAL ASSESSMENT
PAIN_FUNCTIONAL_ASSESSMENT: 0-10
PAIN_FUNCTIONAL_ASSESSMENT: 0-10

## 2025-05-23 ASSESSMENT — PAIN DESCRIPTION - LOCATION: LOCATION: CHEST

## 2025-05-23 ASSESSMENT — PAIN DESCRIPTION - DESCRIPTORS
DESCRIPTORS: ACHING;SHARP
DESCRIPTORS: ACHING;SHARP

## 2025-05-23 ASSESSMENT — COLUMBIA-SUICIDE SEVERITY RATING SCALE - C-SSRS
6. HAVE YOU EVER DONE ANYTHING, STARTED TO DO ANYTHING, OR PREPARED TO DO ANYTHING TO END YOUR LIFE?: NO
1. IN THE PAST MONTH, HAVE YOU WISHED YOU WERE DEAD OR WISHED YOU COULD GO TO SLEEP AND NOT WAKE UP?: NO
2. HAVE YOU ACTUALLY HAD ANY THOUGHTS OF KILLING YOURSELF?: NO

## 2025-05-23 ASSESSMENT — LIFESTYLE VARIABLES
TOTAL SCORE: 0
HAVE PEOPLE ANNOYED YOU BY CRITICIZING YOUR DRINKING: NO
HAVE YOU EVER FELT YOU SHOULD CUT DOWN ON YOUR DRINKING: NO
EVER FELT BAD OR GUILTY ABOUT YOUR DRINKING: NO
EVER HAD A DRINK FIRST THING IN THE MORNING TO STEADY YOUR NERVES TO GET RID OF A HANGOVER: NO

## 2025-05-23 ASSESSMENT — PAIN DESCRIPTION - PAIN TYPE: TYPE: ACUTE PAIN

## 2025-05-24 NOTE — ED TRIAGE NOTES
Pt bib EMS with c/o SOB and CP. Pt states the CP started approx 4 hours ago. States it's a sharp pain right in the middle of his chest. Pt states he has a hx of CHF and COPD. Pt is on lasix and states he has been taking it as prescribed but has noticed increased swelling in his lower extremities. Pt states the SOB is chronic bu feels as though it has gotten worse today.

## 2025-05-24 NOTE — ED PROVIDER NOTES
HPI   Chief Complaint   Patient presents with    Chest Pain    Shortness of Breath       Patient is a 70-year-old male presenting to the emergency department for evaluation of chest pain and shortness of breath.  Patient states symptom started approximately 4 hours ago.  He describes it as a sharp pain in the middle of his chest.  Nothing makes it better or worse.  He states he has had a lot of salty foods over the past 2 days and is concerned about his possible CHF.  He states that is what it feels like.  He states he is on Lasix and has been taking it as prescribed.  He denies any fevers, chills, cough, congestion, headaches, numbness, tingling.  He has noticed an increase swelling in his bilateral lower extremities.              Patient History   Medical History[1]  Surgical History[2]  Family History[3]  Social History[4]    Physical Exam   ED Triage Vitals   Temperature Heart Rate Respirations BP   05/23/25 2109 05/23/25 2032 05/23/25 2032 05/23/25 2032   37.2 °C (98.9 °F) 71 20 (!) 136/35      Pulse Ox Temp Source Heart Rate Source Patient Position   05/23/25 2032 05/23/25 2109 05/23/25 2032 --   94 % Oral Monitor       BP Location FiO2 (%)     -- --             Physical Exam  Vitals and nursing note reviewed.   Constitutional:       General: He is not in acute distress.     Appearance: He is well-developed. He is not ill-appearing or toxic-appearing.   HENT:      Head: Normocephalic and atraumatic.   Cardiovascular:      Rate and Rhythm: Normal rate and regular rhythm.      Heart sounds: Normal heart sounds.   Pulmonary:      Effort: Pulmonary effort is normal.      Breath sounds: No decreased breath sounds, wheezing, rhonchi or rales.   Abdominal:      Palpations: Abdomen is soft.      Tenderness: There is no abdominal tenderness.   Musculoskeletal:         General: Normal range of motion.      Cervical back: Normal range of motion.      Right lower leg: Edema present.      Left lower leg: Edema present.    Skin:     General: Skin is warm and dry.   Neurological:      General: No focal deficit present.      Mental Status: He is alert and oriented to person, place, and time.   Psychiatric:         Mood and Affect: Mood normal.         Behavior: Behavior normal.           ED Course & MDM   ED Course as of 05/23/25 2312   Fri May 23, 2025   2109 EKG personally turbid by me performed at 2033  Sinus rhythm first-degree AV block ventricular 88  multifocal PVCs in a trigeminy pattern no acute ischemic changes [EF]      ED Course User Index  [EF] Cynthia Harris, DO         Diagnoses as of 05/23/25 2312   Shortness of breath   Chest pain, unspecified type   Chronic heart failure with preserved ejection fraction                 No data recorded     Gayathri Coma Scale Score: 15 (05/23/25 2036 : Annamaria Somers RN)                           Medical Decision Making  **Disclaimer parts of this chart have been completed using voice recognition software. Please excuse any errors of transcription.     Patient seen in conjunction with attending physician .    HPI: Detailed above.    Exam: A medically appropriate exam performed, outlined above, given the known history and presentation.    History obtained from: Patient    EKG: Reviewed by myself.  Reviewed and interpreted by my attending physician.    Labs/Diagnostics:  Labs Reviewed   CBC WITH AUTO DIFFERENTIAL - Abnormal       Result Value    WBC 7.7      nRBC 0.0      RBC 5.14      Hemoglobin 13.2 (*)     Hematocrit 43.8      MCV 85      MCH 25.7 (*)     MCHC 30.1 (*)     RDW 18.7 (*)     Platelets 168      Neutrophils % 92.5      Immature Granulocytes %, Automated 0.8      Lymphocytes % 5.1      Monocytes % 1.3      Eosinophils % 0.0      Basophils % 0.3      Neutrophils Absolute 7.08 (*)     Immature Granulocytes Absolute, Automated 0.06      Lymphocytes Absolute 0.39 (*)     Monocytes Absolute 0.10      Eosinophils Absolute 0.00      Basophils Absolute 0.02      COMPREHENSIVE METABOLIC PANEL - Abnormal    Glucose 193 (*)     Sodium 138      Potassium 4.4      Chloride 103      Bicarbonate 29      Anion Gap 10      Urea Nitrogen 46 (*)     Creatinine 1.46 (*)     eGFR 51 (*)     Calcium 8.5 (*)     Albumin 3.7      Alkaline Phosphatase 68      Total Protein 6.0 (*)     AST 17      Bilirubin, Total 0.4      ALT 20     B-TYPE NATRIURETIC PEPTIDE - Abnormal     (*)     Narrative:        <100 pg/mL - Heart failure unlikely  100-299 pg/mL - Intermediate probability of acute heart                  failure exacerbation. Correlate with clinical                  context and patient history.    >=300 pg/mL - Heart Failure likely. Correlate with clinical                  context and patient history.    BNP testing is performed using different testing methodology at [facility] than at other [facility]. Direct result comparisons should only be made within the same method.      MAGNESIUM - Normal    Magnesium 2.04     SERIAL TROPONIN-INITIAL - Normal    Troponin I, High Sensitivity 17      Narrative:     Less than 99th percentile of normal range cutoff-  Female and children under 18 years old <14 ng/L; Male <21 ng/L: Negative  Repeat testing should be performed if clinically indicated.     Female and children under 18 years old 14-50 ng/L; Male 21-50 ng/L:  Consistent with possible cardiac damage and possible increased clinical   risk. Serial measurements may help to assess extent of myocardial damage.     >50 ng/L: Consistent with cardiac damage, increased clinical risk and  myocardial infarction. Serial measurements may help assess extent of   myocardial damage.      NOTE: Children less than 1 year old may have higher baseline troponin   levels and results should be interpreted in conjunction with the overall   clinical context.     NOTE: Troponin I testing is performed using a different   testing methodology at [facility] than at other    [facility]. Direct result comparisons should only   be made within the same method.   SERIAL TROPONIN, 1 HOUR - Normal    Troponin I, High Sensitivity 16      Narrative:     Less than 99th percentile of normal range cutoff-  Female and children under 18 years old <14 ng/L; Male <21 ng/L: Negative  Repeat testing should be performed if clinically indicated.     Female and children under 18 years old 14-50 ng/L; Male 21-50 ng/L:  Consistent with possible cardiac damage and possible increased clinical   risk. Serial measurements may help to assess extent of myocardial damage.     >50 ng/L: Consistent with cardiac damage, increased clinical risk and  myocardial infarction. Serial measurements may help assess extent of   myocardial damage.      NOTE: Children less than 1 year old may have higher baseline troponin   levels and results should be interpreted in conjunction with the overall   clinical context.     NOTE: Troponin I testing is performed using a different   testing methodology at [facility] than at other   [facility]. Direct result comparisons should only   be made within the same method.   TROPONIN SERIES- (INITIAL, 1 HR)    Narrative:     The following orders were created for panel order Troponin I Series, High Sensitivity (0, 1 HR).  Procedure                               Abnormality         Status                     ---------                               -----------         ------                     Troponin I, High Sensiti...[[id number]]  Normal              Final result               Troponin, High Sensitivi...[[id number]]  Normal              Final result                 Please view results for these tests on the individual orders.     XR chest 1 view   Final Result   1.  No evidence of acute cardiopulmonary process.                  MACRO:   None        Signed by: Jeffrey Baker 5/23/2025 9:14 PM   Dictation workstation:   [id number]        EMERGENCY DEPARTMENT COURSE  "and DIFFERENTIAL DIAGNOSIS/MDM:  Patient is a 70-year-old male presenting to the emergency department for evaluation of chest pain and shortness of breath.  On physical exam vital signs stable and patient is in no acute distress.  He has bilateral lower extremity swelling.  He is satting at 94 percent on room air with no increased work of breathing.  Patient has been seen in multiple emergency departments for the same thing in the past.  Cardiac workup initiated.  P.o. aspirin ordered for the chest pain.  CMP showed a creatinine of 1.46 and EGFR of 51 which is consistent with previous labs.  CBC showed no leukocytosis with a hemoglobin of 13.2.  Magnesium normal.  Chest x-ray showed no evidence of acute cardiopulmonary process with no pneumonia, pneumothorax however there is some cardiomegaly similar to prior x-ray.  Initial troponin 17 and repeat troponin 16 therefore low suspicion for ACS.   therefore low suspicion for CHF exacerbation.  Patient given IV Lasix as well as p.o. Tylenol for pain.  He was advised to follow-up with cardiology, pulmonology, primary care physician outpatient.  He reportedly has an appointment tomorrow with his PCP.  He was discharged in stable condition.  He will return to the emergency department with any new or worsening symptoms.  Return precautions discussed.    The patient presented with a chief complaint of shortness of breath. The differential diagnosis associated with this patient's presentation includes cough, asthma, atrial fibrillation, congestive heart failure, pulmonary edema, acute MI, pneumonia, pneumothorax, pulmonary embolus, sepsis, SIRS, URI, bronchitis, foreign body aspiration.     Vitals:    Vitals:    05/23/25 2032 05/23/25 2036 05/23/25 2109 05/23/25 2259   BP: (!) 136/35   142/68   Pulse: 71   70   Resp: 20   18   Temp:   37.2 °C (98.9 °F) 36.6 °C (97.9 °F)   TempSrc:   Oral Temporal   SpO2: 94% 94%  95%   Weight: 147 kg (325 lb)      Height: 1.93 m (6' 4\") "        History Limited by:    None    Independent history obtained from:    None    External records reviewed:    Inpatient Notes/Discharge Summary from previous emergency department visits over the past few months where patient has been seen for the same symptoms.    Diagnostics interpreted by me:    Xrays - see my independent interpretation in MDM    Discussions with other clinicians:    None    Chronic conditions impacting care:    Hypertension, COPD, and Heart Disease    Social determinants of health affecting care:    None    Diagnostic tests considered but not performed: None    ED Medications managed:    Medications   aspirin chewable tablet 324 mg (324 mg oral Not Given 5/23/25 2050)   furosemide (Lasix) injection 40 mg (40 mg intravenous Given 5/23/25 2245)   acetaminophen (Tylenol) tablet 650 mg (650 mg oral Given 5/23/25 2245)       Prescription drugs considered:    None    Screenings:              Procedure  Procedures       [1]   Past Medical History:  Diagnosis Date    DVT (deep venous thrombosis) (Multi)     Gout     History of pulmonary embolus (PE)     Hypertension     MI (myocardial infarction) (Multi)     Stroke (Multi)    [2]   Past Surgical History:  Procedure Laterality Date    MR HEAD ANGIO WO IV CONTRAST  7/7/2015    MR HEAD ANGIO WO IV CONTRAST [facility]    MR HEAD ANGIO WO IV CONTRAST  7/9/2015    MR HEAD ANGIO WO IV CONTRAST [facility]   [3] No family history on file.  [4]   Social History  Tobacco Use    Smoking status: Former     Types: Cigarettes    Smokeless tobacco: Never   Substance Use Topics    Alcohol use: Not Currently    Drug use: Never        Tesha Cano PA-C  05/23/25 9034

## 2025-05-27 LAB
ATRIAL RATE: 88 BPM
P AXIS: 86 DEGREES
P OFFSET: 153 MS
P ONSET: 87 MS
PR INTERVAL: 254 MS
Q ONSET: 214 MS
QRS COUNT: 14 BEATS
QRS DURATION: 142 MS
QT INTERVAL: 434 MS
QTC CALCULATION(BAZETT): 525 MS
QTC FREDERICIA: 493 MS
R AXIS: -30 DEGREES
T AXIS: 78 DEGREES
T OFFSET: 431 MS
VENTRICULAR RATE: 88 BPM

## 2025-07-04 ENCOUNTER — HOSPITAL ENCOUNTER (EMERGENCY)
Facility: HOSPITAL | Age: 70
Discharge: HOME | End: 2025-07-05
Attending: STUDENT IN AN ORGANIZED HEALTH CARE EDUCATION/TRAINING PROGRAM
Payer: COMMERCIAL

## 2025-07-04 DIAGNOSIS — L03.116 CELLULITIS OF LEFT LOWER EXTREMITY: ICD-10-CM

## 2025-07-04 DIAGNOSIS — M79.605 ACUTE LEG PAIN, LEFT: Primary | ICD-10-CM

## 2025-07-04 LAB
BASOPHILS # BLD AUTO: 0.03 X10*3/UL (ref 0–0.1)
BASOPHILS NFR BLD AUTO: 0.4 %
EOSINOPHIL # BLD AUTO: 0.27 X10*3/UL (ref 0–0.4)
EOSINOPHIL NFR BLD AUTO: 3.9 %
ERYTHROCYTE [DISTWIDTH] IN BLOOD BY AUTOMATED COUNT: 17.2 % (ref 11.5–14.5)
HCT VFR BLD AUTO: 44.4 % (ref 41–52)
HGB BLD-MCNC: 13.8 G/DL (ref 13.5–17.5)
IMM GRANULOCYTES # BLD AUTO: 0.03 X10*3/UL (ref 0–0.5)
IMM GRANULOCYTES NFR BLD AUTO: 0.4 % (ref 0–0.9)
LYMPHOCYTES # BLD AUTO: 0.95 X10*3/UL (ref 0.8–3)
LYMPHOCYTES NFR BLD AUTO: 13.6 %
MCH RBC QN AUTO: 27.3 PG (ref 26–34)
MCHC RBC AUTO-ENTMCNC: 31.1 G/DL (ref 32–36)
MCV RBC AUTO: 88 FL (ref 80–100)
MONOCYTES # BLD AUTO: 0.78 X10*3/UL (ref 0.05–0.8)
MONOCYTES NFR BLD AUTO: 11.2 %
NEUTROPHILS # BLD AUTO: 4.92 X10*3/UL (ref 1.6–5.5)
NEUTROPHILS NFR BLD AUTO: 70.5 %
NRBC BLD-RTO: 0 /100 WBCS (ref 0–0)
PLATELET # BLD AUTO: 175 X10*3/UL (ref 150–450)
RBC # BLD AUTO: 5.05 X10*6/UL (ref 4.5–5.9)
WBC # BLD AUTO: 7 X10*3/UL (ref 4.4–11.3)

## 2025-07-04 PROCEDURE — 80053 COMPREHEN METABOLIC PANEL: CPT | Performed by: STUDENT IN AN ORGANIZED HEALTH CARE EDUCATION/TRAINING PROGRAM

## 2025-07-04 PROCEDURE — 96375 TX/PRO/DX INJ NEW DRUG ADDON: CPT

## 2025-07-04 PROCEDURE — 84100 ASSAY OF PHOSPHORUS: CPT | Performed by: STUDENT IN AN ORGANIZED HEALTH CARE EDUCATION/TRAINING PROGRAM

## 2025-07-04 PROCEDURE — 85025 COMPLETE CBC W/AUTO DIFF WBC: CPT | Performed by: STUDENT IN AN ORGANIZED HEALTH CARE EDUCATION/TRAINING PROGRAM

## 2025-07-04 PROCEDURE — 83735 ASSAY OF MAGNESIUM: CPT | Performed by: STUDENT IN AN ORGANIZED HEALTH CARE EDUCATION/TRAINING PROGRAM

## 2025-07-04 PROCEDURE — 2500000004 HC RX 250 GENERAL PHARMACY W/ HCPCS (ALT 636 FOR OP/ED): Performed by: STUDENT IN AN ORGANIZED HEALTH CARE EDUCATION/TRAINING PROGRAM

## 2025-07-04 PROCEDURE — 99285 EMERGENCY DEPT VISIT HI MDM: CPT | Performed by: STUDENT IN AN ORGANIZED HEALTH CARE EDUCATION/TRAINING PROGRAM

## 2025-07-04 PROCEDURE — 36415 COLL VENOUS BLD VENIPUNCTURE: CPT | Performed by: STUDENT IN AN ORGANIZED HEALTH CARE EDUCATION/TRAINING PROGRAM

## 2025-07-04 RX ORDER — FENTANYL CITRATE 50 UG/ML
50 INJECTION, SOLUTION INTRAMUSCULAR; INTRAVENOUS ONCE
Status: COMPLETED | OUTPATIENT
Start: 2025-07-04 | End: 2025-07-04

## 2025-07-04 RX ADMIN — FENTANYL CITRATE 50 MCG: 50 INJECTION INTRAMUSCULAR; INTRAVENOUS at 23:43

## 2025-07-04 ASSESSMENT — LIFESTYLE VARIABLES
TOTAL SCORE: 0
HAVE YOU EVER FELT YOU SHOULD CUT DOWN ON YOUR DRINKING: NO
HAVE PEOPLE ANNOYED YOU BY CRITICIZING YOUR DRINKING: NO
EVER HAD A DRINK FIRST THING IN THE MORNING TO STEADY YOUR NERVES TO GET RID OF A HANGOVER: NO
EVER FELT BAD OR GUILTY ABOUT YOUR DRINKING: NO

## 2025-07-04 ASSESSMENT — PAIN - FUNCTIONAL ASSESSMENT: PAIN_FUNCTIONAL_ASSESSMENT: 0-10

## 2025-07-04 ASSESSMENT — PAIN DESCRIPTION - ORIENTATION: ORIENTATION: LEFT

## 2025-07-04 ASSESSMENT — PAIN SCALES - GENERAL: PAINLEVEL_OUTOF10: 8

## 2025-07-04 ASSESSMENT — PAIN DESCRIPTION - LOCATION: LOCATION: LEG

## 2025-07-05 ENCOUNTER — APPOINTMENT (OUTPATIENT)
Dept: RADIOLOGY | Facility: HOSPITAL | Age: 70
End: 2025-07-05
Payer: COMMERCIAL

## 2025-07-05 VITALS
RESPIRATION RATE: 14 BRPM | WEIGHT: 315 LBS | SYSTOLIC BLOOD PRESSURE: 123 MMHG | OXYGEN SATURATION: 97 % | HEIGHT: 75 IN | BODY MASS INDEX: 39.17 KG/M2 | HEART RATE: 70 BPM | TEMPERATURE: 96.8 F | DIASTOLIC BLOOD PRESSURE: 86 MMHG

## 2025-07-05 LAB
ALBUMIN SERPL BCP-MCNC: 3.8 G/DL (ref 3.4–5)
ALP SERPL-CCNC: 77 U/L (ref 33–136)
ALT SERPL W P-5'-P-CCNC: 13 U/L (ref 10–52)
ANION GAP SERPL CALCULATED.3IONS-SCNC: 10 MMOL/L (ref 10–20)
AST SERPL W P-5'-P-CCNC: 17 U/L (ref 9–39)
BILIRUB SERPL-MCNC: 0.4 MG/DL (ref 0–1.2)
BUN SERPL-MCNC: 41 MG/DL (ref 6–23)
CALCIUM SERPL-MCNC: 9.2 MG/DL (ref 8.6–10.3)
CHLORIDE SERPL-SCNC: 97 MMOL/L (ref 98–107)
CO2 SERPL-SCNC: 34 MMOL/L (ref 21–32)
CREAT SERPL-MCNC: 1.84 MG/DL (ref 0.5–1.3)
EGFRCR SERPLBLD CKD-EPI 2021: 38 ML/MIN/1.73M*2
GLUCOSE SERPL-MCNC: 87 MG/DL (ref 74–99)
MAGNESIUM SERPL-MCNC: 2.02 MG/DL (ref 1.6–2.4)
PHOSPHATE SERPL-MCNC: 3.6 MG/DL (ref 2.5–4.9)
POTASSIUM SERPL-SCNC: 4.2 MMOL/L (ref 3.5–5.3)
PROT SERPL-MCNC: 6.4 G/DL (ref 6.4–8.2)
SODIUM SERPL-SCNC: 137 MMOL/L (ref 136–145)

## 2025-07-05 PROCEDURE — 96375 TX/PRO/DX INJ NEW DRUG ADDON: CPT

## 2025-07-05 PROCEDURE — 73700 CT LOWER EXTREMITY W/O DYE: CPT | Mod: LEFT SIDE | Performed by: RADIOLOGY

## 2025-07-05 PROCEDURE — 96376 TX/PRO/DX INJ SAME DRUG ADON: CPT

## 2025-07-05 PROCEDURE — 2500000001 HC RX 250 WO HCPCS SELF ADMINISTERED DRUGS (ALT 637 FOR MEDICARE OP): Performed by: STUDENT IN AN ORGANIZED HEALTH CARE EDUCATION/TRAINING PROGRAM

## 2025-07-05 PROCEDURE — 2500000004 HC RX 250 GENERAL PHARMACY W/ HCPCS (ALT 636 FOR OP/ED)

## 2025-07-05 PROCEDURE — 93971 EXTREMITY STUDY: CPT | Performed by: RADIOLOGY

## 2025-07-05 PROCEDURE — 96374 THER/PROPH/DIAG INJ IV PUSH: CPT

## 2025-07-05 PROCEDURE — 2500000004 HC RX 250 GENERAL PHARMACY W/ HCPCS (ALT 636 FOR OP/ED): Mod: JZ | Performed by: STUDENT IN AN ORGANIZED HEALTH CARE EDUCATION/TRAINING PROGRAM

## 2025-07-05 PROCEDURE — 93971 EXTREMITY STUDY: CPT

## 2025-07-05 PROCEDURE — 73700 CT LOWER EXTREMITY W/O DYE: CPT | Mod: LT

## 2025-07-05 RX ORDER — OXYCODONE AND ACETAMINOPHEN 5; 325 MG/1; MG/1
1 TABLET ORAL ONCE
Refills: 0 | Status: COMPLETED | OUTPATIENT
Start: 2025-07-05 | End: 2025-07-05

## 2025-07-05 RX ORDER — CLINDAMYCIN HYDROCHLORIDE 150 MG/1
450 CAPSULE ORAL 3 TIMES DAILY
Qty: 63 CAPSULE | Refills: 0 | Status: SHIPPED | OUTPATIENT
Start: 2025-07-05 | End: 2025-07-12

## 2025-07-05 RX ORDER — HYDROCODONE BITARTRATE AND ACETAMINOPHEN 5; 325 MG/1; MG/1
1 TABLET ORAL EVERY 6 HOURS PRN
Qty: 5 TABLET | Refills: 0 | Status: SHIPPED | OUTPATIENT
Start: 2025-07-05 | End: 2025-07-08

## 2025-07-05 RX ORDER — MORPHINE SULFATE 4 MG/ML
4 INJECTION, SOLUTION INTRAMUSCULAR; INTRAVENOUS ONCE
Status: COMPLETED | OUTPATIENT
Start: 2025-07-05 | End: 2025-07-05

## 2025-07-05 RX ORDER — FENTANYL CITRATE 50 UG/ML
50 INJECTION, SOLUTION INTRAMUSCULAR; INTRAVENOUS ONCE
Status: COMPLETED | OUTPATIENT
Start: 2025-07-05 | End: 2025-07-05

## 2025-07-05 RX ADMIN — FENTANYL CITRATE 50 MCG: 50 INJECTION INTRAMUSCULAR; INTRAVENOUS at 07:33

## 2025-07-05 RX ADMIN — HYDROMORPHONE HYDROCHLORIDE 0.5 MG: 0.5 INJECTION, SOLUTION INTRAMUSCULAR; INTRAVENOUS; SUBCUTANEOUS at 01:24

## 2025-07-05 RX ADMIN — CLINDAMYCIN HYDROCHLORIDE 450 MG: 300 CAPSULE ORAL at 02:29

## 2025-07-05 RX ADMIN — OXYCODONE HYDROCHLORIDE AND ACETAMINOPHEN 1 TABLET: 5; 325 TABLET ORAL at 03:54

## 2025-07-05 RX ADMIN — HYDROMORPHONE HYDROCHLORIDE 0.5 MG: 0.5 INJECTION, SOLUTION INTRAMUSCULAR; INTRAVENOUS; SUBCUTANEOUS at 02:29

## 2025-07-05 RX ADMIN — MORPHINE SULFATE 4 MG: 4 INJECTION, SOLUTION INTRAMUSCULAR; INTRAVENOUS at 00:33

## 2025-07-05 ASSESSMENT — PAIN DESCRIPTION - ORIENTATION
ORIENTATION: LEFT
ORIENTATION: LEFT

## 2025-07-05 ASSESSMENT — PAIN SCALES - GENERAL
PAINLEVEL_OUTOF10: 8
PAINLEVEL_OUTOF10: 7
PAINLEVEL_OUTOF10: 8

## 2025-07-05 ASSESSMENT — PAIN DESCRIPTION - LOCATION
LOCATION: LEG
LOCATION: LEG

## 2025-07-05 ASSESSMENT — PAIN - FUNCTIONAL ASSESSMENT
PAIN_FUNCTIONAL_ASSESSMENT: 0-10
PAIN_FUNCTIONAL_ASSESSMENT: 0-10

## 2025-07-05 NOTE — DISCHARGE INSTRUCTIONS
You are seen today for leg pain, no cellulitis, you have no DVT, please take your antibiotic as directed

## 2025-07-05 NOTE — ED PROVIDER NOTES
Emergency Department Provider Note       History of Present Illness     History provided by: Patient  Limitations to History: None  External Records Reviewed with Brief Summary: Prior inpatient notes    HPI:  Kapil Luna is a 70 y.o. male who presents with left leg pain.  Patient states that he was at home when he developed gradual onset leg pain.  States that it got significantly worse prompting him to come to the Emergency Department for evaluation.  Prior history of DVTs, on Eliquis.  Prior history of cellulitis.    Physical Exam   Triage vitals:  T 36 °C (96.8 °F)  HR 74  BP (!) 74/44  RR 16  O2 98 % Supplemental oxygen    Physical Exam  Vitals and nursing note reviewed.   Constitutional:       General: He is not in acute distress.     Appearance: He is not ill-appearing.   HENT:      Head: Normocephalic and atraumatic.      Mouth/Throat:      Mouth: Mucous membranes are moist.      Pharynx: Oropharynx is clear.   Eyes:      Extraocular Movements: Extraocular movements intact.      Conjunctiva/sclera: Conjunctivae normal.      Pupils: Pupils are equal, round, and reactive to light.   Cardiovascular:      Rate and Rhythm: Normal rate and regular rhythm.   Pulmonary:      Effort: Pulmonary effort is normal. No respiratory distress.      Breath sounds: Normal breath sounds.   Abdominal:      General: There is no distension.      Palpations: Abdomen is soft.      Tenderness: There is no abdominal tenderness.   Musculoskeletal:         General: Swelling (increased swelling to distal left extremity compared to right) present. No deformity. Normal range of motion.      Cervical back: Normal range of motion and neck supple.      Right lower leg: Edema present.      Left lower leg: Edema present.      Comments: LLE with tenderness to touch over the skin, no deformities or wounds, increased erythema   Skin:     General: Skin is warm and dry.      Capillary Refill: Capillary refill takes less than 2 seconds.       Comments: Venous stasis skin changes noted bilaterally   Neurological:      General: No focal deficit present.      Mental Status: He is alert and oriented to person, place, and time. Mental status is at baseline.   Psychiatric:         Mood and Affect: Mood normal.         Behavior: Behavior normal.           Medical Decision Making & ED Course   Medical Decision Makin y.o. male who presents with leg pain.  Considered DVT, cellulitis, peripheral swelling, fracture.  No trauma to the leg.  Labs obtained, normal electrolytes, low suspicion for muscle cramping.  Patient with venous stasis changes but mild erythema noted over the area of an old graft.  Patient with significant edema noted to the left lower extremity compared to right, it is likely that the patient is suffering from cellulitis and it would be in his best interest to treat him.  Attempted to better evaluate the patient's lower extremity with CT imaging however unable to obtain with contrast secondary to poor renal function at baseline.  CT Noncon extremely limited but evidence of soft tissue swelling.  Patient would prefer to remain in the emergency department until ultrasound team arrives in the morning in order to receive a DVT ultrasound.  Patient pain treated with IV and oral pain medications and given initial dose of antibiotic.    Patient care turned over to on-coming provider for follow-up DVT ultrasound.  ----      Social Determinants of Health which Significantly Impact Care: Social Determinants of Health which Significantly Impact Care: None identified     EKG Independent Interpretation: EKG not obtained    Independent Result Review and Interpretation: Relevant laboratory and radiographic results were reviewed and independently interpreted by myself.  As necessary, they are commented on in the ED Course.    Chronic conditions affecting the patient's care: As documented above in MDM    The patient was discussed with the following  consultants/services: None    Care Considerations: As documented above in [facility]    ED Course:  Diagnoses as of 07/05/25 0541   Acute leg pain, left   Cellulitis of left lower extremity       Disposition   Patient was signed out to oncoming provider at end of shift pending completion of their work-up.  Please see the next provider's transition of care note for the remainder of the patient's care.     Procedures   Procedures    Arcelia Thompson MD  Emergency Medicine          Arcelia Thompson MD  07/05/25 0532

## 2025-07-05 NOTE — ED PROVIDER NOTES
I assumed care of this patient at shift change, patient pending results of his ultrasound.  Patient's ultrasound was negative, patient was given medicine for breakthrough pain, and an antibiotic for cellulitis, return precautions were discussed with patient, he is agreeable this discharge plan.    Diagnosis: Leg pain, cellulitis  Lower extremity venous duplex left   Final Result   No sonographic evidence for deep vein thrombosis within the evaluated   veins.        MACRO:   None.        Signed by: Philippe Armando 7/5/2025 9:54 AM   Dictation workstation:   [id number]      CT tibia fibula left wo IV contrast   Final Result   1. Superficial soft tissue swelling involving the distal lower leg   without evidence of fluid collection. No subcutaneous gas.   2. Additional chronic/incidental findings described above.        Signed by: Jesus Arias 7/5/2025 1:42 AM   Dictation workstation:   [id number]        Results for orders placed or performed during the hospital encounter of 07/04/25   CBC and Auto Differential    Collection Time: 07/04/25 11:40 PM   Result Value Ref Range    WBC 7.0 4.4 - 11.3 x10*3/uL    nRBC 0.0 0.0 - 0.0 /100 WBCs    RBC 5.05 4.50 - 5.90 x10*6/uL    Hemoglobin 13.8 13.5 - 17.5 g/dL    Hematocrit 44.4 41.0 - 52.0 %    MCV 88 80 - 100 fL    MCH 27.3 26.0 - 34.0 pg    MCHC 31.1 (L) 32.0 - 36.0 g/dL    RDW 17.2 (H) 11.5 - 14.5 %    Platelets 175 150 - 450 x10*3/uL    Neutrophils % 70.5 40.0 - 80.0 %    Immature Granulocytes %, Automated 0.4 0.0 - 0.9 %    Lymphocytes % 13.6 13.0 - 44.0 %    Monocytes % 11.2 2.0 - 10.0 %    Eosinophils % 3.9 0.0 - 6.0 %    Basophils % 0.4 0.0 - 2.0 %    Neutrophils Absolute 4.92 1.60 - 5.50 x10*3/uL    Immature Granulocytes Absolute, Automated 0.03 0.00 - 0.50 x10*3/uL    Lymphocytes Absolute 0.95 0.80 - 3.00 x10*3/uL    Monocytes Absolute 0.78 0.05 - 0.80 x10*3/uL    Eosinophils Absolute 0.27 0.00 - 0.40 x10*3/uL    Basophils Absolute 0.03 0.00 - 0.10 x10*3/uL    Comprehensive metabolic panel    Collection Time: 07/04/25 11:40 PM   Result Value Ref Range    Glucose 87 74 - 99 mg/dL    Sodium 137 136 - 145 mmol/L    Potassium 4.2 3.5 - 5.3 mmol/L    Chloride 97 (L) 98 - 107 mmol/L    Bicarbonate 34 (H) 21 - 32 mmol/L    Anion Gap 10 10 - 20 mmol/L    Urea Nitrogen 41 (H) 6 - 23 mg/dL    Creatinine 1.84 (H) 0.50 - 1.30 mg/dL    eGFR 38 (L) >60 mL/min/1.73m*2    Calcium 9.2 8.6 - 10.3 mg/dL    Albumin 3.8 3.4 - 5.0 g/dL    Alkaline Phosphatase 77 33 - 136 U/L    Total Protein 6.4 6.4 - 8.2 g/dL    AST 17 9 - 39 U/L    Bilirubin, Total 0.4 0.0 - 1.2 mg/dL    ALT 13 10 - 52 U/L   Magnesium    Collection Time: 07/04/25 11:40 PM   Result Value Ref Range    Magnesium 2.02 1.60 - 2.40 mg/dL   Phosphorus    Collection Time: 07/04/25 11:40 PM   Result Value Ref Range    Phosphorus 3.6 2.5 - 4.9 mg/dL         Sections of this report were created using voice-to-text technology and may contain errors in translation    Enrique Mcdowell DO  Emergency Medicine         Enrique Mcdowell DO  07/05/25 6530

## 2025-07-26 ENCOUNTER — APPOINTMENT (OUTPATIENT)
Dept: CARDIOLOGY | Facility: HOSPITAL | Age: 70
End: 2025-07-26
Payer: COMMERCIAL

## 2025-07-26 ENCOUNTER — APPOINTMENT (OUTPATIENT)
Dept: RADIOLOGY | Facility: HOSPITAL | Age: 70
End: 2025-07-26
Payer: COMMERCIAL

## 2025-07-26 ENCOUNTER — HOSPITAL ENCOUNTER (OUTPATIENT)
Facility: HOSPITAL | Age: 70
Setting detail: OBSERVATION
End: 2025-07-26
Attending: STUDENT IN AN ORGANIZED HEALTH CARE EDUCATION/TRAINING PROGRAM | Admitting: INTERNAL MEDICINE
Payer: COMMERCIAL

## 2025-07-26 DIAGNOSIS — I50.33 ACUTE ON CHRONIC HEART FAILURE WITH PRESERVED EJECTION FRACTION: ICD-10-CM

## 2025-07-26 DIAGNOSIS — J96.11 CHRONIC RESPIRATORY FAILURE WITH HYPOXIA: ICD-10-CM

## 2025-07-26 DIAGNOSIS — R07.9 ACUTE CHEST PAIN: Primary | ICD-10-CM

## 2025-07-26 DIAGNOSIS — J98.4 RESTRICTIVE LUNG DISEASE: ICD-10-CM

## 2025-07-26 DIAGNOSIS — R06.02 SHORTNESS OF BREATH: ICD-10-CM

## 2025-07-26 DIAGNOSIS — I50.31 ACUTE HEART FAILURE WITH PRESERVED EJECTION FRACTION: ICD-10-CM

## 2025-07-26 LAB
ALBUMIN SERPL BCP-MCNC: 3.8 G/DL (ref 3.4–5)
ALP SERPL-CCNC: 92 U/L (ref 33–136)
ALT SERPL W P-5'-P-CCNC: 14 U/L (ref 10–52)
ANION GAP BLDV CALCULATED.4IONS-SCNC: 10 MMOL/L (ref 10–25)
ANION GAP SERPL CALCULATED.3IONS-SCNC: 11 MMOL/L (ref 10–20)
AST SERPL W P-5'-P-CCNC: 18 U/L (ref 9–39)
BASE EXCESS BLDV CALC-SCNC: 4.9 MMOL/L (ref -2–3)
BASOPHILS # BLD AUTO: 0.02 X10*3/UL (ref 0–0.1)
BASOPHILS NFR BLD AUTO: 0.3 %
BILIRUB SERPL-MCNC: 0.6 MG/DL (ref 0–1.2)
BNP SERPL-MCNC: 46 PG/ML (ref 0–99)
BODY TEMPERATURE: 37 DEGREES CELSIUS
BUN SERPL-MCNC: 49 MG/DL (ref 6–23)
CA-I BLDV-SCNC: 1.18 MMOL/L (ref 1.1–1.33)
CALCIUM SERPL-MCNC: 8.9 MG/DL (ref 8.6–10.3)
CARDIAC TROPONIN I PNL SERPL HS: 13 NG/L (ref 0–20)
CARDIAC TROPONIN I PNL SERPL HS: 14 NG/L (ref 0–20)
CHLORIDE BLDV-SCNC: 97 MMOL/L (ref 98–107)
CHLORIDE SERPL-SCNC: 99 MMOL/L (ref 98–107)
CO2 SERPL-SCNC: 29 MMOL/L (ref 21–32)
CREAT SERPL-MCNC: 2.01 MG/DL (ref 0.5–1.3)
EGFRCR SERPLBLD CKD-EPI 2021: 35 ML/MIN/1.73M*2
EOSINOPHIL # BLD AUTO: 0.26 X10*3/UL (ref 0–0.4)
EOSINOPHIL NFR BLD AUTO: 3.8 %
ERYTHROCYTE [DISTWIDTH] IN BLOOD BY AUTOMATED COUNT: 16.6 % (ref 11.5–14.5)
FLUAV RNA RESP QL NAA+PROBE: NOT DETECTED
FLUBV RNA RESP QL NAA+PROBE: NOT DETECTED
GLUCOSE BLDV-MCNC: 107 MG/DL (ref 74–99)
GLUCOSE SERPL-MCNC: 97 MG/DL (ref 74–99)
HCO3 BLDV-SCNC: 31.8 MMOL/L (ref 22–26)
HCT VFR BLD AUTO: 45.2 % (ref 41–52)
HCT VFR BLD EST: 44 % (ref 41–52)
HGB BLD-MCNC: 14.1 G/DL (ref 13.5–17.5)
HGB BLDV-MCNC: 14.8 G/DL (ref 13.5–17.5)
IMM GRANULOCYTES # BLD AUTO: 0.03 X10*3/UL (ref 0–0.5)
IMM GRANULOCYTES NFR BLD AUTO: 0.4 % (ref 0–0.9)
INHALED O2 CONCENTRATION: 96 %
LACTATE BLDV-SCNC: 1.3 MMOL/L (ref 0.4–2)
LYMPHOCYTES # BLD AUTO: 0.65 X10*3/UL (ref 0.8–3)
LYMPHOCYTES NFR BLD AUTO: 9.6 %
MAGNESIUM SERPL-MCNC: 2.49 MG/DL (ref 1.6–2.4)
MCH RBC QN AUTO: 27.4 PG (ref 26–34)
MCHC RBC AUTO-ENTMCNC: 31.2 G/DL (ref 32–36)
MCV RBC AUTO: 88 FL (ref 80–100)
MONOCYTES # BLD AUTO: 0.73 X10*3/UL (ref 0.05–0.8)
MONOCYTES NFR BLD AUTO: 10.7 %
NEUTROPHILS # BLD AUTO: 5.11 X10*3/UL (ref 1.6–5.5)
NEUTROPHILS NFR BLD AUTO: 75.2 %
NRBC BLD-RTO: 0 /100 WBCS (ref 0–0)
OXYHGB MFR BLDV: 82.1 % (ref 45–75)
PCO2 BLDV: 55 MM HG (ref 41–51)
PH BLDV: 7.37 PH (ref 7.33–7.43)
PLATELET # BLD AUTO: 194 X10*3/UL (ref 150–450)
PO2 BLDV: 49 MM HG (ref 35–45)
POTASSIUM BLDV-SCNC: 4.6 MMOL/L (ref 3.5–5.3)
POTASSIUM SERPL-SCNC: 4.4 MMOL/L (ref 3.5–5.3)
PROT SERPL-MCNC: 6.5 G/DL (ref 6.4–8.2)
RBC # BLD AUTO: 5.15 X10*6/UL (ref 4.5–5.9)
SAO2 % BLDV: 85 % (ref 45–75)
SARS-COV-2 RNA RESP QL NAA+PROBE: NOT DETECTED
SODIUM BLDV-SCNC: 134 MMOL/L (ref 136–145)
SODIUM SERPL-SCNC: 135 MMOL/L (ref 136–145)
WBC # BLD AUTO: 6.8 X10*3/UL (ref 4.4–11.3)

## 2025-07-26 PROCEDURE — 93005 ELECTROCARDIOGRAM TRACING: CPT

## 2025-07-26 PROCEDURE — 85025 COMPLETE CBC W/AUTO DIFF WBC: CPT

## 2025-07-26 PROCEDURE — 71045 X-RAY EXAM CHEST 1 VIEW: CPT | Mod: FOREIGN READ | Performed by: RADIOLOGY

## 2025-07-26 PROCEDURE — 83735 ASSAY OF MAGNESIUM: CPT

## 2025-07-26 PROCEDURE — 84484 ASSAY OF TROPONIN QUANT: CPT

## 2025-07-26 PROCEDURE — 96374 THER/PROPH/DIAG INJ IV PUSH: CPT | Mod: 59

## 2025-07-26 PROCEDURE — 83880 ASSAY OF NATRIURETIC PEPTIDE: CPT

## 2025-07-26 PROCEDURE — 71045 X-RAY EXAM CHEST 1 VIEW: CPT

## 2025-07-26 PROCEDURE — 36415 COLL VENOUS BLD VENIPUNCTURE: CPT

## 2025-07-26 PROCEDURE — 84132 ASSAY OF SERUM POTASSIUM: CPT | Mod: 59

## 2025-07-26 PROCEDURE — 84132 ASSAY OF SERUM POTASSIUM: CPT

## 2025-07-26 PROCEDURE — 87636 SARSCOV2 & INF A&B AMP PRB: CPT

## 2025-07-26 PROCEDURE — 2500000004 HC RX 250 GENERAL PHARMACY W/ HCPCS (ALT 636 FOR OP/ED): Mod: JZ | Performed by: STUDENT IN AN ORGANIZED HEALTH CARE EDUCATION/TRAINING PROGRAM

## 2025-07-26 PROCEDURE — 96375 TX/PRO/DX INJ NEW DRUG ADDON: CPT | Mod: 59

## 2025-07-26 PROCEDURE — 99285 EMERGENCY DEPT VISIT HI MDM: CPT | Mod: 25 | Performed by: STUDENT IN AN ORGANIZED HEALTH CARE EDUCATION/TRAINING PROGRAM

## 2025-07-26 PROCEDURE — 2500000004 HC RX 250 GENERAL PHARMACY W/ HCPCS (ALT 636 FOR OP/ED): Mod: JZ

## 2025-07-26 RX ORDER — LABETALOL HYDROCHLORIDE 5 MG/ML
20 INJECTION, SOLUTION INTRAVENOUS ONCE
Status: DISCONTINUED | OUTPATIENT
Start: 2025-07-26 | End: 2025-07-26

## 2025-07-26 RX ORDER — NITROGLYCERIN 0.4 MG/1
0.4 TABLET SUBLINGUAL ONCE
Status: DISCONTINUED | OUTPATIENT
Start: 2025-07-26 | End: 2025-07-26

## 2025-07-26 RX ORDER — HYDROMORPHONE HYDROCHLORIDE 1 MG/ML
1 INJECTION, SOLUTION INTRAMUSCULAR; INTRAVENOUS; SUBCUTANEOUS ONCE
Status: COMPLETED | OUTPATIENT
Start: 2025-07-26 | End: 2025-07-26

## 2025-07-26 RX ORDER — MORPHINE SULFATE 4 MG/ML
4 INJECTION, SOLUTION INTRAMUSCULAR; INTRAVENOUS ONCE
Status: COMPLETED | OUTPATIENT
Start: 2025-07-26 | End: 2025-07-26

## 2025-07-26 RX ORDER — OXYCODONE HYDROCHLORIDE 5 MG/1
5 TABLET ORAL ONCE
Refills: 0 | Status: DISCONTINUED | OUTPATIENT
Start: 2025-07-26 | End: 2025-07-26

## 2025-07-26 RX ADMIN — MORPHINE SULFATE 4 MG: 4 INJECTION, SOLUTION INTRAMUSCULAR; INTRAVENOUS at 21:26

## 2025-07-26 RX ADMIN — METHYLPREDNISOLONE SODIUM SUCCINATE 125 MG: 125 INJECTION, POWDER, FOR SOLUTION INTRAMUSCULAR; INTRAVENOUS at 22:36

## 2025-07-26 RX ADMIN — HYDROMORPHONE HYDROCHLORIDE 1 MG: 1 INJECTION, SOLUTION INTRAMUSCULAR; INTRAVENOUS; SUBCUTANEOUS at 22:36

## 2025-07-26 ASSESSMENT — HEART SCORE
AGE: 65+
HEART SCORE: 5
RISK FACTORS: >2 RISK FACTORS OR HX OF ATHEROSCLEROTIC DISEASE
TROPONIN: LESS THAN OR EQUAL TO NORMAL LIMIT
ECG: NON-SPECIFIC REPOLARIZATION DISTURBANCE
HISTORY: SLIGHTLY SUSPICIOUS

## 2025-07-26 ASSESSMENT — PAIN DESCRIPTION - LOCATION
LOCATION: CHEST
LOCATION: CHEST

## 2025-07-26 ASSESSMENT — PAIN DESCRIPTION - PAIN TYPE: TYPE: ACUTE PAIN

## 2025-07-26 ASSESSMENT — PAIN - FUNCTIONAL ASSESSMENT
PAIN_FUNCTIONAL_ASSESSMENT: 0-10
PAIN_FUNCTIONAL_ASSESSMENT: 0-10

## 2025-07-26 ASSESSMENT — PAIN SCALES - GENERAL
PAINLEVEL_OUTOF10: 9
PAINLEVEL_OUTOF10: 8

## 2025-07-26 NOTE — ED TRIAGE NOTES
Pt here for SOB that started this afternoon. Pt reports feeling like he can't catch a good breath

## 2025-07-27 VITALS
HEIGHT: 75 IN | SYSTOLIC BLOOD PRESSURE: 146 MMHG | RESPIRATION RATE: 18 BRPM | BODY MASS INDEX: 39.17 KG/M2 | HEART RATE: 78 BPM | OXYGEN SATURATION: 97 % | TEMPERATURE: 97.7 F | DIASTOLIC BLOOD PRESSURE: 67 MMHG | WEIGHT: 315 LBS

## 2025-07-27 PROBLEM — R07.9 CHEST PAIN: Status: ACTIVE | Noted: 2025-07-27

## 2025-07-27 LAB
ANION GAP SERPL CALCULATED.3IONS-SCNC: 13 MMOL/L (ref 10–20)
BUN SERPL-MCNC: 50 MG/DL (ref 6–23)
CALCIUM SERPL-MCNC: 8.7 MG/DL (ref 8.6–10.3)
CHLORIDE SERPL-SCNC: 98 MMOL/L (ref 98–107)
CO2 SERPL-SCNC: 29 MMOL/L (ref 21–32)
CREAT SERPL-MCNC: 1.96 MG/DL (ref 0.5–1.3)
EGFRCR SERPLBLD CKD-EPI 2021: 36 ML/MIN/1.73M*2
ERYTHROCYTE [DISTWIDTH] IN BLOOD BY AUTOMATED COUNT: 16.6 % (ref 11.5–14.5)
GLUCOSE SERPL-MCNC: 192 MG/DL (ref 74–99)
HCT VFR BLD AUTO: 46.3 % (ref 41–52)
HGB BLD-MCNC: 14.8 G/DL (ref 13.5–17.5)
MCH RBC QN AUTO: 27.4 PG (ref 26–34)
MCHC RBC AUTO-ENTMCNC: 32 G/DL (ref 32–36)
MCV RBC AUTO: 86 FL (ref 80–100)
NRBC BLD-RTO: 0 /100 WBCS (ref 0–0)
PLATELET # BLD AUTO: 192 X10*3/UL (ref 150–450)
POTASSIUM SERPL-SCNC: 4.6 MMOL/L (ref 3.5–5.3)
RBC # BLD AUTO: 5.41 X10*6/UL (ref 4.5–5.9)
SODIUM SERPL-SCNC: 135 MMOL/L (ref 136–145)
WBC # BLD AUTO: 6.4 X10*3/UL (ref 4.4–11.3)

## 2025-07-27 PROCEDURE — 9420000001 HC RT PATIENT EDUCATION 5 MIN

## 2025-07-27 PROCEDURE — 99233 SBSQ HOSP IP/OBS HIGH 50: CPT | Performed by: INTERNAL MEDICINE

## 2025-07-27 PROCEDURE — 97116 GAIT TRAINING THERAPY: CPT | Mod: GP

## 2025-07-27 PROCEDURE — 2500000004 HC RX 250 GENERAL PHARMACY W/ HCPCS (ALT 636 FOR OP/ED): Performed by: INTERNAL MEDICINE

## 2025-07-27 PROCEDURE — 80048 BASIC METABOLIC PNL TOTAL CA: CPT | Performed by: INTERNAL MEDICINE

## 2025-07-27 PROCEDURE — 94664 DEMO&/EVAL PT USE INHALER: CPT | Mod: 59

## 2025-07-27 PROCEDURE — 96376 TX/PRO/DX INJ SAME DRUG ADON: CPT | Mod: 59

## 2025-07-27 PROCEDURE — G0378 HOSPITAL OBSERVATION PER HR: HCPCS

## 2025-07-27 PROCEDURE — 94640 AIRWAY INHALATION TREATMENT: CPT

## 2025-07-27 PROCEDURE — 2500000001 HC RX 250 WO HCPCS SELF ADMINISTERED DRUGS (ALT 637 FOR MEDICARE OP): Performed by: INTERNAL MEDICINE

## 2025-07-27 PROCEDURE — 36415 COLL VENOUS BLD VENIPUNCTURE: CPT | Performed by: INTERNAL MEDICINE

## 2025-07-27 PROCEDURE — 2500000002 HC RX 250 W HCPCS SELF ADMINISTERED DRUGS (ALT 637 FOR MEDICARE OP, ALT 636 FOR OP/ED): Performed by: INTERNAL MEDICINE

## 2025-07-27 PROCEDURE — 99223 1ST HOSP IP/OBS HIGH 75: CPT | Performed by: INTERNAL MEDICINE

## 2025-07-27 PROCEDURE — 2500000004 HC RX 250 GENERAL PHARMACY W/ HCPCS (ALT 636 FOR OP/ED): Mod: JW | Performed by: INTERNAL MEDICINE

## 2025-07-27 PROCEDURE — 99222 1ST HOSP IP/OBS MODERATE 55: CPT

## 2025-07-27 PROCEDURE — 97161 PT EVAL LOW COMPLEX 20 MIN: CPT | Mod: GP

## 2025-07-27 PROCEDURE — 85027 COMPLETE CBC AUTOMATED: CPT | Performed by: INTERNAL MEDICINE

## 2025-07-27 RX ORDER — HYDROMORPHONE HYDROCHLORIDE 1 MG/ML
1 INJECTION, SOLUTION INTRAMUSCULAR; INTRAVENOUS; SUBCUTANEOUS EVERY 4 HOURS PRN
Status: DISCONTINUED | OUTPATIENT
Start: 2025-07-27 | End: 2025-07-28

## 2025-07-27 RX ORDER — POLYETHYLENE GLYCOL 3350 17 G/17G
17 POWDER, FOR SOLUTION ORAL DAILY PRN
Status: DISCONTINUED | OUTPATIENT
Start: 2025-07-27 | End: 2025-07-29 | Stop reason: HOSPADM

## 2025-07-27 RX ORDER — MORPHINE SULFATE 2 MG/ML
2 INJECTION, SOLUTION INTRAMUSCULAR; INTRAVENOUS EVERY 4 HOURS PRN
Status: DISCONTINUED | OUTPATIENT
Start: 2025-07-27 | End: 2025-07-27

## 2025-07-27 RX ORDER — IPRATROPIUM BROMIDE AND ALBUTEROL SULFATE 2.5; .5 MG/3ML; MG/3ML
3 SOLUTION RESPIRATORY (INHALATION) 4 TIMES DAILY PRN
Status: DISCONTINUED | OUTPATIENT
Start: 2025-07-27 | End: 2025-07-29

## 2025-07-27 RX ORDER — MORPHINE SULFATE 4 MG/ML
4 INJECTION, SOLUTION INTRAMUSCULAR; INTRAVENOUS EVERY 4 HOURS PRN
Status: DISCONTINUED | OUTPATIENT
Start: 2025-07-27 | End: 2025-07-27

## 2025-07-27 RX ORDER — ONDANSETRON 4 MG/1
4 TABLET, FILM COATED ORAL EVERY 8 HOURS PRN
Status: DISCONTINUED | OUTPATIENT
Start: 2025-07-27 | End: 2025-07-29 | Stop reason: HOSPADM

## 2025-07-27 RX ORDER — ATORVASTATIN CALCIUM 40 MG/1
40 TABLET, FILM COATED ORAL NIGHTLY
Status: DISCONTINUED | OUTPATIENT
Start: 2025-07-27 | End: 2025-07-29 | Stop reason: HOSPADM

## 2025-07-27 RX ORDER — FORMOTEROL FUMARATE 20 UG/2ML
20 SOLUTION RESPIRATORY (INHALATION)
Status: DISCONTINUED | OUTPATIENT
Start: 2025-07-27 | End: 2025-07-28

## 2025-07-27 RX ORDER — BUDESONIDE 0.5 MG/2ML
0.5 INHALANT ORAL
Status: DISCONTINUED | OUTPATIENT
Start: 2025-07-27 | End: 2025-07-28

## 2025-07-27 RX ORDER — ONDANSETRON HYDROCHLORIDE 2 MG/ML
4 INJECTION, SOLUTION INTRAVENOUS EVERY 8 HOURS PRN
Status: DISCONTINUED | OUTPATIENT
Start: 2025-07-27 | End: 2025-07-29 | Stop reason: HOSPADM

## 2025-07-27 RX ORDER — LOSARTAN POTASSIUM 50 MG/1
50 TABLET ORAL DAILY
Status: DISCONTINUED | OUTPATIENT
Start: 2025-07-27 | End: 2025-07-29 | Stop reason: HOSPADM

## 2025-07-27 RX ORDER — TORSEMIDE 100 MG/1
50 TABLET ORAL DAILY
Status: DISCONTINUED | OUTPATIENT
Start: 2025-07-27 | End: 2025-07-29 | Stop reason: HOSPADM

## 2025-07-27 RX ORDER — ACETAMINOPHEN 650 MG/1
650 SUPPOSITORY RECTAL EVERY 4 HOURS PRN
Status: DISCONTINUED | OUTPATIENT
Start: 2025-07-27 | End: 2025-07-29 | Stop reason: HOSPADM

## 2025-07-27 RX ORDER — CARVEDILOL 3.12 MG/1
3.12 TABLET ORAL 2 TIMES DAILY
Status: DISCONTINUED | OUTPATIENT
Start: 2025-07-27 | End: 2025-07-29 | Stop reason: HOSPADM

## 2025-07-27 RX ORDER — MORPHINE SULFATE 4 MG/ML
4 INJECTION, SOLUTION INTRAMUSCULAR; INTRAVENOUS ONCE
Status: COMPLETED | OUTPATIENT
Start: 2025-07-27 | End: 2025-07-27

## 2025-07-27 RX ORDER — SPIRONOLACTONE 50 MG/1
50 TABLET, FILM COATED ORAL DAILY
Status: DISCONTINUED | OUTPATIENT
Start: 2025-07-27 | End: 2025-07-29 | Stop reason: HOSPADM

## 2025-07-27 RX ORDER — ALLOPURINOL 300 MG/1
300 TABLET ORAL DAILY
Status: DISCONTINUED | OUTPATIENT
Start: 2025-07-27 | End: 2025-07-29 | Stop reason: HOSPADM

## 2025-07-27 RX ORDER — POTASSIUM CHLORIDE 750 MG/1
10 TABLET, FILM COATED, EXTENDED RELEASE ORAL NIGHTLY
Status: DISCONTINUED | OUTPATIENT
Start: 2025-07-27 | End: 2025-07-29 | Stop reason: HOSPADM

## 2025-07-27 RX ORDER — ACETAMINOPHEN 325 MG/1
650 TABLET ORAL EVERY 4 HOURS PRN
Status: DISCONTINUED | OUTPATIENT
Start: 2025-07-27 | End: 2025-07-29 | Stop reason: HOSPADM

## 2025-07-27 RX ORDER — TRAZODONE HYDROCHLORIDE 100 MG/1
100 TABLET ORAL NIGHTLY
Status: DISCONTINUED | OUTPATIENT
Start: 2025-07-27 | End: 2025-07-29 | Stop reason: HOSPADM

## 2025-07-27 RX ORDER — ACETAMINOPHEN 160 MG/5ML
650 SOLUTION ORAL EVERY 4 HOURS PRN
Status: DISCONTINUED | OUTPATIENT
Start: 2025-07-27 | End: 2025-07-29 | Stop reason: HOSPADM

## 2025-07-27 RX ORDER — BUPROPION HYDROCHLORIDE 150 MG/1
150 TABLET ORAL DAILY
Status: DISCONTINUED | OUTPATIENT
Start: 2025-07-27 | End: 2025-07-29 | Stop reason: HOSPADM

## 2025-07-27 RX ADMIN — FORMOTEROL FUMARATE 20 MCG: 20 SOLUTION RESPIRATORY (INHALATION) at 07:27

## 2025-07-27 RX ADMIN — TORSEMIDE 50 MG: 100 TABLET ORAL at 08:10

## 2025-07-27 RX ADMIN — CARVEDILOL 3.12 MG: 3.12 TABLET, FILM COATED ORAL at 21:44

## 2025-07-27 RX ADMIN — MORPHINE SULFATE 4 MG: 4 INJECTION, SOLUTION INTRAMUSCULAR; INTRAVENOUS at 01:30

## 2025-07-27 RX ADMIN — APIXABAN 5 MG: 5 TABLET, FILM COATED ORAL at 21:43

## 2025-07-27 RX ADMIN — HYDROMORPHONE HYDROCHLORIDE 1 MG: 1 INJECTION, SOLUTION INTRAMUSCULAR; INTRAVENOUS; SUBCUTANEOUS at 23:13

## 2025-07-27 RX ADMIN — METHYLPREDNISOLONE SODIUM SUCCINATE 20 MG: 40 INJECTION, POWDER, FOR SOLUTION INTRAMUSCULAR; INTRAVENOUS at 13:02

## 2025-07-27 RX ADMIN — HYDROMORPHONE HYDROCHLORIDE 1 MG: 1 INJECTION, SOLUTION INTRAMUSCULAR; INTRAVENOUS; SUBCUTANEOUS at 15:15

## 2025-07-27 RX ADMIN — METHYLPREDNISOLONE SODIUM SUCCINATE 60 MG: 125 INJECTION, POWDER, FOR SOLUTION INTRAMUSCULAR; INTRAVENOUS at 01:29

## 2025-07-27 RX ADMIN — CARVEDILOL 3.12 MG: 3.12 TABLET, FILM COATED ORAL at 08:10

## 2025-07-27 RX ADMIN — ATORVASTATIN CALCIUM 40 MG: 40 TABLET, FILM COATED ORAL at 21:43

## 2025-07-27 RX ADMIN — FORMOTEROL FUMARATE 20 MCG: 20 SOLUTION RESPIRATORY (INHALATION) at 17:17

## 2025-07-27 RX ADMIN — HYDROMORPHONE HYDROCHLORIDE 1 MG: 1 INJECTION, SOLUTION INTRAMUSCULAR; INTRAVENOUS; SUBCUTANEOUS at 10:39

## 2025-07-27 RX ADMIN — HYDROMORPHONE HYDROCHLORIDE 1 MG: 1 INJECTION, SOLUTION INTRAMUSCULAR; INTRAVENOUS; SUBCUTANEOUS at 06:33

## 2025-07-27 RX ADMIN — BUPROPION HYDROCHLORIDE 150 MG: 150 TABLET, EXTENDED RELEASE ORAL at 08:10

## 2025-07-27 RX ADMIN — LEVETIRACETAM 750 MG: 250 TABLET, FILM COATED ORAL at 21:43

## 2025-07-27 RX ADMIN — BUDESONIDE 0.5 MG: 0.5 INHALANT RESPIRATORY (INHALATION) at 07:27

## 2025-07-27 RX ADMIN — APIXABAN 5 MG: 5 TABLET, FILM COATED ORAL at 08:10

## 2025-07-27 RX ADMIN — LEVETIRACETAM 750 MG: 250 TABLET, FILM COATED ORAL at 08:10

## 2025-07-27 RX ADMIN — ALLOPURINOL 300 MG: 300 TABLET ORAL at 08:10

## 2025-07-27 RX ADMIN — BUDESONIDE 0.5 MG: 0.5 INHALANT RESPIRATORY (INHALATION) at 17:17

## 2025-07-27 RX ADMIN — TRAZODONE HYDROCHLORIDE 100 MG: 100 TABLET ORAL at 21:43

## 2025-07-27 RX ADMIN — HYDROMORPHONE HYDROCHLORIDE 1 MG: 1 INJECTION, SOLUTION INTRAMUSCULAR; INTRAVENOUS; SUBCUTANEOUS at 02:30

## 2025-07-27 RX ADMIN — HYDROMORPHONE HYDROCHLORIDE 1 MG: 1 INJECTION, SOLUTION INTRAMUSCULAR; INTRAVENOUS; SUBCUTANEOUS at 19:29

## 2025-07-27 SDOH — ECONOMIC STABILITY: HOUSING INSECURITY: IN THE PAST 12 MONTHS, HOW MANY TIMES HAVE YOU MOVED WHERE YOU WERE LIVING?: 0

## 2025-07-27 SDOH — SOCIAL STABILITY: SOCIAL NETWORK: HOW OFTEN DO YOU ATTEND CHURCH OR RELIGIOUS SERVICES?: NEVER

## 2025-07-27 SDOH — HEALTH STABILITY: MENTAL HEALTH: HOW MANY DRINKS CONTAINING ALCOHOL DO YOU HAVE ON A TYPICAL DAY WHEN YOU ARE DRINKING?: PATIENT DOES NOT DRINK

## 2025-07-27 SDOH — ECONOMIC STABILITY: FOOD INSECURITY: WITHIN THE PAST 12 MONTHS, THE FOOD YOU BOUGHT JUST DIDN'T LAST AND YOU DIDN'T HAVE MONEY TO GET MORE.: NEVER TRUE

## 2025-07-27 SDOH — ECONOMIC STABILITY: INCOME INSECURITY: IN THE PAST 12 MONTHS HAS THE ELECTRIC, GAS, OIL, OR WATER COMPANY THREATENED TO SHUT OFF SERVICES IN YOUR HOME?: NO

## 2025-07-27 SDOH — HEALTH STABILITY: PHYSICAL HEALTH: ON AVERAGE, HOW MANY DAYS PER WEEK DO YOU ENGAGE IN MODERATE TO STRENUOUS EXERCISE (LIKE A BRISK WALK)?: 0 DAYS

## 2025-07-27 SDOH — SOCIAL STABILITY: SOCIAL INSECURITY: ARE YOU OR HAVE YOU BEEN THREATENED OR ABUSED PHYSICALLY, EMOTIONALLY, OR SEXUALLY BY ANYONE?: NO

## 2025-07-27 SDOH — HEALTH STABILITY: MENTAL HEALTH
DO YOU FEEL STRESS - TENSE, RESTLESS, NERVOUS, OR ANXIOUS, OR UNABLE TO SLEEP AT NIGHT BECAUSE YOUR MIND IS TROUBLED ALL THE TIME - THESE DAYS?: ONLY A LITTLE

## 2025-07-27 SDOH — HEALTH STABILITY: PHYSICAL HEALTH: ON AVERAGE, HOW MANY MINUTES DO YOU ENGAGE IN EXERCISE AT THIS LEVEL?: 0 MIN

## 2025-07-27 SDOH — SOCIAL STABILITY: SOCIAL INSECURITY: WITHIN THE LAST YEAR, HAVE YOU BEEN HUMILIATED OR EMOTIONALLY ABUSED IN OTHER WAYS BY YOUR PARTNER OR EX-PARTNER?: NO

## 2025-07-27 SDOH — SOCIAL STABILITY: SOCIAL INSECURITY: ARE YOU MARRIED, WIDOWED, DIVORCED, SEPARATED, NEVER MARRIED, OR LIVING WITH A PARTNER?: MARRIED

## 2025-07-27 SDOH — SOCIAL STABILITY: SOCIAL INSECURITY: WERE YOU ABLE TO COMPLETE ALL THE BEHAVIORAL HEALTH SCREENINGS?: YES

## 2025-07-27 SDOH — SOCIAL STABILITY: SOCIAL INSECURITY: DOES ANYONE TRY TO KEEP YOU FROM HAVING/CONTACTING OTHER FRIENDS OR DOING THINGS OUTSIDE YOUR HOME?: NO

## 2025-07-27 SDOH — SOCIAL STABILITY: SOCIAL INSECURITY: ABUSE: ADULT

## 2025-07-27 SDOH — ECONOMIC STABILITY: FOOD INSECURITY: HOW HARD IS IT FOR YOU TO PAY FOR THE VERY BASICS LIKE FOOD, HOUSING, MEDICAL CARE, AND HEATING?: VERY HARD

## 2025-07-27 SDOH — SOCIAL STABILITY: SOCIAL INSECURITY: WITHIN THE LAST YEAR, HAVE YOU BEEN AFRAID OF YOUR PARTNER OR EX-PARTNER?: NO

## 2025-07-27 SDOH — SOCIAL STABILITY: SOCIAL INSECURITY: HAS ANYONE EVER THREATENED TO HURT YOUR FAMILY OR YOUR PETS?: NO

## 2025-07-27 SDOH — ECONOMIC STABILITY: HOUSING INSECURITY: AT ANY TIME IN THE PAST 12 MONTHS, WERE YOU HOMELESS OR LIVING IN A SHELTER (INCLUDING NOW)?: NO

## 2025-07-27 SDOH — SOCIAL STABILITY: SOCIAL INSECURITY: DO YOU FEEL UNSAFE GOING BACK TO THE PLACE WHERE YOU ARE LIVING?: NO

## 2025-07-27 SDOH — SOCIAL STABILITY: SOCIAL NETWORK: HOW OFTEN DO YOU GET TOGETHER WITH FRIENDS OR RELATIVES?: TWICE A WEEK

## 2025-07-27 SDOH — ECONOMIC STABILITY: HOUSING INSECURITY: IN THE LAST 12 MONTHS, WAS THERE A TIME WHEN YOU WERE NOT ABLE TO PAY THE MORTGAGE OR RENT ON TIME?: NO

## 2025-07-27 SDOH — ECONOMIC STABILITY: FOOD INSECURITY: WITHIN THE PAST 12 MONTHS, YOU WORRIED THAT YOUR FOOD WOULD RUN OUT BEFORE YOU GOT THE MONEY TO BUY MORE.: NEVER TRUE

## 2025-07-27 SDOH — ECONOMIC STABILITY: TRANSPORTATION INSECURITY: IN THE PAST 12 MONTHS, HAS LACK OF TRANSPORTATION KEPT YOU FROM MEDICAL APPOINTMENTS OR FROM GETTING MEDICATIONS?: NO

## 2025-07-27 SDOH — ECONOMIC STABILITY: FOOD INSECURITY: HOW HARD IS IT FOR YOU TO PAY FOR THE VERY BASICS LIKE FOOD, HOUSING, MEDICAL CARE, AND HEATING?: NOT VERY HARD

## 2025-07-27 SDOH — HEALTH STABILITY: MENTAL HEALTH: HOW OFTEN DO YOU HAVE A DRINK CONTAINING ALCOHOL?: NEVER

## 2025-07-27 SDOH — HEALTH STABILITY: MENTAL HEALTH: HOW OFTEN DO YOU HAVE SIX OR MORE DRINKS ON ONE OCCASION?: NEVER

## 2025-07-27 SDOH — SOCIAL STABILITY: SOCIAL NETWORK: HOW OFTEN DO YOU ATTEND MEETINGS OF THE CLUBS OR ORGANIZATIONS YOU BELONG TO?: NEVER

## 2025-07-27 SDOH — SOCIAL STABILITY: SOCIAL INSECURITY: ARE THERE ANY APPARENT SIGNS OF INJURIES/BEHAVIORS THAT COULD BE RELATED TO ABUSE/NEGLECT?: NO

## 2025-07-27 SDOH — SOCIAL STABILITY: SOCIAL INSECURITY: DO YOU FEEL ANYONE HAS EXPLOITED OR TAKEN ADVANTAGE OF YOU FINANCIALLY OR OF YOUR PERSONAL PROPERTY?: NO

## 2025-07-27 SDOH — SOCIAL STABILITY: SOCIAL INSECURITY: HAVE YOU HAD THOUGHTS OF HARMING ANYONE ELSE?: NO

## 2025-07-27 ASSESSMENT — COGNITIVE AND FUNCTIONAL STATUS - GENERAL
MOVING TO AND FROM BED TO CHAIR: A LITTLE
MOBILITY SCORE: 24
DAILY ACTIVITIY SCORE: 24
DAILY ACTIVITIY SCORE: 24
MOBILITY SCORE: 24
PATIENT BASELINE BEDBOUND: NO
MOBILITY SCORE: 24
CLIMB 3 TO 5 STEPS WITH RAILING: A LOT
DAILY ACTIVITIY SCORE: 24
STANDING UP FROM CHAIR USING ARMS: A LITTLE
WALKING IN HOSPITAL ROOM: A LITTLE
MOBILITY SCORE: 19
MOBILITY SCORE: 24
DAILY ACTIVITIY SCORE: 24

## 2025-07-27 ASSESSMENT — ACTIVITIES OF DAILY LIVING (ADL)
ADL_ASSISTANCE: INDEPENDENT
WALKS IN HOME: INDEPENDENT
ADEQUATE_TO_COMPLETE_ADL: YES
LACK_OF_TRANSPORTATION: NO
HEARING - RIGHT EAR: FUNCTIONAL
HEARING - LEFT EAR: FUNCTIONAL
GROOMING: INDEPENDENT
DRESSING YOURSELF: INDEPENDENT
BATHING: INDEPENDENT
JUDGMENT_ADEQUATE_SAFELY_COMPLETE_DAILY_ACTIVITIES: YES
LACK_OF_TRANSPORTATION: NO
FEEDING YOURSELF: INDEPENDENT
PATIENT'S MEMORY ADEQUATE TO SAFELY COMPLETE DAILY ACTIVITIES?: YES
TOILETING: INDEPENDENT

## 2025-07-27 ASSESSMENT — PAIN SCALES - GENERAL
PAINLEVEL_OUTOF10: 8
PAINLEVEL_OUTOF10: 7
PAINLEVEL_OUTOF10: 9
PAINLEVEL_OUTOF10: 2
PAINLEVEL_OUTOF10: 9
PAINLEVEL_OUTOF10: 8
PAINLEVEL_OUTOF10: 2
PAINLEVEL_OUTOF10: 8
PAINLEVEL_OUTOF10: 6
PAINLEVEL_OUTOF10: 3
PAINLEVEL_OUTOF10: 2
PAINLEVEL_OUTOF10: 4

## 2025-07-27 ASSESSMENT — ENCOUNTER SYMPTOMS
NEUROLOGICAL NEGATIVE: 1
SHORTNESS OF BREATH: 1
CONSTITUTIONAL NEGATIVE: 1
PALPITATIONS: 0
ALLERGIC/IMMUNOLOGIC NEGATIVE: 1
HEMATOLOGIC/LYMPHATIC NEGATIVE: 1
MUSCULOSKELETAL NEGATIVE: 1
CHEST TIGHTNESS: 0
ENDOCRINE NEGATIVE: 1
WHEEZING: 1
PSYCHIATRIC NEGATIVE: 1
EYES NEGATIVE: 1

## 2025-07-27 ASSESSMENT — PAIN DESCRIPTION - DESCRIPTORS
DESCRIPTORS: ACHING
DESCRIPTORS: SHARP
DESCRIPTORS: SHARP;SHOOTING
DESCRIPTORS: SHARP
DESCRIPTORS: SHARP;SHOOTING
DESCRIPTORS: ACHING

## 2025-07-27 ASSESSMENT — LIFESTYLE VARIABLES
AUDIT-C TOTAL SCORE: 0
HOW OFTEN DO YOU HAVE A DRINK CONTAINING ALCOHOL: NEVER
HOW MANY STANDARD DRINKS CONTAINING ALCOHOL DO YOU HAVE ON A TYPICAL DAY: PATIENT DOES NOT DRINK
HOW OFTEN DO YOU HAVE 6 OR MORE DRINKS ON ONE OCCASION: NEVER
SKIP TO QUESTIONS 9-10: 1
AUDIT-C TOTAL SCORE: 0
SKIP TO QUESTIONS 9-10: 1
AUDIT-C TOTAL SCORE: 0

## 2025-07-27 ASSESSMENT — PAIN DESCRIPTION - LOCATION
LOCATION: CHEST

## 2025-07-27 ASSESSMENT — PAIN DESCRIPTION - ORIENTATION
ORIENTATION: MID

## 2025-07-27 ASSESSMENT — PATIENT HEALTH QUESTIONNAIRE - PHQ9
1. LITTLE INTEREST OR PLEASURE IN DOING THINGS: NOT AT ALL
SUM OF ALL RESPONSES TO PHQ9 QUESTIONS 1 & 2: 0
2. FEELING DOWN, DEPRESSED OR HOPELESS: NOT AT ALL

## 2025-07-27 NOTE — PROGRESS NOTES
07/27/25 1827   Discharge Planning   Living Arrangements Spouse/significant other   Support Systems Spouse/significant other   Assistance Needed home PT   Type of Residence Private residence   Number of Stairs to Enter Residence 3   Number of Stairs Within Residence 1  (flight to upper level)   Do you have animals or pets at home? No   Who is requesting discharge planning? Provider   Home or Post Acute Services In home services   Type of Home Care Services Home nursing visits;Home PT   Expected Discharge Disposition Home H   Does the patient need discharge transport arranged? No   Patient Choice   Provider Choice list and CMS website (https://medicare.gov/care-compare#search) for post-acute Quality and Resource Measure Data were provided and reviewed with: Patient   Patient / Family choosing to utilize agency / facility established prior to hospitalization No   Stroke Family Assessment   Stroke Family Assessment Needed No

## 2025-07-27 NOTE — PROGRESS NOTES
07/27/25 1823   Physical Activity   On average, how many days per week do you engage in moderate to strenuous exercise (like a brisk walk)? 0 days   On average, how many minutes do you engage in exercise at this level? 0 min   Financial Resource Strain   How hard is it for you to pay for the very basics like food, housing, medical care, and heating? Not very   Housing Stability   In the last 12 months, was there a time when you were not able to pay the mortgage or rent on time? N   In the past 12 months, how many times have you moved where you were living? 0   At any time in the past 12 months, were you homeless or living in a shelter (including now)? N   Transportation Needs   In the past 12 months, has lack of transportation kept you from medical appointments or from getting medications? no   In the past 12 months, has lack of transportation kept you from meetings, work, or from getting things needed for daily living? No   Food Insecurity   Within the past 12 months, you worried that your food would run out before you got the money to buy more. Never true   Within the past 12 months, the food you bought just didn't last and you didn't have money to get more. Never true   Stress   Do you feel stress - tense, restless, nervous, or anxious, or unable to sleep at night because your mind is troubled all the time - these days? Only a littl   Social Connections   In a typical week, how many times do you talk on the phone with family, friends, or neighbors? More than 3   How often do you get together with friends or relatives? Twice   How often do you attend Rastafarian or Alevism services? Never   Do you belong to any clubs or organizations such as Rastafarian groups, unions, fraternal or athletic groups, or school groups? No   How often do you attend meetings of the clubs or organizations you belong to? Never   Are you , , , , never , or living with a partner?    Intimate Partner  Violence   Within the last year, have you been afraid of your partner or ex-partner? No   Within the last year, have you been humiliated or emotionally abused in other ways by your partner or ex-partner? No   Within the last year, have you been kicked, hit, slapped, or otherwise physically hurt by your partner or ex-partner? No   Within the last year, have you been raped or forced to have any kind of sexual activity by your partner or ex-partner? No   Alcohol Use   Q1: How often do you have a drink containing alcohol? Never   Q2: How many drinks containing alcohol do you have on a typical day when you are drinking? None   Q3: How often do you have six or more drinks on one occasion? Never   Utilities   In the past 12 months has the electric, gas, oil, or water company threatened to shut off services in your home? No   Health Literacy   How often do you need to have someone help you when you read instructions, pamphlets, or other written material from your doctor or pharmacy? Never   Follow-Ups   We make community resources available to all of our patients to assist with everyday needs. We may be able to connect you with those resources. Would you be interested? N

## 2025-07-27 NOTE — ED PROVIDER NOTES
HPI   Chief Complaint   Patient presents with    Shortness of Breath       HPI  Patient is a 70-year-old male who presents to ED for chief complaint of shortness of breath and chest pain which started earlier today.  Patient states he felt fine when he woke up, began to experience nonradiating mid chest pain that is worse with breathing.  He reports productive cough.  Denies any nausea or vomiting or diaphoresis.  Denies any fever or chills.  Denies any urinary symptoms.  Denies any recent travel or sick contacts.  Denies any recent surgeries or hospitalizations.  He is on Eliquis, has not doses.  He has a history of DVT, CVA, RASHID, A-fib, CKD, CAD, HFpEF, chronic respiratory failure on 3 L nasal cannula at baseline.      Patient History   Medical History[1]  Surgical History[2]  Family History[3]  Social History[4]    Physical Exam   ED Triage Vitals   Temp Heart Rate Resp BP   07/26/25 1907 07/26/25 1907 07/26/25 1907 07/26/25 1907   35.6 °C (96.1 °F) 81 20 95/66      SpO2 Temp Source Heart Rate Source Patient Position   07/26/25 1907 07/26/25 1907 07/27/25 0113 07/27/25 0113   97 % Temporal Brachial Lying      BP Location FiO2 (%)     07/27/25 0113 --     Left arm        Physical Exam  Vitals reviewed.   Constitutional:       General: He is not in acute distress.     Appearance: Normal appearance. He is not ill-appearing, toxic-appearing or diaphoretic.   HENT:      Head: Normocephalic and atraumatic.     Eyes:      Extraocular Movements: Extraocular movements intact.       Cardiovascular:      Rate and Rhythm: Normal rate and regular rhythm.      Heart sounds: Normal heart sounds.   Pulmonary:      Effort: Pulmonary effort is normal.      Breath sounds: Normal breath sounds.   Abdominal:      General: Abdomen is flat. There is no distension.      Palpations: Abdomen is soft.      Tenderness: There is no abdominal tenderness.     Musculoskeletal:         General: Normal range of motion.      Cervical back: Normal  range of motion and neck supple.     Skin:     General: Skin is warm and dry.     Neurological:      General: No focal deficit present.      Mental Status: He is alert and oriented to person, place, and time.     Psychiatric:         Mood and Affect: Mood normal.         Behavior: Behavior normal.           ED Course & MDM   ED Course as of 07/27/25 0119   Sat Jul 26, 2025 1924 EKG on my interpretation shows sinus rhythm with first-degree block, rate is 76 beats minute.  Left axis deviation.  There is left bundle branch block present.  QTc is 528 ms, ME interval 222.  There is no ST elevation or depression, no acute ischemic pattern.  No STEMI.  T wave inversions present in 1 and aVL, unchanged compared to prior EKGs. [NT]   2137 I did review the chest x-ray image and do not see any infiltrates concerning for pneumonia, pneumothorax or other acute process in the chest.  [NT]      ED Course User Index  [NT] Gary Ron DO         Diagnoses as of 07/27/25 0119   Acute chest pain   Shortness of breath                 No data recorded       HEART Score: 5 (07/26/25 2139 : Gary Ron DO)                         Medical Decision Making  Parts of this chart have been completed using voice recognition software. Please excuse any errors of transcription.  My thought process and reason for plan has been formulated from the time that I saw the patient until the time of disposition and is not specific to one specific moment during their visit and furthermore my MDM encompasses this entire chart and not only this text box.    HPI:   A medically appropriate HPI was obtained, outlined above.    Kapil MICHAEL Child is a  70 y.o. male    Chief Complaint   Patient presents with    Shortness of Breath       Medical History[5]    Surgical History[6]    Social History[7]    Family History[8]    Allergies[9]    Current Outpatient Medications   Medication Instructions    albuterol sulfate (Proair Digihaler) 90  "mcg/actuation aero powdr breath act w/sensor inhaler 2 puffs, inhalation, Every 4 hours PRN    allopurinol (ZYLOPRIM) 300 mg, oral, Daily    apixaban (ELIQUIS) 5 mg, oral, 2 times daily    aspirin 81 mg, Daily    atorvastatin (LIPITOR) 40 mg, oral, Nightly    buPROPion XL (WELLBUTRIN XL) 150 mg, oral, Daily, Do not crush, chew, or split.    carvedilol (COREG) 3.125 mg, oral, 2 times daily    empagliflozin (JARDIANCE) 10 mg, oral, Daily    fluticasone propion-salmeteroL (Advair Diskus) 250-50 mcg/dose diskus inhaler 1 puff, inhalation, 2 times daily RT, Rinse mouth with water after use to reduce aftertaste and incidence of candidiasis. Do not swallow.    ipratropium-albuteroL (Duo-Neb) 0.5-2.5 mg/3 mL nebulizer solution 3 mL, nebulization, 4 times daily PRN    levETIRAcetam (KEPPRA) 750 mg, oral, 2 times daily    losartan (COZAAR) 50 mg, oral, Daily    oxygen (O2) gas therapy 1 each, inhalation, Every 12 hours    polyethylene glycol (GLYCOLAX, MIRALAX) 17 g, oral, Daily    potassium chloride CR 10 mEq ER tablet 10 mEq, oral, Nightly, Do not crush, chew, or split.    spironolactone (ALDACTONE) 50 mg, oral, Daily    torsemide (DEMADEX) 50 mg, oral, Daily    traZODone (DESYREL) 100 mg, oral, Nightly   for details    Exam:   Patient Vitals for the past 24 hrs:   BP Temp Temp src Pulse Resp SpO2 Height Weight   07/27/25 1550 134/69 36.4 °C (97.5 °F) Oral 80 18 97 % -- --   07/27/25 1220 134/67 36.6 °C (97.9 °F) Oral 50 18 98 % -- --   07/27/25 0815 -- -- -- 84 -- 94 % -- --   07/27/25 0727 -- -- -- -- -- 97 % -- --   07/27/25 0719 131/76 36.6 °C (97.9 °F) Axillary 86 16 98 % -- --   07/27/25 0113 120/73 36.7 °C (98.1 °F) Oral 75 16 96 % -- --   07/26/25 2100 112/65 -- -- 75 -- 96 % -- --   07/26/25 2030 113/69 -- -- 76 18 -- -- --   07/26/25 1907 95/66 35.6 °C (96.1 °F) Temporal 81 20 97 % 1.905 m (6' 3\") 149 kg (328 lb)       A medically appropriate exam performed, outlined above, given the known history and " presentation.    EKG/Cardiac monitor:   If EKG was done and, it was interpreted by attending physician, see their note for ED course for more detail.    Medications given during visit:  Medications   allopurinol (Zyloprim) tablet 300 mg (300 mg oral Given 7/27/25 0810)   apixaban (Eliquis) tablet 5 mg (5 mg oral Given 7/27/25 0810)   atorvastatin (Lipitor) tablet 40 mg (has no administration in time range)   buPROPion XL (Wellbutrin XL) 24 hr tablet 150 mg (150 mg oral Given 7/27/25 0810)   carvedilol (Coreg) tablet 3.125 mg (3.125 mg oral Given 7/27/25 0810)   empagliflozin (Jardiance) tablet 10 mg ( oral Dose Auto Held 7/31/25 0900)   ipratropium-albuteroL (Duo-Neb) 0.5-2.5 mg/3 mL nebulizer solution 3 mL (has no administration in time range)   budesonide (Pulmicort) 0.5 mg/2 mL nebulizer solution 0.5 mg (0.5 mg nebulization Given 7/27/25 0727)     And   formoterol (Perforomist) 20 mcg/2 mL nebulizer solution 20 mcg (20 mcg nebulization Given 7/27/25 0727)   levETIRAcetam (Keppra) tablet 750 mg (750 mg oral Given 7/27/25 0810)   losartan (Cozaar) tablet 50 mg ( oral Dose Auto Held 7/31/25 0900)   potassium chloride CR (Klor-Con) ER tablet 10 mEq (has no administration in time range)   torsemide (Demadex) tablet 50 mg ( oral Dose Auto Held 7/31/25 0900)   traZODone (Desyrel) tablet 100 mg (has no administration in time range)   spironolactone (Aldactone) tablet 50 mg ( oral Dose Auto Held 7/31/25 0900)   acetaminophen (Tylenol) tablet 650 mg (has no administration in time range)     Or   acetaminophen (Tylenol) oral liquid 650 mg (has no administration in time range)     Or   acetaminophen (Tylenol) suppository 650 mg (has no administration in time range)   ondansetron (Zofran) tablet 4 mg (has no administration in time range)     Or   ondansetron (Zofran) injection 4 mg (has no administration in time range)   polyethylene glycol (Glycolax, Miralax) packet 17 g (has no administration in time range)   HYDROmorphone  (Dilaudid) injection 1 mg (1 mg intravenous Given 7/27/25 1515)   HYDROmorphone (Dilaudid) injection 0.5 mg (has no administration in time range)   methylPREDNISolone sod succinate (SOLU-Medrol) 40 mg/mL injection 20 mg (20 mg intravenous Given 7/27/25 1302)   morphine injection 4 mg (4 mg intravenous Given 7/26/25 2126)   methylPREDNISolone sod succinate (SOLU-Medrol) injection 125 mg (125 mg intravenous Given 7/26/25 2236)   HYDROmorphone (Dilaudid) injection 1 mg (1 mg intravenous Given 7/26/25 2236)   morphine injection 4 mg (4 mg intravenous Given 7/27/25 0130)   methylPREDNISolone sod succinate (SOLU-Medrol) injection 60 mg (60 mg intravenous Given 7/27/25 0129)        Diagnostic/tests:  Labs Reviewed   CBC WITH AUTO DIFFERENTIAL - Abnormal       Result Value    WBC 6.8      nRBC 0.0      RBC 5.15      Hemoglobin 14.1      Hematocrit 45.2      MCV 88      MCH 27.4      MCHC 31.2 (*)     RDW 16.6 (*)     Platelets 194      Neutrophils % 75.2      Immature Granulocytes %, Automated 0.4      Lymphocytes % 9.6      Monocytes % 10.7      Eosinophils % 3.8      Basophils % 0.3      Neutrophils Absolute 5.11      Immature Granulocytes Absolute, Automated 0.03      Lymphocytes Absolute 0.65 (*)     Monocytes Absolute 0.73      Eosinophils Absolute 0.26      Basophils Absolute 0.02     MAGNESIUM - Abnormal    Magnesium 2.49 (*)    COMPREHENSIVE METABOLIC PANEL - Abnormal    Glucose 97      Sodium 135 (*)     Potassium 4.4      Chloride 99      Bicarbonate 29      Anion Gap 11      Urea Nitrogen 49 (*)     Creatinine 2.01 (*)     eGFR 35 (*)     Calcium 8.9      Albumin 3.8      Alkaline Phosphatase 92      Total Protein 6.5      AST 18      Bilirubin, Total 0.6      ALT 14     BLOOD GAS VENOUS FULL PANEL - Abnormal    POCT pH, Venous 7.37      POCT pCO2, Venous 55 (*)     POCT pO2, Venous 49 (*)     POCT SO2, Venous 85 (*)     POCT Oxy Hemoglobin, Venous 82.1 (*)     POCT Hematocrit Calculated, Venous 44.0      POCT  Sodium, Venous 134 (*)     POCT Potassium, Venous 4.6      POCT Chloride, Venous 97 (*)     POCT Ionized Calicum, Venous 1.18      POCT Glucose, Venous 107 (*)     POCT Lactate, Venous 1.3      POCT Base Excess, Venous 4.9 (*)     POCT HCO3 Calculated, Venous 31.8 (*)     POCT Hemoglobin, Venous 14.8      POCT Anion Gap, Venous 10.0      Patient Temperature 37.0      FiO2 96     BASIC METABOLIC PANEL - Abnormal    Glucose 192 (*)     Sodium 135 (*)     Potassium 4.6      Chloride 98      Bicarbonate 29      Anion Gap 13      Urea Nitrogen 50 (*)     Creatinine 1.96 (*)     eGFR 36 (*)     Calcium 8.7     CBC - Abnormal    WBC 6.4      nRBC 0.0      RBC 5.41      Hemoglobin 14.8      Hematocrit 46.3      MCV 86      MCH 27.4      MCHC 32.0      RDW 16.6 (*)     Platelets 192     B-TYPE NATRIURETIC PEPTIDE - Normal    BNP 46      Narrative:        <100 pg/mL - Heart failure unlikely  100-299 pg/mL - Intermediate probability of acute heart                  failure exacerbation. Correlate with clinical                  context and patient history.    >=300 pg/mL - Heart Failure likely. Correlate with clinical                  context and patient history.    BNP testing is performed using different testing methodology at [facility] than at other [facility]. Direct result comparisons should only be made within the same method.      SARS-COV-2 AND INFLUENZA A/B PCR - Normal    Flu A Result Not Detected      Flu B Result Not Detected      Coronavirus 2019, PCR Not Detected      Narrative:     This assay is an FDA-cleared, in vitro diagnostic nucleic acid amplification test for the qualitative detection and differentiation of SARS CoV-2/ Influenza A/B from nasopharyngeal specimens collected from individuals with signs and symptoms of respiratory tract infections, and has been validated for use at [facility]. Negative results do not preclude COVID-19/ Influenza A/B infections and  should not be used as the sole basis for diagnosis, treatment, or other management decisions. Testing for SARS CoV-2 is recommended only for patients who meet current clinical and/or epidemiological criteria defined by federal, state, or local public health directives.   SERIAL TROPONIN-INITIAL - Normal    Troponin I, High Sensitivity 14      Narrative:     Less than 99th percentile of normal range cutoff-  Female and children under 18 years old <14 ng/L; Male <21 ng/L: Negative  Repeat testing should be performed if clinically indicated.     Female and children under 18 years old 14-50 ng/L; Male 21-50 ng/L:  Consistent with possible cardiac damage and possible increased clinical   risk. Serial measurements may help to assess extent of myocardial damage.     >50 ng/L: Consistent with cardiac damage, increased clinical risk and  myocardial infarction. Serial measurements may help assess extent of   myocardial damage.      NOTE: Children less than 1 year old may have higher baseline troponin   levels and results should be interpreted in conjunction with the overall   clinical context.     NOTE: Troponin I testing is performed using a different   testing methodology at [facility] than at other   [facility]. Direct result comparisons should only   be made within the same method.   SERIAL TROPONIN, 1 HOUR - Normal    Troponin I, High Sensitivity 13      Narrative:     Less than 99th percentile of normal range cutoff-  Female and children under 18 years old <14 ng/L; Male <21 ng/L: Negative  Repeat testing should be performed if clinically indicated.     Female and children under 18 years old 14-50 ng/L; Male 21-50 ng/L:  Consistent with possible cardiac damage and possible increased clinical   risk. Serial measurements may help to assess extent of myocardial damage.     >50 ng/L: Consistent with cardiac damage, increased clinical risk and  myocardial infarction. Serial measurements may help assess  extent of   myocardial damage.      NOTE: Children less than 1 year old may have higher baseline troponin   levels and results should be interpreted in conjunction with the overall   clinical context.     NOTE: Troponin I testing is performed using a different   testing methodology at [facility] than at other   [facility] hospitals. Direct result comparisons should only   be made within the same method.   TROPONIN SERIES- (INITIAL, 1 HR)    Narrative:     The following orders were created for panel order Troponin I Series, High Sensitivity (0, 1 HR).  Procedure                               Abnormality         Status                     ---------                               -----------         ------                     Troponin I, High Sensiti...[[id number]]  Normal              Final result               Troponin, High Sensitivi...[[id number]]  Normal              Final result                 Please view results for these tests on the individual orders.        XR chest 1 view   Final Result   No acute cardiopulmonary process.   Signed by Israel Meza MD             [facility] Summary:  No significant lab abnormalities today.  Chest x-ray is without acute findings.  Pain is poorly controlled with Dilaudid and morphine.  Steroids given for respiratory symptoms.  Patient admitted to medicine service for observation due to his cardiac history.      Procedure  Procedures         [1]   Past Medical History:  Diagnosis Date    DVT (deep venous thrombosis) (Multi)     Gout     History of pulmonary embolus (PE)     Hypertension     MI (myocardial infarction) (Multi)     Stroke (Multi)    [2]   Past Surgical History:  Procedure Laterality Date    MR HEAD ANGIO WO IV CONTRAST  7/7/2015    MR HEAD ANGIO WO IV CONTRAST [facility]    MR HEAD ANGIO WO IV CONTRAST  7/9/2015    MR HEAD ANGIO WO IV CONTRAST [facility]   [3] No family history on file.  [4]   Social History  Tobacco Use    Smoking status:  Former     Types: Cigarettes    Smokeless tobacco: Never   Substance Use Topics    Alcohol use: Not Currently    Drug use: Never   [5]   Past Medical History:  Diagnosis Date    Anxiety     Chronic diastolic heart failure     Chronic respiratory failure with hypoxia     Coronary artery disease     Depression     DVT (deep venous thrombosis) (Multi)     Gout     History of pulmonary embolus (PE)     Hypertension     MI (myocardial infarction) (Multi)     Obstructive sleep apnea     Paroxysmal atrial fibrillation (Multi)     Seizure (Multi)     Stroke (Multi)    [6]   Past Surgical History:  Procedure Laterality Date    CHOLECYSTECTOMY      COLECTOMY PARTIAL / TOTAL      partial colectomy    MR HEAD ANGIO WO IV CONTRAST  07/07/2015    MR HEAD ANGIO WO IV CONTRAST [facility]    MR HEAD ANGIO WO IV CONTRAST  07/09/2015    MR HEAD ANGIO WO IV CONTRAST [facility]    TOTAL KNEE ARTHROPLASTY Right    [7]   Social History  Tobacco Use    Smoking status: Former     Types: Cigarettes    Smokeless tobacco: Never   Substance Use Topics    Alcohol use: Not Currently    Drug use: Never   [8]   Family History  Problem Relation Name Age of Onset    Pancreatic cancer Father      Hypertension Father      Hypertension Sister      Hypertension Brother     [9]   Allergies  Allergen Reactions    Cephalosporins Swelling    Codeine Nausea/vomiting    Penicillins Swelling    Tramadol Nausea/vomiting    Adhesive Tape-Silicones Rash        Jimmy Estrella PA-C  07/27/25 7601

## 2025-07-27 NOTE — CARE PLAN
The patient's goals for the shift include      The clinical goals for the shift include      Over the shift, the patient did not make progress toward the following goals. Barriers to progression include . Recommendations to address these barriers include .  Problem: Pain - Adult  Goal: Verbalizes/displays adequate comfort level or baseline comfort level  Outcome: Progressing     Problem: Safety - Adult  Goal: Free from fall injury  Outcome: Progressing

## 2025-07-27 NOTE — PROGRESS NOTES
Kapil MICHAEL Child is a 70 y.o. male on day 0 of admission presenting with Chest pain.    Patient lives with his wife Monica in a two story house. Bedroom and full bath on second level. He mostly uses a cane, sometimes a walker, uses a rollator when out of the house. He is independent with ADLs. Spouse does the driving. He uses [employer] supplied by [facility]. PCP is Yaa Juarez MD.   ADOD 07/28/2025  [facility] scores: PT-19, OT-not yet seen  RNCC offered to set up home care for PT. Patient states he will think about it. Will follow up tomorrow.  Patient does not have a completed discharge plan. Please speak with care coordinator prior to discharging.     Charissa Perez RN
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
## 2025-07-27 NOTE — CONSULTS
Inpatient consult to Cardiology  Consult performed by: PAT Salazar-CNP  Consult ordered by: Angle Hernandez MD  Reason for consult: Chest Pain          Reason For Consult  Chest Pain    History Of Present Illness  Kapil MICHAEL Child is a 70 y.o. male presenting with chest pain and shortness of breath. Patient states that he started feeling chest pain earlier in the day and had worsened throughout the day. At its worst he rated his pain 8/10 that was located in the midsternum. The pain he describes as a sharp ache and a burning that worsens with deep breaths. The pain did not radiate to the neck, jaw, or back.  He was given IV morphine and dilaudid in which relieved his chest pain. At this time he states to still having the chest pain, but only worsening with deep breathing no pain at rest or with shallow breathing.   Past Medical History includes HFpEF, MI 6/2023 s/p PCI of OM1 and OM2, CAD, dyslipidemia, COPD, chronic kidney disease stage III, Seizure disorder, paroxysmal atrial fibrillation -on Eliquis, chronic respiratory failure on home oxygen at 4 L baseline, depression.  Patient had echocardiogram completed in December 2024 for that showed an EF of 55± percent, left ventricular diastolic function was not evaluated due to inconsistent or technically suboptimal data.  Right ventricle is normal in size, right ventricle systolic function is normal, +1 mitral valve regurgitation, aortic sclerosis without significant stenosis   EKG showed Sinus rhythm with 1st degree AV block and a Left bundle branch block, which is a new finding. Labs show a sodium of 135, potassium 4.6, BUN 50, creatinine 1.96, hemoglobin/hematocrit 14.8/46.3, platelets 192.  Chest x-ray obtained was negative, lungs are clear.  There is no evidence of pneumothorax or pleural effusion. Cardiomediastinal silhouette is normal in size and configuration.  In the ED patient was given an IV dose of Dilaudid, 2 IV doses of morphine, and  2 doses of IV Solu-Medrol.  With the patient's increased creatinine his home medications of spironolactone, Jardiance, and losartan were placed on hold.  Admitting team has resumed his home medications of apixaban 5 mg twice daily, atorvastatin 40 mg daily, carvedilol 3.125 mg twice daily, and torsemide 50 mg daily.  Patient follows with cardiologist Dr. Greg Viera at [facility].      Past Medical History  He has a past medical history of Anxiety, Chronic diastolic heart failure, Chronic respiratory failure with hypoxia, Coronary artery disease, Depression, DVT (deep venous thrombosis) (Multi), Gout, History of pulmonary embolus (PE), Hypertension, MI (myocardial infarction) (Multi), Obstructive sleep apnea, Paroxysmal atrial fibrillation (Multi), Seizure (Multi), and Stroke (Multi).    Surgical History  He has a past surgical history that includes MR angio head wo IV contrast (07/07/2015); MR angio head wo IV contrast (07/09/2015); Total knee arthroplasty (Right); Cholecystectomy; and Colectomy partial / total.     Social History  He reports that he has quit smoking. His smoking use included cigarettes. He has never used smokeless tobacco. He reports that he does not currently use alcohol. He reports that he does not use drugs.    Family History  Family History[1]     Allergies  Cephalosporins, Codeine, Penicillins, Tramadol, and Adhesive tape-silicones    Review of Systems   Constitutional: Negative.    HENT: Negative.     Eyes: Negative.    Respiratory:  Positive for shortness of breath and wheezing (expiratory wheezing noted). Negative for chest tightness.    Cardiovascular:  Positive for chest pain and leg swelling (baseline). Negative for palpitations.   Endocrine: Negative.    Genitourinary: Negative.    Musculoskeletal: Negative.    Skin: Negative.    Allergic/Immunologic: Negative.    Neurological: Negative.    Hematological: Negative.    Psychiatric/Behavioral: Negative.         "  Physical Exam  Vitals and nursing note reviewed.   Constitutional:       General: He is not in acute distress.     Appearance: He is obese.   HENT:      Head: Normocephalic and atraumatic.      Nose: Nose normal.      Mouth/Throat:      Mouth: Mucous membranes are moist.      Pharynx: Oropharynx is clear.     Eyes:      Extraocular Movements: Extraocular movements intact.       Cardiovascular:      Rate and Rhythm: Normal rate and regular rhythm.      Heart sounds: Normal heart sounds. No murmur heard.     No friction rub. No gallop.      Comments: Sinus rhythm in the 60s-70s  Pulmonary:      Breath sounds: Wheezing (expiratory wheezing) present.      Comments: Diminshed breath sounds throughout  Abdominal:      Palpations: Abdomen is soft.     Musculoskeletal:         General: Swelling (at baseline Bilateral lower extremities) present.      Cervical back: Normal range of motion and neck supple.     Skin:     General: Skin is warm and dry.      Capillary Refill: Capillary refill takes less than 2 seconds.     Neurological:      Mental Status: He is alert and oriented to person, place, and time.     Psychiatric:         Mood and Affect: Mood normal.         Behavior: Behavior normal.         Thought Content: Thought content normal.         Judgment: Judgment normal.          Last Recorded Vitals  Blood pressure 131/76, pulse 86, temperature 36.6 °C (97.9 °F), temperature source Axillary, resp. rate 16, height 1.905 m (6' 3\"), weight 149 kg (328 lb), SpO2 97%.    Relevant Results  Results for orders placed or performed during the hospital encounter of 07/26/25 (from the past 24 hours)   CBC and Auto Differential   Result Value Ref Range    WBC 6.8 4.4 - 11.3 x10*3/uL    nRBC 0.0 0.0 - 0.0 /100 WBCs    RBC 5.15 4.50 - 5.90 x10*6/uL    Hemoglobin 14.1 13.5 - 17.5 g/dL    Hematocrit 45.2 41.0 - 52.0 %    MCV 88 80 - 100 fL    MCH 27.4 26.0 - 34.0 pg    MCHC 31.2 (L) 32.0 - 36.0 g/dL    RDW 16.6 (H) 11.5 - 14.5 %    " Platelets 194 150 - 450 x10*3/uL    Neutrophils % 75.2 40.0 - 80.0 %    Immature Granulocytes %, Automated 0.4 0.0 - 0.9 %    Lymphocytes % 9.6 13.0 - 44.0 %    Monocytes % 10.7 2.0 - 10.0 %    Eosinophils % 3.8 0.0 - 6.0 %    Basophils % 0.3 0.0 - 2.0 %    Neutrophils Absolute 5.11 1.60 - 5.50 x10*3/uL    Immature Granulocytes Absolute, Automated 0.03 0.00 - 0.50 x10*3/uL    Lymphocytes Absolute 0.65 (L) 0.80 - 3.00 x10*3/uL    Monocytes Absolute 0.73 0.05 - 0.80 x10*3/uL    Eosinophils Absolute 0.26 0.00 - 0.40 x10*3/uL    Basophils Absolute 0.02 0.00 - 0.10 x10*3/uL   Magnesium   Result Value Ref Range    Magnesium 2.49 (H) 1.60 - 2.40 mg/dL   Comprehensive metabolic panel   Result Value Ref Range    Glucose 97 74 - 99 mg/dL    Sodium 135 (L) 136 - 145 mmol/L    Potassium 4.4 3.5 - 5.3 mmol/L    Chloride 99 98 - 107 mmol/L    Bicarbonate 29 21 - 32 mmol/L    Anion Gap 11 10 - 20 mmol/L    Urea Nitrogen 49 (H) 6 - 23 mg/dL    Creatinine 2.01 (H) 0.50 - 1.30 mg/dL    eGFR 35 (L) >60 mL/min/1.73m*2    Calcium 8.9 8.6 - 10.3 mg/dL    Albumin 3.8 3.4 - 5.0 g/dL    Alkaline Phosphatase 92 33 - 136 U/L    Total Protein 6.5 6.4 - 8.2 g/dL    AST 18 9 - 39 U/L    Bilirubin, Total 0.6 0.0 - 1.2 mg/dL    ALT 14 10 - 52 U/L   B-Type Natriuretic Peptide   Result Value Ref Range    BNP 46 0 - 99 pg/mL   Blood Gas Venous Full Panel   Result Value Ref Range    POCT pH, Venous 7.37 7.33 - 7.43 pH    POCT pCO2, Venous 55 (H) 41 - 51 mm Hg    POCT pO2, Venous 49 (H) 35 - 45 mm Hg    POCT SO2, Venous 85 (H) 45 - 75 %    POCT Oxy Hemoglobin, Venous 82.1 (H) 45.0 - 75.0 %    POCT Hematocrit Calculated, Venous 44.0 41.0 - 52.0 %    POCT Sodium, Venous 134 (L) 136 - 145 mmol/L    POCT Potassium, Venous 4.6 3.5 - 5.3 mmol/L    POCT Chloride, Venous 97 (L) 98 - 107 mmol/L    POCT Ionized Calicum, Venous 1.18 1.10 - 1.33 mmol/L    POCT Glucose, Venous 107 (H) 74 - 99 mg/dL    POCT Lactate, Venous 1.3 0.4 - 2.0 mmol/L    POCT Base Excess,  Venous 4.9 (H) -2.0 - 3.0 mmol/L    POCT HCO3 Calculated, Venous 31.8 (H) 22.0 - 26.0 mmol/L    POCT Hemoglobin, Venous 14.8 13.5 - 17.5 g/dL    POCT Anion Gap, Venous 10.0 10.0 - 25.0 mmol/L    Patient Temperature 37.0 degrees Celsius    FiO2 96 %   Troponin I, High Sensitivity, Initial   Result Value Ref Range    Troponin I, High Sensitivity 14 0 - 20 ng/L   Sars-CoV-2 and Influenza A/B PCR   Result Value Ref Range    Flu A Result Not Detected Not Detected    Flu B Result Not Detected Not Detected    Coronavirus 2019, PCR Not Detected Not Detected   Troponin, High Sensitivity, 1 Hour   Result Value Ref Range    Troponin I, High Sensitivity 13 0 - 20 ng/L   Basic Metabolic Panel   Result Value Ref Range    Glucose 192 (H) 74 - 99 mg/dL    Sodium 135 (L) 136 - 145 mmol/L    Potassium 4.6 3.5 - 5.3 mmol/L    Chloride 98 98 - 107 mmol/L    Bicarbonate 29 21 - 32 mmol/L    Anion Gap 13 10 - 20 mmol/L    Urea Nitrogen 50 (H) 6 - 23 mg/dL    Creatinine 1.96 (H) 0.50 - 1.30 mg/dL    eGFR 36 (L) >60 mL/min/1.73m*2    Calcium 8.7 8.6 - 10.3 mg/dL   CBC   Result Value Ref Range    WBC 6.4 4.4 - 11.3 x10*3/uL    nRBC 0.0 0.0 - 0.0 /100 WBCs    RBC 5.41 4.50 - 5.90 x10*6/uL    Hemoglobin 14.8 13.5 - 17.5 g/dL    Hematocrit 46.3 41.0 - 52.0 %    MCV 86 80 - 100 fL    MCH 27.4 26.0 - 34.0 pg    MCHC 32.0 32.0 - 36.0 g/dL    RDW 16.6 (H) 11.5 - 14.5 %    Platelets 192 150 - 450 x10*3/uL         Assessment/Plan     Shortness of breath   Chest pain   Chronic respiratory failure on home oxygen at 4 L baseline  CKD stage III  Paroxysmal atrial fibrillation on Eliquis  History of STEMI 6/2023- s/p PCI of OM1 and OM2  CAD  Dyslipidemia  Seizure disorder        7/27: As above, presenting with chest pain and shortness of breath. Patient states that he started feeling chest pain earlier in the day and had worsened throughout the day. At its worst he rated his pain 8/10 that was located in the midsternum. The pain he describes as a sharp  ache and a burning that worsens with deep breaths. The pain did not radiate to the neck, jaw, or back.  He was given IV morphine and dilaudid in which relieved his chest pain. At this time he states to still having the chest pain, but only worsening with deep breathing no pain at rest or with shallow breathing.    Vitals this morning show a heart rate of 86, blood pressure 131/76 satting 98% on 4 L nasal cannula (baseline).  On telemetry patient is sinus rhythm with heart rate 60s-70s.  Patient had echocardiogram completed in December 2024 for that showed an EF of 55± percent, left ventricular diastolic function was not evaluated due to inconsistent or technically suboptimal data.  Right ventricle is normal in size, right ventricle systolic function is normal, +1 mitral valve regurgitation, aortic sclerosis without significant stenosis.   With the patient's increased creatinine his home medications of spironolactone, Jardiance, and losartan were placed on hold.  Admitting team has resumed his home medications of apixaban 5 mg twice daily for stroke risk reduction in the setting of atrial fibrillation, atorvastatin 40 mg daily, carvedilol 3.125 mg twice daily, and torsemide 50 mg daily. Will hold patients torsemide as well, with the increased creatinine.  At this time the patient is not having an acute coronary event; troponins are negative at 14 and 13. The chest discomfort that the patient describes is pleuric in nature as he describes his chest discomfort as a sharp ache and a burn when he takes a deep breath, the pain only worsens with deep breathing no pain at rest or with shallow breathing, and there are no ischemic changes noted on the EKG. With the patient's cardiac history and increased creatinine, will follow you for an additional 1 to 2 days.       I spent 60 minutes in the professional and overall care of this patient.                   [1]   Family History  Problem Relation Name Age of Onset     Pancreatic cancer Father      Hypertension Father      Hypertension Sister      Hypertension Brother

## 2025-07-27 NOTE — PROGRESS NOTES
Physical Therapy    Physical Therapy Evaluation & Treatment    Patient Name: Kapil Luna  MRN: [id number]  Department: [address]  Room: [facility]414-A  Today's Date: 7/27/2025   Time Calculation  Start Time: 0815  Stop Time: 0843  Time Calculation (min): 28 min    Assessment/Plan   PT Assessment  PT Assessment Results: Decreased strength, Decreased endurance, Impaired balance, Decreased mobility, Decreased safety awareness  Rehab Prognosis: Good  Barriers to Discharge Home: No anticipated barriers  Evaluation/Treatment Tolerance: Patient tolerated treatment well  Medical Staff Made Aware: Yes  Strengths: Ability to acquire knowledge, Attitude of self, Coping skills, Support and attitude of living partners, Support of extended family/friends  Barriers to Participation: Comorbidities  End of Session Communication: Bedside nurse  Assessment Comment: Pt demonstrates impaired functional mobility from baseline level; pt with mild balance/strength deficits and decreased overall activity tolerance; pt would benefit from continued skilled PT services during hospital stay to maximize functional mobility outcomes upon d/c.  End of Session Patient Position: Bed, 2 rail up, Alarm off, not on at start of session (seated on EOB; RN aware)   IP OR SWING BED PT PLAN  Inpatient or Swing Bed: Inpatient  PT Plan  Treatment/Interventions: Transfer training, Gait training, Stair training, Balance training, Neuromuscular re-education, Strengthening, Endurance training, Therapeutic exercise, Therapeutic activity  PT Plan: Ongoing PT  PT Frequency: 4 times per week (during this acute inpatient hospitalization)  PT Discharge Recommendations: Low intensity level of continued care  Equipment Recommended upon Discharge: Wheeled walker  PT Recommended Transfer Status: Stand by assist, Assistive device  PT - OK to Discharge: Yes      Subjective     PT Visit Info:  PT Received On: 07/27/25  General Visit Information:  General  Reason for Referral:  impaired functional mobility d/t chest pain  Referred By: Dr. Mary MD  Past Medical History Relevant to Rehab: chronic respiratory failure, CHF, CAD, CKD, COPD, PAF, anxiety, seizure disorder, CVA, RASHID, DVT  Family/Caregiver Present: No  Prior to Session Communication: Bedside nurse  Patient Position Received: Bed, 2 rail up, Alarm off, not on at start of session  Preferred Learning Style: verbal  General Comment: Pt is a 70 year-old M admitted from home with c/o chest pain and SOB; cardiac enzymes normal upon admission.  Home Living:  Home Living  Type of Home: House  Lives With: Spouse  Home Adaptive Equipment: Cane  Home Layout: Two level, Bed/bath upstairs, Stairs to alternate level with rails  Alternate Level Stairs-Rails:  (unilateral)  Alternate Level Stairs-Number of Steps: 12  Home Access: Stairs to enter with rails  Entrance Stairs-Rails:  (unilateral)  Entrance Stairs-Number of Steps: 4  Bathroom Shower/Tub: Walk-in shower  Bathroom Toilet: Standard  Bathroom Equipment: Grab bars in shower, Shower chair with back  Bathroom Accessibility: full bath on second floor  Home Living Comments: 4-5L O2 via NC prn; sleeps in recliner but negotiates stairs for showering  Prior Level of Function:  Prior Function Per Pt/Caregiver Report  Level of Tranquillity: Independent with ADLs and functional transfers, Needs assistance with homemaking  Receives Help From: Family  ADL Assistance: Independent  Homemaking Assistance: Needs assistance (assist from spouse for iADLs)  Ambulatory Assistance: Independent (mod indep with SC prn)  Vocational: Retired  Prior Function Comments: denies h/o falls  Precautions:  Precautions  Hearing/Visual Limitations: reading glasses; hearing WFL  Medical Precautions: Fall precautions, Oxygen therapy device and L/min, Seizure precautions (4L O2 via NC)  Precautions Comment: +telemetry        Vital Signs Comment: SpO2 94-97% throughout on 4L O2 via NC; HR 80-84 bpm    Objective    Pain:  Pain Assessment  Pain Assessment: 0-10  0-10 (Numeric) Pain Score: 6  Pain Type: Acute pain  Pain Location: Chest  Pain Interventions: Repositioned, Ambulation/increased activity  Response to Interventions: Content/relaxed  Cognition:  Cognition  Overall Cognitive Status: Within Functional Limits  Orientation Level: Oriented X4  Cognition Comments: pleasant/cooperative; talkative    General Assessments:  General Observation  General Observation: pleasant/cooperative; visible skin intact     Activity Tolerance  Endurance: Tolerates 10 - 20 min exercise with multiple rests    Sensation  Light Touch: No apparent deficits  Sensation Comment: pt denies paresthesias    Strength  Strength Comments: BLE > 4-/5  Strength  Strength Comments: BLE > 4-/5    Coordination  Movements are Fluid and Coordinated: Yes    Postural Control  Postural Control: Within Functional Limits    Static Sitting Balance  Static Sitting-Balance Support: Feet supported  Static Sitting-Level of Assistance: Independent    Static Standing Balance  Static Standing-Balance Support: Bilateral upper extremity supported  Static Standing-Level of Assistance: Close supervision  Static Standing-Comment/Number of Minutes: use of BUE on RW  Functional Assessments:     Bed Mobility  Bed Mobility: Yes  Bed Mobility 1  Bed Mobility 1: Supine to sitting  Level of Assistance 1: Modified independent  Bed Mobility Comments 1: use of bedrails, increased time to complete transfer    Transfers  Transfer: Yes  Transfer 1  Transfer From 1: Sit to, Stand to  Transfer to 1: Sit, Stand  Technique 1: Sit to stand, Stand to sit  Transfer Device 1: Walker  Transfer Level of Assistance 1: Close supervision  Trials/Comments 1: supervision for balance/safety; cueing for safe hand placement with use of RW    Ambulation/Gait Training  Ambulation/Gait Training Performed: Yes  Ambulation/Gait Training 1  Surface 1: Level tile  Device 1: Rolling walker  Assistance 1: Close  supervision  Quality of Gait 1:  (decreased tessy, reciprocating pattern, no LOB noted)  Comments/Distance (ft) 1: 100' x 2; SpO2 94-97% on 4L O2 via NC throughout    Stairs  Stairs: No    Extremity/Trunk Assessments:  RLE   RLE : Within Functional Limits  LLE   LLE : Within Functional Limits  Treatments:  Ambulation/Gait Training  Ambulation/Gait Training Performed: Yes  Ambulation/Gait Training 1  Surface 1: Level tile  Device 1: Rolling walker  Assistance 1: Close supervision  Quality of Gait 1:  (decreased tessy, reciprocating pattern, no LOB noted)  Comments/Distance (ft) 1: 100' x 2; SpO2 94-97% on 4L O2 via NC throughout  Outcome Measures:  [facility] Basic Mobility  Turning from your back to your side while in a flat bed without using bedrails: None  Moving from lying on your back to sitting on the side of a flat bed without using bedrails: None  Moving to and from bed to chair (including a wheelchair): A little  Standing up from a chair using your arms (e.g. wheelchair or bedside chair): A little  To walk in hospital room: A little  Climbing 3-5 steps with railing: A lot  Basic Mobility - Total Score: 19    Encounter Problems       Encounter Problems (Active)       PT STG Problem       Patient will transfer sit to stand and stand to sit with independent assist to facilitate mobility. (Progressing)       Start:  07/27/25    Expected End:  08/27/25            Patient will amb 150 feet rolling walker device including two turns on even surface with independent assist to facilitate safe mobility.   (Progressing)       Start:  07/27/25    Expected End:  08/27/25            Patient will negotiate 12 stairs with one rail(s) and independent} assist with no device for in home and community. (Not Progressing)       Start:  07/27/25    Expected End:  08/27/25                   Education Documentation  Mobility Training, taught by Yuly Melara, PT at 7/27/2025 10:10 AM.  Learner: Patient  Readiness:  Acceptance  Method: Explanation, Demonstration  Response: Demonstrated Understanding  Comment: educated on PT POC and safety/sequencing during functional mobility training and therex completion    Education Comments  No comments found.

## 2025-07-27 NOTE — PROGRESS NOTES
07/27/25 1830   [facility] Disability Status   Are you deaf or do you have serious difficulty hearing? N   Are you blind or do you have serious difficulty seeing, even when wearing glasses? N   Because of a physical, mental, or emotional condition, do you have serious difficulty concentrating, remembering, or making decisions? (5 years old or older) N   Do you have serious difficulty walking or climbing stairs? N   Do you have serious difficulty dressing or bathing? N   Because of a physical, mental, or emotional condition, do you have serious difficulty doing errands alone such as visiting the doctor? N

## 2025-07-27 NOTE — PROGRESS NOTES
Kapil MICHAEL Child is a 70 y.o. male on day 0 of admission presenting with Chest pain.      Subjective     Patient presented to [facility] ER complaint of chest pain.  He has been afebrile during the night.         Objective     Last Recorded Vitals  /76 (BP Location: Left arm, Patient Position: Lying)   Pulse 84   Temp 36.6 °C (97.9 °F) (Axillary)   Resp 16   Wt 149 kg (328 lb)   SpO2 94%   Intake/Output last 3 Shifts:    Intake/Output Summary (Last 24 hours) at 7/27/2025 1131  Last data filed at 7/27/2025 0719  Gross per 24 hour   Intake --   Output 300 ml   Net -300 ml       Admission Weight  Weight: 149 kg (328 lb) (07/26/25 1907)    Daily Weight  07/26/25 : 149 kg (328 lb)    Image Results  XR chest 1 view  Narrative: STUDY:  Chest Radiograph;  [7-; 07:36 pm]  INDICATION:  Shortness of breath.  COMPARISON:  XR chest 5-  ACCESSION NUMBER(S):  [id number]  ORDERING CLINICIAN:  RACHEL SALCEDO  TECHNIQUE:  Frontal chest was obtained at 19:35 hours.  FINDINGS:  CARDIOMEDIASTINAL SILHOUETTE:  Cardiomediastinal silhouette is normal in size and configuration.     LUNGS:  Lungs are clear.  There is no evidence of pneumothorax or pleural  effusion.     ABDOMEN:  No remarkable upper abdominal findings.     BONES:  No acute osseous changes.  Impression: No acute cardiopulmonary process.  Signed by Israel Meza MD      Physical Exam    General:  cooperating during physical exam.  HEENT: Pupils are equal and reactive to light and commendation , oral mucosa moist, no JVD .  Cardiovascular: PMI nondisplaced  Lungs: Scattered wheezing in both lower extremities  Abdomen: No hepatosplenomegaly appreciated, soft , not tender, positive bowel sounds, positive bowel movement.  Neuro: Alert and oriented x3, strength in upper and lower extremities , sensation intact.  Psych: Patient had great insight was going on  Musculoskeletal: No swelling in lower extremities, no limitation in range of motion.  Vascular:  Pulses are intact in upper and lower extremities  Skin: Changes in both lower extremities secondary to venous stasis.    Assessment and plan     Chest pain  Likely musculoskeletal, pleuritic in nature  EKG no ischemic changes.  Cardiology on case.    COPD exacerbation  Start patient on low-dose Solu-Medrol  Aerosol treatment  Consult Dr. Pineda from pulmonary services.    History of coronary artery disease  Continue with current medication.  Monitor close    Acute kidney injury  Hold torsemide, spironolactone, losartan and Jardiance.  Monitor kidney function in AM.    Atrial fibrillation.  Rate controlled  Patient is on blood thinner.    Congestive heart failure  With diastolic dysfunction.  Patient seem to be euvolemic    Seizure disorder  Continue with current medication    Chronic respiratory failure with hypoxia  Patient is on 4 L oxygen.    Check CBC and BMP in AM.  Discussed with nurse at bedside.  Add Solu-Medrol.  Labs reviewed, sodium 135, creatinine 1.96  Time spent with the patient 50-minutes      Melita Mendoza MD

## 2025-07-28 ENCOUNTER — APPOINTMENT (OUTPATIENT)
Dept: CARDIOLOGY | Facility: HOSPITAL | Age: 70
End: 2025-07-28
Payer: COMMERCIAL

## 2025-07-28 LAB
ANION GAP SERPL CALCULATED.3IONS-SCNC: 11 MMOL/L (ref 10–20)
AORTIC VALVE MEAN GRADIENT: 4 MMHG
AORTIC VALVE PEAK VELOCITY: 1.34 M/S
AV PEAK GRADIENT: 7 MMHG
AVA (PEAK VEL): 2.62 CM2
AVA (VTI): 2.66 CM2
BUN SERPL-MCNC: 50 MG/DL (ref 6–23)
CALCIUM SERPL-MCNC: 8.5 MG/DL (ref 8.6–10.3)
CHLORIDE SERPL-SCNC: 100 MMOL/L (ref 98–107)
CO2 SERPL-SCNC: 29 MMOL/L (ref 21–32)
CREAT SERPL-MCNC: 1.82 MG/DL (ref 0.5–1.3)
EGFRCR SERPLBLD CKD-EPI 2021: 39 ML/MIN/1.73M*2
EJECTION FRACTION APICAL 4 CHAMBER: 53.8
EJECTION FRACTION: 58 %
ERYTHROCYTE [DISTWIDTH] IN BLOOD BY AUTOMATED COUNT: 16 % (ref 11.5–14.5)
GLUCOSE BLD MANUAL STRIP-MCNC: 106 MG/DL (ref 74–99)
GLUCOSE SERPL-MCNC: 103 MG/DL (ref 74–99)
HCT VFR BLD AUTO: 45.3 % (ref 41–52)
HGB BLD-MCNC: 13.8 G/DL (ref 13.5–17.5)
LEFT VENTRICULAR OUTFLOW TRACT DIAMETER: 2.2 CM
LV EJECTION FRACTION BIPLANE: 55 %
MAGNESIUM SERPL-MCNC: 2.59 MG/DL (ref 1.6–2.4)
MCH RBC QN AUTO: 27.1 PG (ref 26–34)
MCHC RBC AUTO-ENTMCNC: 30.5 G/DL (ref 32–36)
MCV RBC AUTO: 89 FL (ref 80–100)
MITRAL VALVE E/A RATIO: 0.76
NRBC BLD-RTO: 0 /100 WBCS (ref 0–0)
PHOSPHATE SERPL-MCNC: 4.7 MG/DL (ref 2.5–4.9)
PLATELET # BLD AUTO: 195 X10*3/UL (ref 150–450)
POTASSIUM SERPL-SCNC: 4.6 MMOL/L (ref 3.5–5.3)
RBC # BLD AUTO: 5.1 X10*6/UL (ref 4.5–5.9)
SODIUM SERPL-SCNC: 135 MMOL/L (ref 136–145)
WBC # BLD AUTO: 12.2 X10*3/UL (ref 4.4–11.3)

## 2025-07-28 PROCEDURE — 97116 GAIT TRAINING THERAPY: CPT | Mod: GP,CQ

## 2025-07-28 PROCEDURE — 96375 TX/PRO/DX INJ NEW DRUG ADDON: CPT | Mod: 59

## 2025-07-28 PROCEDURE — 2500000002 HC RX 250 W HCPCS SELF ADMINISTERED DRUGS (ALT 637 FOR MEDICARE OP, ALT 636 FOR OP/ED): Performed by: INTERNAL MEDICINE

## 2025-07-28 PROCEDURE — 94640 AIRWAY INHALATION TREATMENT: CPT

## 2025-07-28 PROCEDURE — 2500000001 HC RX 250 WO HCPCS SELF ADMINISTERED DRUGS (ALT 637 FOR MEDICARE OP): Performed by: INTERNAL MEDICINE

## 2025-07-28 PROCEDURE — 99223 1ST HOSP IP/OBS HIGH 75: CPT | Performed by: STUDENT IN AN ORGANIZED HEALTH CARE EDUCATION/TRAINING PROGRAM

## 2025-07-28 PROCEDURE — C8929 TTE W OR WO FOL WCON,DOPPLER: HCPCS

## 2025-07-28 PROCEDURE — 83735 ASSAY OF MAGNESIUM: CPT | Performed by: STUDENT IN AN ORGANIZED HEALTH CARE EDUCATION/TRAINING PROGRAM

## 2025-07-28 PROCEDURE — 99233 SBSQ HOSP IP/OBS HIGH 50: CPT | Performed by: INTERNAL MEDICINE

## 2025-07-28 PROCEDURE — 2500000004 HC RX 250 GENERAL PHARMACY W/ HCPCS (ALT 636 FOR OP/ED): Mod: JZ | Performed by: STUDENT IN AN ORGANIZED HEALTH CARE EDUCATION/TRAINING PROGRAM

## 2025-07-28 PROCEDURE — 99232 SBSQ HOSP IP/OBS MODERATE 35: CPT | Performed by: NURSE PRACTITIONER

## 2025-07-28 PROCEDURE — 2500000004 HC RX 250 GENERAL PHARMACY W/ HCPCS (ALT 636 FOR OP/ED): Mod: JZ | Performed by: INTERNAL MEDICINE

## 2025-07-28 PROCEDURE — 97166 OT EVAL MOD COMPLEX 45 MIN: CPT | Mod: GO

## 2025-07-28 PROCEDURE — 85027 COMPLETE CBC AUTOMATED: CPT | Performed by: INTERNAL MEDICINE

## 2025-07-28 PROCEDURE — 97530 THERAPEUTIC ACTIVITIES: CPT | Mod: GP,CQ

## 2025-07-28 PROCEDURE — 2500000004 HC RX 250 GENERAL PHARMACY W/ HCPCS (ALT 636 FOR OP/ED): Mod: JW | Performed by: STUDENT IN AN ORGANIZED HEALTH CARE EDUCATION/TRAINING PROGRAM

## 2025-07-28 PROCEDURE — 93005 ELECTROCARDIOGRAM TRACING: CPT

## 2025-07-28 PROCEDURE — 96376 TX/PRO/DX INJ SAME DRUG ADON: CPT | Mod: 59

## 2025-07-28 PROCEDURE — 2500000001 HC RX 250 WO HCPCS SELF ADMINISTERED DRUGS (ALT 637 FOR MEDICARE OP): Performed by: REGISTERED NURSE

## 2025-07-28 PROCEDURE — 2500000001 HC RX 250 WO HCPCS SELF ADMINISTERED DRUGS (ALT 637 FOR MEDICARE OP): Performed by: STUDENT IN AN ORGANIZED HEALTH CARE EDUCATION/TRAINING PROGRAM

## 2025-07-28 PROCEDURE — 93306 TTE W/DOPPLER COMPLETE: CPT | Performed by: INTERNAL MEDICINE

## 2025-07-28 PROCEDURE — G0378 HOSPITAL OBSERVATION PER HR: HCPCS

## 2025-07-28 PROCEDURE — 36415 COLL VENOUS BLD VENIPUNCTURE: CPT | Performed by: INTERNAL MEDICINE

## 2025-07-28 PROCEDURE — 80048 BASIC METABOLIC PNL TOTAL CA: CPT | Performed by: INTERNAL MEDICINE

## 2025-07-28 PROCEDURE — 82947 ASSAY GLUCOSE BLOOD QUANT: CPT | Mod: 59

## 2025-07-28 PROCEDURE — 84100 ASSAY OF PHOSPHORUS: CPT | Performed by: STUDENT IN AN ORGANIZED HEALTH CARE EDUCATION/TRAINING PROGRAM

## 2025-07-28 RX ORDER — BUMETANIDE 0.25 MG/ML
2 INJECTION, SOLUTION INTRAMUSCULAR; INTRAVENOUS ONCE
Status: COMPLETED | OUTPATIENT
Start: 2025-07-28 | End: 2025-07-28

## 2025-07-28 RX ORDER — NYSTATIN 100000 [USP'U]/G
1 POWDER TOPICAL 3 TIMES DAILY PRN
Status: DISCONTINUED | OUTPATIENT
Start: 2025-07-28 | End: 2025-07-29 | Stop reason: HOSPADM

## 2025-07-28 RX ORDER — IPRATROPIUM BROMIDE AND ALBUTEROL SULFATE 2.5; .5 MG/3ML; MG/3ML
3 SOLUTION RESPIRATORY (INHALATION)
Status: DISCONTINUED | OUTPATIENT
Start: 2025-07-28 | End: 2025-07-28

## 2025-07-28 RX ORDER — OXYCODONE HCL 5 MG/5 ML
5 SOLUTION, ORAL ORAL EVERY 6 HOURS PRN
Refills: 0 | Status: DISCONTINUED | OUTPATIENT
Start: 2025-07-28 | End: 2025-07-29 | Stop reason: HOSPADM

## 2025-07-28 RX ORDER — ALUMINUM HYDROXIDE, MAGNESIUM HYDROXIDE, AND SIMETHICONE 1200; 120; 1200 MG/30ML; MG/30ML; MG/30ML
10 SUSPENSION ORAL ONCE
Status: COMPLETED | OUTPATIENT
Start: 2025-07-28 | End: 2025-07-28

## 2025-07-28 RX ORDER — IPRATROPIUM BROMIDE AND ALBUTEROL SULFATE 2.5; .5 MG/3ML; MG/3ML
3 SOLUTION RESPIRATORY (INHALATION)
Status: DISCONTINUED | OUTPATIENT
Start: 2025-07-29 | End: 2025-07-29

## 2025-07-28 RX ORDER — OXYCODONE HYDROCHLORIDE 5 MG/1
10 TABLET ORAL EVERY 6 HOURS PRN
Refills: 0 | Status: DISCONTINUED | OUTPATIENT
Start: 2025-07-28 | End: 2025-07-29 | Stop reason: HOSPADM

## 2025-07-28 RX ADMIN — HYDROMORPHONE HYDROCHLORIDE 1 MG: 1 INJECTION, SOLUTION INTRAMUSCULAR; INTRAVENOUS; SUBCUTANEOUS at 07:39

## 2025-07-28 RX ADMIN — IPRATROPIUM BROMIDE AND ALBUTEROL SULFATE 3 ML: 2.5; .5 SOLUTION RESPIRATORY (INHALATION) at 12:46

## 2025-07-28 RX ADMIN — METHYLPREDNISOLONE SODIUM SUCCINATE 40 MG: 40 INJECTION, POWDER, FOR SOLUTION INTRAMUSCULAR; INTRAVENOUS at 10:58

## 2025-07-28 RX ADMIN — ALLOPURINOL 300 MG: 300 TABLET ORAL at 08:26

## 2025-07-28 RX ADMIN — OXYCODONE HYDROCHLORIDE 10 MG: 5 TABLET ORAL at 10:58

## 2025-07-28 RX ADMIN — TRAZODONE HYDROCHLORIDE 100 MG: 100 TABLET ORAL at 20:52

## 2025-07-28 RX ADMIN — CARVEDILOL 3.12 MG: 3.12 TABLET, FILM COATED ORAL at 08:26

## 2025-07-28 RX ADMIN — BUDESONIDE 0.5 MG: 0.5 INHALANT RESPIRATORY (INHALATION) at 07:05

## 2025-07-28 RX ADMIN — LEVETIRACETAM 750 MG: 250 TABLET, FILM COATED ORAL at 20:51

## 2025-07-28 RX ADMIN — NYSTATIN 1 APPLICATION: 100000 POWDER TOPICAL at 03:49

## 2025-07-28 RX ADMIN — APIXABAN 5 MG: 5 TABLET, FILM COATED ORAL at 20:52

## 2025-07-28 RX ADMIN — HYDROMORPHONE HYDROCHLORIDE 0.2 MG: 0.5 INJECTION, SOLUTION INTRAMUSCULAR; INTRAVENOUS; SUBCUTANEOUS at 20:52

## 2025-07-28 RX ADMIN — CARVEDILOL 3.12 MG: 3.12 TABLET, FILM COATED ORAL at 20:52

## 2025-07-28 RX ADMIN — HYDROMORPHONE HYDROCHLORIDE 0.2 MG: 0.5 INJECTION, SOLUTION INTRAMUSCULAR; INTRAVENOUS; SUBCUTANEOUS at 14:19

## 2025-07-28 RX ADMIN — ALUMINUM HYDROXIDE, MAGNESIUM HYDROXIDE, AND DIMETHICONE 10 ML: 200; 20; 200 SUSPENSION ORAL at 12:17

## 2025-07-28 RX ADMIN — HYDROMORPHONE HYDROCHLORIDE 1 MG: 1 INJECTION, SOLUTION INTRAMUSCULAR; INTRAVENOUS; SUBCUTANEOUS at 03:36

## 2025-07-28 RX ADMIN — OXYCODONE HYDROCHLORIDE 10 MG: 5 TABLET ORAL at 17:03

## 2025-07-28 RX ADMIN — PERFLUTREN 5 ML OF DILUTION: 6.52 INJECTION, SUSPENSION INTRAVENOUS at 14:13

## 2025-07-28 RX ADMIN — IPRATROPIUM BROMIDE AND ALBUTEROL SULFATE 3 ML: 2.5; .5 SOLUTION RESPIRATORY (INHALATION) at 19:04

## 2025-07-28 RX ADMIN — LEVETIRACETAM 750 MG: 250 TABLET, FILM COATED ORAL at 08:26

## 2025-07-28 RX ADMIN — APIXABAN 5 MG: 5 TABLET, FILM COATED ORAL at 08:27

## 2025-07-28 RX ADMIN — ATORVASTATIN CALCIUM 40 MG: 40 TABLET, FILM COATED ORAL at 20:51

## 2025-07-28 RX ADMIN — BUPROPION HYDROCHLORIDE 150 MG: 150 TABLET, EXTENDED RELEASE ORAL at 08:26

## 2025-07-28 RX ADMIN — FORMOTEROL FUMARATE 20 MCG: 20 SOLUTION RESPIRATORY (INHALATION) at 07:05

## 2025-07-28 RX ADMIN — BUMETANIDE 2 MG: 0.25 INJECTION INTRAMUSCULAR; INTRAVENOUS at 14:19

## 2025-07-28 ASSESSMENT — COGNITIVE AND FUNCTIONAL STATUS - GENERAL
WALKING IN HOSPITAL ROOM: A LITTLE
DAILY ACTIVITIY SCORE: 24
MOBILITY SCORE: 20
CLIMB 3 TO 5 STEPS WITH RAILING: A LITTLE
DAILY ACTIVITIY SCORE: 24
MOBILITY SCORE: 24
MOBILITY SCORE: 24
DAILY ACTIVITIY SCORE: 24
MOVING TO AND FROM BED TO CHAIR: A LITTLE
STANDING UP FROM CHAIR USING ARMS: A LITTLE

## 2025-07-28 ASSESSMENT — PAIN DESCRIPTION - ORIENTATION
ORIENTATION: UPPER
ORIENTATION: MID

## 2025-07-28 ASSESSMENT — PAIN SCALES - GENERAL
PAINLEVEL_OUTOF10: 9
PAINLEVEL_OUTOF10: 8
PAINLEVEL_OUTOF10: 3
PAINLEVEL_OUTOF10: 8
PAINLEVEL_OUTOF10: 3
PAINLEVEL_OUTOF10: 6
PAINLEVEL_OUTOF10: 5 - MODERATE PAIN
PAINLEVEL_OUTOF10: 5 - MODERATE PAIN
PAINLEVEL_OUTOF10: 4
PAINLEVEL_OUTOF10: 8
PAINLEVEL_OUTOF10: 4
PAINLEVEL_OUTOF10: 3

## 2025-07-28 ASSESSMENT — PAIN DESCRIPTION - LOCATION
LOCATION: CHEST

## 2025-07-28 ASSESSMENT — PAIN DESCRIPTION - DESCRIPTORS
DESCRIPTORS: ACHING
DESCRIPTORS: SHARP;ACHING
DESCRIPTORS: ACHING
DESCRIPTORS: ACHING;SHARP
DESCRIPTORS: ACHING
DESCRIPTORS: SHARP;ACHING

## 2025-07-28 ASSESSMENT — ACTIVITIES OF DAILY LIVING (ADL)
ADL_ASSISTANCE: INDEPENDENT
BATHING_ASSISTANCE: INDEPENDENT

## 2025-07-28 NOTE — NURSING NOTE
Bedside report completed, questions addressed.  Care assumed.      1929: medicated for mid sternal chest pain, worse with deep inspiration, pain 8/10  2115: ambulated to the bathroom independently, educated on using the urinal for accurate measurement of output.   2313: medicated for mid sternal chest pain, worse with deep inspiration, pain 8/10  0336: medicated for mid sternal chest pain, 8/10.  Reports excoriation under the left breast tissue.  Renetta NP notified and order received for Nystatin powder, Nystatin powder applied to affected area.

## 2025-07-28 NOTE — PROGRESS NOTES
Occupational Therapy    Evaluation    Patient Name: Kapil Luna  MRN: [id number]  Department: [facility]  Room: [facility]414  Today's Date: 7/28/2025  Time Calculation  Start Time: 0920  Stop Time: 0940  Time Calculation (min): 20 min        Assessment:  OT Assessment: pt is currently at baseline level of function, independent with ADLs and modified independent with functional mobility. pt would benefit from use of FWW during mobility for increased safety until strength is regained. pt would also benefit from pulmonary rehabilitation to increase endurance for everyday tasks. no need for skilled OT at this time.  Prognosis: Good  Barriers to Discharge Home: No anticipated barriers  Evaluation/Treatment Tolerance: Patient tolerated treatment well  Medical Staff Made Aware: Yes  End of Session Communication: Bedside nurse  End of Session Patient Position: Bed, 2 rail up, Alarm off, not on at start of session (all needs in reach)  Prognosis: Good  Evaluation/Treatment Tolerance: Patient tolerated treatment well  Medical Staff Made Aware: Yes  Strengths: Ability to acquire knowledge, Attitude of self, Premorbid level of function, Support and attitude of living partners, Support of extended family/friends  Barriers to Participation: Comorbidities  Plan:  No Skilled OT: Independent with ADLs  OT Frequency: OT eval only  OT Discharge Recommendations: No further acute OT, No OT needed after discharge  Equipment Recommended upon Discharge: Wheeled walker  OT Recommended Transfer Status: Assist of 1 (AD)  OT - OK to Discharge: Yes       Subjective   Current Problem:  1. Acute chest pain        2. Shortness of breath        3. Acute heart failure with preserved ejection fraction  Referral to Home Care        OT Visit Info:  OT Received On: 07/28/25  General:  General  Reason for Referral: impaired ADLs; admitted with SOB and chest pain  Referred By: Dr. Mary MD  Past Medical History Relevant to Rehab: chronic respiratory  failure, CHF, CAD, CKD, COPD, PAF, anxiety, seizure disorder, CVA, RASHID, DVT  Family/Caregiver Present: No  Prior to Session Communication: Bedside nurse  Patient Position Received: Bed, 2 rail up, Alarm off, not on at start of session  General Comment: cleared by nursing. pt pleasant and agreeable to therapy  Precautions:  Medical Precautions: Fall precautions, Oxygen therapy device and L/min, Seizure precautions (4L O2)    Pain:  Pain Assessment  Pain Assessment: 0-10  0-10 (Numeric) Pain Score: 4  Pain Type: Acute pain  Pain Location: Chest  Pain Orientation: Mid  Pain Interventions: Repositioned, Ambulation/increased activity  Response to Interventions: Content/relaxed    Objective   Cognition:  Overall Cognitive Status: Within Functional Limits  Orientation Level: Oriented X4  Following Commands: Follows one step commands without difficulty  Safety Judgment: Decreased awareness of need for safety precautions  Problem Solving: Able to problem solve independently  Insight: Within function limits  Impulsive: Mildly           Home Living:  Type of Home: House  Lives With: Spouse  Home Adaptive Equipment: Cane, Other (Comment) (rollator)  Home Layout: 1/2 bath on main level, Bed/bath upstairs, Multi-level, Work area in basement  Home Access: Stairs to enter with rails  Entrance Stairs-Number of Steps: 4  Bathroom Shower/Tub: Walk-in shower  Bathroom Toilet: Standard  Bathroom Equipment: Grab bars in shower, Shower chair with back  Bathroom Accessibility: full bath second floor  Home Living Comments: pt has been sleeping on first floor in recliner, but reports no concerns about going upstairs to second floor bathroom to shower  Prior Function:  Level of Andrew: Independent with ADLs and functional transfers, Needs assistance with homemaking  Receives Help From: Family  ADL Assistance: Independent  Homemaking Assistance: Needs assistance (assist from spouse)  Ambulatory Assistance: Independent (mod I with cane at  home. rollator in community)  Vocational: Retired  Hand Dominance: Left  Prior Function Comments: + drives, denies hx of falls    ADL:  Eating Assistance: Independent (drinking during session)  Grooming Assistance: Independent (washing hands standing at sink)  Bathing Assistance: Independent  UE Dressing Assistance: Independent  LE Dressing Assistance: Independent (adjusting pants standing at toilet)  Toileting Assistance with Device: Independent (standing at toilet)    Activity Tolerance:  Endurance: Decreased tolerance for upright activites    Bed Mobility/Transfers: Bed Mobility  Bed Mobility: Yes  Bed Mobility 1  Bed Mobility 1: Supine to sitting, Sitting to supine  Level of Assistance 1: Modified independent  Bed Mobility Comments 1: HOB elevated and use of bed rails.    Transfers  Transfer: Yes  Transfer 1  Technique 1: Sit to stand, Stand to sit  Transfer Device 1: Walker  Transfer Level of Assistance 1: Modified independent  Trials/Comments 1: 2X at EOB. good hand placement and safety    Functional Mobility:  Functional Mobility  Functional Mobility Performed: Yes  Functional Mobility 1  Surface 1: Level tile  Device 1: Rolling walker  Assistance 1: Independent  Comments 1: pt performed functional mobility mod I with FWW short household distance. min cues for walker management and safety    Sitting Balance:  Static Sitting Balance  Static Sitting-Balance Support: Feet supported  Static Sitting-Level of Assistance: Independent    Standing Balance:  Static Standing Balance  Static Standing-Balance Support: Bilateral upper extremity supported  Static Standing-Level of Assistance: Modified Independent     Vision:Vision - Basic Assessment  Current Vision: Wears glasses only for reading  Sensation:  Sensation Comment: pt denies numbess/tingling  Strength:  Strength Comments: BUE WFL    Coordination:  Movements are Fluid and Coordinated: Yes   Hand Function:  Gross Grasp: Functional  Coordination:  Functional  Extremities: RUE   RUE : Within Functional Limits and LUE   LUE: Within Functional Limits    Outcome Measures:[facility] Daily Activity  Putting on and taking off regular lower body clothing: None  Bathing (including washing, rinsing, drying): None  Putting on and taking off regular upper body clothing: None  Toileting, which includes using toilet, bedpan or urinal: None  Taking care of personal grooming such as brushing teeth: None  Eating Meals: None  Daily Activity - Total Score: 24    Education Documentation  Body Mechanics, taught by Susan Yung OT at 7/28/2025 10:13 AM.  Learner: Patient  Readiness: Acceptance  Method: Explanation  Response: Verbalizes Understanding, Needs Reinforcement  Comment: Educated pt safety, use of AD with mobility, OT POC    ADL Training, taught by Susan Yung OT at 7/28/2025 10:13 AM.  Learner: Patient  Readiness: Acceptance  Method: Explanation  Response: Verbalizes Understanding, Needs Reinforcement  Comment: Educated pt safety, use of AD with mobility, OT POC    Education Comments  No comments found.        OP EDUCATION:       Goals:

## 2025-07-28 NOTE — PROGRESS NOTES
Anticipate discharge soon. TCC met with patient this morning and he is agreeable for HHC, PT only. Referrals sent to [facility], waiting on review. At this time there is not a safe discharge plan in place. Will follow.      07/28/25 1250   Discharge Planning   Home or Post Acute Services In home services   Type of Home Care Services Home PT   Expected Discharge Disposition Home H  (TBD)   Does the patient need discharge transport arranged? No

## 2025-07-28 NOTE — PROGRESS NOTES
"Kapil MICHAEL Child is a 70 y.o. male on day 0 of admission presenting with Chest pain.    Subjective   Patient resting in bed. Reports he is having pain in his chest that is worse with inspiration. He reports , \"It's not my heart, it's my lungs\". He also reports he has not been able to sleep. Denies any palpitations or leg edema.       Objective     Physical Exam  Constitutional:       General: He is not in acute distress.     Appearance: Normal appearance. He is morbidly obese. He is not ill-appearing, toxic-appearing or diaphoretic.      Interventions: Nasal cannula in place.   HENT:      Head: Normocephalic.      Right Ear: External ear normal.      Left Ear: External ear normal.      Nose: Nose normal.      Mouth/Throat:      Mouth: Mucous membranes are moist.     Eyes:      Extraocular Movements: Extraocular movements intact.       Cardiovascular:      Rate and Rhythm: Normal rate and regular rhythm.      Pulses: Normal pulses.      Heart sounds: Normal heart sounds.   Pulmonary:      Effort: Pulmonary effort is normal.      Breath sounds: Decreased air movement present.   Abdominal:      General: There is no distension.      Palpations: Abdomen is soft.      Tenderness: There is no abdominal tenderness.     Musculoskeletal:         General: Normal range of motion.      Cervical back: Normal range of motion and neck supple.      Right lower leg: No edema.      Left lower leg: No edema.     Skin:     General: Skin is warm and dry.      Capillary Refill: Capillary refill takes less than 2 seconds.      Comments: Venous stasis skin changes BLE     Neurological:      General: No focal deficit present.      Mental Status: He is oriented to person, place, and time and easily aroused.     Psychiatric:         Mood and Affect: Mood normal.         Behavior: Behavior normal.         Last Recorded Vitals  Blood pressure 129/70, pulse 63, temperature 36.3 °C (97.3 °F), temperature source Oral, resp. rate 18, height 1.905 m " "(6' 3\"), weight 149 kg (328 lb), SpO2 96%.  Intake/Output last 3 Shifts:  I/O last 3 completed shifts:  In: 270 (1.8 mL/kg) [P.O.:270]  Out: 300 (2 mL/kg) [Urine:300 (0.1 mL/kg/hr)]  Weight: 148.8 kg     Relevant Results          Scheduled medications  Scheduled Medications[1]  Continuous medications  Continuous Medications[2]  PRN medications  PRN Medications[3]    Results for orders placed or performed during the hospital encounter of 07/26/25 (from the past 24 hours)   Basic metabolic panel   Result Value Ref Range    Glucose 103 (H) 74 - 99 mg/dL    Sodium 135 (L) 136 - 145 mmol/L    Potassium 4.6 3.5 - 5.3 mmol/L    Chloride 100 98 - 107 mmol/L    Bicarbonate 29 21 - 32 mmol/L    Anion Gap 11 10 - 20 mmol/L    Urea Nitrogen 50 (H) 6 - 23 mg/dL    Creatinine 1.82 (H) 0.50 - 1.30 mg/dL    eGFR 39 (L) >60 mL/min/1.73m*2    Calcium 8.5 (L) 8.6 - 10.3 mg/dL   CBC   Result Value Ref Range    WBC 12.2 (H) 4.4 - 11.3 x10*3/uL    nRBC 0.0 0.0 - 0.0 /100 WBCs    RBC 5.10 4.50 - 5.90 x10*6/uL    Hemoglobin 13.8 13.5 - 17.5 g/dL    Hematocrit 45.3 41.0 - 52.0 %    MCV 89 80 - 100 fL    MCH 27.1 26.0 - 34.0 pg    MCHC 30.5 (L) 32.0 - 36.0 g/dL    RDW 16.0 (H) 11.5 - 14.5 %    Platelets 195 150 - 450 x10*3/uL   Magnesium   Result Value Ref Range    Magnesium 2.59 (H) 1.60 - 2.40 mg/dL   Phosphorus   Result Value Ref Range    Phosphorus 4.7 2.5 - 4.9 mg/dL   POCT GLUCOSE   Result Value Ref Range    POCT Glucose 106 (H) 74 - 99 mg/dL   Transthoracic Echo Complete   Result Value Ref Range    BSA 2.81 m2    ECG 12 lead  Result Date: 7/28/2025  Sinus rhythm with 1st degree AV block Left bundle branch block Abnormal ECG When compared with ECG of 23-MAY-2025 20:33, Premature ventricular complexes are no longer Present Left bundle branch block is now Present    XR chest 1 view  Result Date: 7/26/2025  STUDY: Chest Radiograph;  [7-; 07:36 pm] INDICATION: Shortness of breath. COMPARISON: XR chest 5- ACCESSION NUMBER(S): " [id number] ORDERING CLINICIAN: RACHEL SALCEDO TECHNIQUE:  Frontal chest was obtained at 19:35 hours. FINDINGS: CARDIOMEDIASTINAL SILHOUETTE: Cardiomediastinal silhouette is normal in size and configuration.  LUNGS: Lungs are clear.  There is no evidence of pneumothorax or pleural effusion.  ABDOMEN: No remarkable upper abdominal findings.  BONES: No acute osseous changes.    No acute cardiopulmonary process. Signed by Israel Meza MD              Assessment & Plan  Chest pain    Shortness of breath   Chest pain   COPD exacerbation  Chronic respiratory failure on home oxygen at 4 L baseline  CKD stage III  Paroxysmal atrial fibrillation on Eliquis  History of STEMI 6/2023- s/p PCI of OM1 and OM2  CAD  Dyslipidemia  Seizure disorder           7/27: As above, presenting with chest pain and shortness of breath. Patient states that he started feeling chest pain earlier in the day and had worsened throughout the day. At its worst he rated his pain 8/10 that was located in the midsternum. The pain he describes as a sharp ache and a burning that worsens with deep breaths. The pain did not radiate to the neck, jaw, or back.  He was given IV morphine and dilaudid in which relieved his chest pain. At this time he states to still having the chest pain, but only worsening with deep breathing no pain at rest or with shallow breathing.               Vitals this morning show a heart rate of 86, blood pressure 131/76 satting 98% on 4 L nasal cannula (baseline).  On telemetry patient is sinus rhythm with heart rate 60s-70s.  Patient had echocardiogram completed in December 2024 for that showed an EF of 55± percent, left ventricular diastolic function was not evaluated due to inconsistent or technically suboptimal data.  Right ventricle is normal in size, right ventricle systolic function is normal, +1 mitral valve regurgitation, aortic sclerosis without significant stenosis.   With the patient's increased creatinine his home  medications of spironolactone, Jardiance, and losartan were placed on hold.  Admitting team has resumed his home medications of apixaban 5 mg twice daily for stroke risk reduction in the setting of atrial fibrillation, atorvastatin 40 mg daily, carvedilol 3.125 mg twice daily, and torsemide 50 mg daily. Will hold patients torsemide as well, with the increased creatinine.  At this time the patient is not having an acute coronary event; troponins are negative at 14 and 13. The chest discomfort that the patient describes is pleuric in nature as he describes his chest discomfort as a sharp ache and a burn when he takes a deep breath, the pain only worsens with deep breathing no pain at rest or with shallow breathing, and there are no ischemic changes noted on the EKG. With the patient's cardiac history and increased creatinine, will follow you for an additional 1 to 2 days.      7/28: As above. Patient reports chest pain continues and is worse with inspirations. He feels it is his lungs, not his heart. Suspect patient's chest pain is pleuritic. As mentioned above, EKG is nonischemic and serial troponins are flat. Blood pressures are well controlled with most recent reading of 129/70. Telemetry with sinus rhythm with PVCs, rates in the 60s. Labs today with sodium 135, potassium 4.6, creatinine 1.82, magnesium 2.59, WBC 12.2, hemoglobin 13.8. I agree with holding his losartan, spironolactone, and torsemide until his NITISH recovers. Continue apixaban 5 mg BID, atorvastatin 40 mg every day, carvedilol 3.125 mg BID. Pulmonology ordered a TTE; will await results.       I spent 35 minutes in the professional and overall care of this patient.      PAT Jacobo-CNP         [1] [Held by provider] allopurinol, 300 mg, oral, Daily  apixaban, 5 mg, oral, BID  atorvastatin, 40 mg, oral, Nightly  buPROPion XL, 150 mg, oral, Daily  carvedilol, 3.125 mg, oral, BID  [Held by provider] empagliflozin, 10 mg, oral,  Daily  ipratropium-albuteroL, 3 mL, nebulization, q6h  levETIRAcetam, 750 mg, oral, BID  [Held by provider] losartan, 50 mg, oral, Daily  [Held by provider] potassium chloride CR, 10 mEq, oral, Nightly  [Held by provider] spironolactone, 50 mg, oral, Daily  [Held by provider] torsemide, 50 mg, oral, Daily  traZODone, 100 mg, oral, Nightly  [2]    [3] PRN medications: acetaminophen **OR** acetaminophen **OR** acetaminophen, HYDROmorphone, ipratropium-albuteroL, nystatin, ondansetron **OR** ondansetron, oxyCODONE, oxyCODONE, polyethylene glycol

## 2025-07-28 NOTE — CONSULTS
Physical Therapy Daily Treatment Note     Name: Janna JOHNSON [facility] Number: [id number]    Therapy Diagnosis:   Encounter Diagnoses   Name Primary?    Painful cervical ROM Yes    Decreased range of motion of left shoulder     Decreased functional mobility and endurance      Physician: Devyn Dover MD    Visit Date: 2/22/2022     Physician Orders: PT Eval and Treat   Medical Diagnosis from Referral:   M54.12 (ICD-10-CM) - Cervical radiculopathy   M50.30 (ICD-10-CM) - DDD (degenerative disc disease), cervical   Evaluation Date: 2/15/2022  Authorization Period Expiration: 2/7/23  Plan of Care Expiration: 4/26/22  Visit # / Visits authorized: 2/ 1  Foto: 1/3  Progress Note Due Date:    Precautions: Standard,HTN, sarcodosis of the liver    Time In: 9:25 (pt late) am  Time Out: 10:10 am  Total Time: 45 minutes    SUBJECTIVE     Today, pt reports: she is less sore in her shoulders and neck but had trouble with her neck stretch at home.  She was compliant with home exercise program.  Response to previous treatment: less sore  Functional change: none yet    Pre-Treatment Pain: 5/10  Post-Treatment Pain: 4/10  Location: shoulders, neck  TREATMENT     Janna received therapeutic exercises to develop strength, endurance, ROM, flexibility, posture and core stabilization for 15 minutes including:    Cervical retraction 2x 10 supine  Cervical retractin with chin tuck 2x10 with manual feed back  Prone shoulder extension 2x 10 per arm  Prone shoulder horizontal abduction 2x 10 with scapular manual feed back to teach posterior tipping and upward rotation   Prone W 2x 10 with manual assistance to direct her scapular mechanics and try to reduce levator function  Reaching for upward scapular rotation with shoulder abduction 2x 8 per arm      Janna received the following manual therapy techniques: Manual traction, Myofacial release and Soft tissue Mobilization were applied to the: L infraspinatus, supraspinatus, upper trap,  "Pulmonary Consultation Note   Subjective    Kapil MICHAEL Child is a 70 y.o. year old male patient known with chronic hypoxic respiratory failure, CAD  admitted on 7/26/2025 with following COPD Exacerbation    History of Present Illness:  Mr. Brandt is a 70-year-old male with history of CAD s/p PCI (6/2023), paroxysmal A-fib and DVT (pt also notes prior PE but I cannot see this study on review going back to available imaging to 2013) and DVT on apixaban, HFpEF with recent exacerbation (discharged 6//25), chronic hypoxemic respiratory failure attributed to COPD on 4 L of home oxygen, CKD 3A who presented to the ER with chest pain.    Of note he was recently admitted at [facility] for chest pain and treated for decompensated diastolic heart failure and chest pain. He states this pain is different than the pain he had at [facility].    Pulmonary is consulted for COPD exacerbation.  He notes that this does not feel like prior COPD exacerbations to him.  At home he is on Advair and Spiriva.  He has been having worsening shortness of breath since his air conditioning went out and Saturday (2 days prior to admission) and notes that he has been eating out \"too much\" and \"eating too much salt\".  He notes that his legs were swelling over the last couple days which have improved with elevation.  He notes that he feels short of breath when laying flat and has been since propping himself up about 30 degrees in order to breathe appropriately.    He denies any changes in sputum production from baseline. He notes beingdiagnosed with asthma a about 6 years ago and  was placed on oxygen about the same time.      Past Medical History  He has a past medical history of Anxiety, Chronic diastolic heart failure, Chronic respiratory failure with hypoxia, Coronary artery disease, Depression, DVT (deep venous thrombosis) (Multi), Gout, History of pulmonary embolus (PE), Hypertension, MI (myocardial infarction) (Multi), Obstructive sleep apnea, Paroxysmal atrial " "fibrillation (Multi), Seizure (Multi), and Stroke (Multi).    Surgical History  He has a past surgical history that includes MR angio head wo IV contrast (07/07/2015); MR angio head wo IV contrast (07/09/2015); Total knee arthroplasty (Right); Cholecystectomy; and Colectomy partial / total.     Social History  He reports that he has quit smoking. His smoking use included cigarettes. He has never used smokeless tobacco. He reports that he does not currently use alcohol. He reports that he does not use drugs.  8 year smking hx - quit 4 years after college    Family History  Family History[1]     Allergies  Cephalosporins, Codeine, Penicillins, Tramadol, and Adhesive tape-silicones    Review of Systems  No jaw pain no radiation of chest pain no arm pain/tingling  (+) orthopnea  (+) Chest pain  (?) Reflux (pt unsure)  Rest as per HPI      Meds    Scheduled medications  Scheduled Medications[2]  Continuous medications  Continuous Medications[3]  PRN medications  PRN Medications[4]     Objective      Last Recorded Vitals  /70 (BP Location: Left arm, Patient Position: Lying)   Pulse 64   Temp 36.7 °C (98.1 °F) (Oral)   Resp 18   Wt 149 kg (328 lb)   SpO2 97%     Blood pressure 144/70, pulse 64, temperature 36.7 °C (98.1 °F), temperature source Oral, resp. rate 18, height 1.905 m (6' 3\"), weight 149 kg (328 lb), SpO2 97%.   Physical Exam   Gen: No acute distress, conversational, laying in bed at 30 degrees  HENT: MMM eyes non-icteric  Pulm: distant, no wheezing, no rhonchi, no accessory muscle use or conversational dyspnea  CV: RRR no audible m/r/g; chest pain in center of chest is not reproducible   Abdomen: +BS  Ext: +discoloration c/w chronic venous stasis   Psych: Appropriate for situation      Intake/Output Summary (Last 24 hours) at 7/28/2025 1107  Last data filed at 7/28/2025 0700  Gross per 24 hour   Intake 570 ml   Output --   Net 570 ml     Labs:   Results from last 72 hours   Lab Units 07/28/25  0545 " rhomboids and levator scapulae B with PT using scapular mobs to increase depression, upward rotation and posterior tipping for 30 minutes      Home Exercises Provided and Patient Education Provided      Education/Self-Care provided: (during there)     Patient educated on the importance of improved core and upper and lower extremity strength in order to improve alignment of the spine and upper and lower extremities with static positions and dynamic movement.    Patient educated on the importance of strong core and lower extremity musculature in order to improve both static and dynamic balance, improve gait mechanics, reduce fall risk and improve household and community mobility.       Written Home Exercises Provided: Patient instructed to cont prior HEP.  Exercises were reviewed and Janna was able to demonstrate them prior to the end of the session.  Janna demonstrated fair  understanding of the education provided.     See EMR under Patient Instructions for exercises provided prior visit.    ASSESSMENT   Pt tolerated therexbut had very poor body awareness and had difficulty initiating scapular motion so PT used a lot of manual therapy to help mobilize the scapula and teach posterior tipping and depression and attempted uprward rotation but she just couldn't follow that task yet. PT to continue working on this. Pt tolerated exercise well with reports of increased fatigue and L buttock discomfort from trying to compensate for poor scapular motion but no increased pain. Pt demonstrated good understanding of exercises and required minimal cueing to maintain proper form.      Janna Is progressing well towards her goals.   Pt prognosis is Good.     Pt will continue to benefit from skilled outpatient physical therapy to address the deficits listed in the problem list box on initial evaluation, provide pt/family education and to maximize pt's level of independence in the home and community environment.     Pt's spiritual,  "07/27/25  0608 07/26/25 1959   SODIUM mmol/L 135* 135* 135*   POTASSIUM mmol/L 4.6 4.6 4.4   CHLORIDE mmol/L 100 98 99   CO2 mmol/L 29 29 29   BUN mg/dL 50* 50* 49*   CREATININE mg/dL 1.82* 1.96* 2.01*   GLUCOSE mg/dL 103* 192* 97   CALCIUM mg/dL 8.5* 8.7 8.9   ANION GAP mmol/L 11 13 11   EGFR mL/min/1.73m*2 39* 36* 35*      Results from last 72 hours   Lab Units 07/28/25  0545 07/27/25  0608 07/26/25 1959   WBC AUTO x10*3/uL 12.2* 6.4 6.8   HEMOGLOBIN g/dL 13.8 14.8 14.1   HEMATOCRIT % 45.3 46.3 45.2   PLATELETS AUTO x10*3/uL 195 192 194   NEUTROS PCT AUTO %  --   --  75.2   LYMPHS PCT AUTO %  --   --  9.6   MONOS PCT AUTO %  --   --  10.7   EOS PCT AUTO %  --   --  3.8          Results from last 72 hours   Lab Units 07/26/25 1959   POCT PH, VENOUS pH 7.37   POCT PCO2, VENOUS mm Hg 55*   POCT PO2, VENOUS mm Hg 49*      Micro/ID:   No results found for: \"URINECULTURE\", \"BLOODCULT\", \"CSFCULTSMEAR\"  Summary of key imaging results from the last 24 hours  7/26/25 CXR - personal review shows widening of the mediastinum c/w patients known hx of ascending aortic aneurysm; pulmonary edema without pleural effusion    Impression   Kapil MICHAEL Child is a 70 y.o. year old male patient is being seen by the pulmonary service for COPD evaluation  I do not think this patient is in an acute exacerbation of COPD and rather a CHF exacerbation  Of note, PFTs are consistent with a restrictive process (preserved ratio, decrease in FEV1 and FVC and TLC)  process and air trapping (inc RV/TLC ratio) - this could be PRISM in the setting of obesity; FeNO noted to be elevated to 46 ppb in the past (2019 per chart review)  Chronic hypoxemic respiratory - patient on home 4L of oxygen  Chest Pain  - nonradiating and non-reproducible     Recommendations   As follows:  Obtain ECHO ( I placed this order)  I stopped IV steroids given low suspicion  [facility]  Will need outpatient pulmonary follow-up with HRCT and full PFTs for " cultural and educational needs considered and pt agreeable to plan of care and goals.     Anticipated barriers to physical therapy: obesity, multiple joints involved    Goals:   Short Term Goals: In 4 weeks   1.Pt to be educated on HEP.  2.Patient to increase GH ROM from 160 degrees of flexion    3.Patient to increase strength to 3+/5 in the mid and lower traps  4.Patient to have pain less than 4/10 or better at all times.  5.Patient to improve score on the FOTO by 10%.  6. Pt to be educated on postural and body mechanics awareness.  7. Deep neck flexor static hold tolerance 10 seconds or better.     Long Term Goals: In 10 weeks 4/26/22  1. Patient to improve score on the FOTO to 49%.  2. Patient to demo increase in UE strength to shoulder external rotation to 4+/5  3. Patient to have decreased pain to 2/10 at all times.  4. Patient to demo increase cervical flexion to 55 and extension to 65 degrees or better  5. Patient to perform daily activities including lifting overhead up to 10 pounds and dead lifting up to 35 # to simulate home maintenance tasks without limitation.    PLAN   Plan of care Certification: 2/15/2022 to 4/26/22.     Outpatient Physical Therapy 2 times weekly for 10 weeks to include the following interventions: Cervical/Lumbar Traction, Electrical Stimulation IFC, NMES, Gait Training, Manual Therapy, Moist Heat/ Ice, Neuromuscular Re-ed, Patient Education, Self Care, Therapeutic Activities, Therapeutic Exercise and DN. Continue Plan of Care (POC) and progress per patient tolerance.    Armida Michael, PT, CIDN, SFMA   better evaluation of restriction seen on prior PFTs  I ordered 1 time jones of Maalox for eval of ? GERD component though suspicion is low  Bumex 2mg IV  Discussed above with primary team    Katey Helton MD   07/28/25 at 11:07 AM     -Only the Medical problems listed under impression were addressed today.   -Please contact primary team for all other concerns and medical problem  -Thank you for your consult     Disclaimer: Documentation completed with the information available at the time of input. Parts of this note may have been scribed or generated using voice dictation software, Dragon.  Homophonic errors may exist.  Please contact me directly if clarification is needed. The times in the chart may not be reflective of actual patient care times, interventions, or procedures. Documentation occurs after the physical care of the patient.            [1]   Family History  Problem Relation Name Age of Onset    Pancreatic cancer Father      Hypertension Father      Hypertension Sister      Hypertension Brother     [2] [Held by provider] allopurinol, 300 mg, oral, Daily  apixaban, 5 mg, oral, BID  atorvastatin, 40 mg, oral, Nightly  buPROPion XL, 150 mg, oral, Daily  carvedilol, 3.125 mg, oral, BID  [Held by provider] empagliflozin, 10 mg, oral, Daily  formoterol, 20 mcg, nebulization, BID  ipratropium-albuteroL, 3 mL, nebulization, q6h  levETIRAcetam, 750 mg, oral, BID  [Held by provider] losartan, 50 mg, oral, Daily  methylPREDNISolone sodium succinate (PF), 40 mg, intravenous, BID  [Held by provider] potassium chloride CR, 10 mEq, oral, Nightly  [Held by provider] spironolactone, 50 mg, oral, Daily  [Held by provider] torsemide, 50 mg, oral, Daily  traZODone, 100 mg, oral, Nightly  [3]    [4] PRN medications: acetaminophen **OR** acetaminophen **OR** acetaminophen, ipratropium-albuteroL, nystatin, ondansetron **OR** ondansetron, oxyCODONE, oxyCODONE, polyethylene glycol

## 2025-07-28 NOTE — PROGRESS NOTES
Kapil MICHAEL Child is a 70 y.o. male on day 0 of admission presenting with Chest pain.      Subjective     Patient seen resting comfortably.  He complains of persistent chest pain when taking deep breaths.  No shortness of breath.    Chart review, patient has been having ongoing chest pain for years.         Objective     Last Recorded Vitals  /70 (BP Location: Left arm, Patient Position: Lying)   Pulse 64   Temp 36.7 °C (98.1 °F) (Oral)   Resp 18   Wt 149 kg (328 lb)   SpO2 97%   Intake/Output last 3 Shifts:    Intake/Output Summary (Last 24 hours) at 7/28/2025 1021  Last data filed at 7/28/2025 0700  Gross per 24 hour   Intake 570 ml   Output --   Net 570 ml       Admission Weight  Weight: 149 kg (328 lb) (07/26/25 1907)    Daily Weight  07/26/25 : 149 kg (328 lb)    Image Results  ECG 12 lead  Sinus rhythm with 1st degree AV block  Left bundle branch block  Abnormal ECG  When compared with ECG of 23-MAY-2025 20:33,  Premature ventricular complexes are no longer Present  Left bundle branch block is now Present      Physical Exam    General: Elderly male, morbidly obese  Eyes: Clear sclera  Neck: Increased circumference grossly.  Cardio: Regular rate rhythm  Respiratory: Bilateral somewhat tight breath sounds with expiratory wheezing.  Extremities: Trace bilateral lower extremity edema  Psychiatric: Currently appropriate mood and affect  Neuro: Alert and oriented x 3.  Musculoskeletal: No bony deformity.    Assessment and plan     Chest pain  -Note ongoing issue, appears pleuritic in nature  - Appreciate input from cardiology, and they suspect likely pleuritic also  - Will decrease pain medications to oxycodone, anticipate discharging on very short prescription of tramadol  - I do see there is some concern the past about drug-seeking behavior, and the OARRS report is somewhat concerning to me -he will need to follow-up with pain management if there is ongoing continued pain.    COPD exacerbation  Will  change Solu-Medrol to 40 mg IV twice a day  Schedule DuoNeb, as needed DuoNeb.  Pulmonology consult    History of coronary artery disease  Continue with current medication.  Monitor close    Acute kidney injury  Creatinine is trending down though not to baseline  - Continue to hold spironolactone, torsemide, and Jardiance.   - Low threshold for nephrology consult.    Atrial fibrillation.  Rate controlled  Patient is on blood thinner.    Congestive heart failure  -Diastolic dysfunction  - Holding diuretics as above.    Seizure disorder  Continue with current medication    Chronic respiratory failure with hypoxia  Patient is on 4 L oxygen -stable.    Disposition: To be seen by pulmonology today.  Will increase steroids and scheduled DuoNeb treatments.  If stable, patient can be discharged to home tomorrow.  External home health care referral placed.    Jacob Mustafa,

## 2025-07-28 NOTE — CARE PLAN
The patient's goals for the shift include      The clinical goals for the shift include manage chest pain    Over the shift, the patient did not make progress toward the following goals. Barriers to progression include chest pain unresolved. Recommendations to address these barriers include medicate for chest pain as prescribed.

## 2025-07-28 NOTE — CARE PLAN
The patient's goals for the shift include      The clinical goals for the shift include manage chest pain    Over the shift, the patient did not make progress toward the following goals. Barriers to progression include unresolved chest pain. Recommendations to address these barriers include medicate for chest pain as prescribed. .

## 2025-07-28 NOTE — PROGRESS NOTES
Physical Therapy    Physical Therapy Treatment    Patient Name: Kapil Luna  MRN: [id number]  Department: [address]  Room: [address] [place] [address]  Today's Date: 7/28/2025  Time Calculation  Start Time: 1040  Stop Time: 1103  Time Calculation (min): 23 min         Assessment/Plan   PT Assessment  Barriers to Discharge Home: No anticipated barriers  End of Session Communication: Bedside nurse  Assessment Comment: Pt continues to present with mild strength, balance and endurance deficits. Pt would continued to benefit from skilled therapy services.  End of Session Patient Position: Bed, 2 rail up, Alarm off, not on at start of session  PT Plan  Inpatient/Swing Bed or Outpatient: Inpatient  PT Plan  Treatment/Interventions: Transfer training, Gait training, Stair training, Balance training, Neuromuscular re-education, Strengthening, Endurance training, Therapeutic exercise, Therapeutic activity  PT Plan: Ongoing PT  PT Frequency: 4 times per week (during this acute inpatient hospitalization)  PT Discharge Recommendations: Low intensity level of continued care  Equipment Recommended upon Discharge: Wheeled walker  PT Recommended Transfer Status: Stand by assist, Assistive device  PT - OK to Discharge: Yes    PT Visit Info:  PT Received On: 07/28/25     General Visit Information:   General  Prior to Session Communication: Bedside nurse  Patient Position Received: Bed, 2 rail up, Alarm off, not on at start of session  General Comment: Cleared by nursing to be seen for therapy, pt agreeable with tx, supine in bed upon arrival.    Subjective   Precautions:  Precautions  Medical Precautions: Fall precautions, Oxygen therapy device and L/min, Seizure precautions (4L oxygen)    Objective   Pain:  Pain Assessment  Pain Assessment: 0-10  0-10 (Numeric) Pain Score: 6  Pain Type: Acute pain  Pain Location: Chest  Pain Orientation: Mid  Pain Interventions: Repositioned  Response to Interventions: No change in pain (RN notified and in to give  pain medication)    Cognition:  Cognition  Overall Cognitive Status: Within Functional Limits  Orientation Level: Oriented X4  Following Commands: Follows all commands and directions without difficulty     Postural Control:  Static Sitting Balance  Static Sitting-Balance Support: Feet supported  Static Sitting-Level of Assistance: Independent  Static Sitting-Comment/Number of Minutes: Good seated static balance  Static Standing Balance  Static Standing-Balance Support: Bilateral upper extremity supported  Static Standing-Level of Assistance: Close supervision  Static Standing-Comment/Number of Minutes: Good static standing balance with bilateral UE support on walker.    Treatments:  Therapeutic Exercise  Therapeutic Exercise Performed: No    Bed Mobility  Bed Mobility: Yes  Bed Mobility 1  Bed Mobility 1: Supine to sitting, Sitting to supine  Level of Assistance 1: Independent  Bed Mobility Comments 1: HOB flat without use of bed rails    Ambulation/Gait Training  Ambulation/Gait Training Performed: Yes  Ambulation/Gait Training 1  Surface 1: Level tile  Device 1: Rolling walker  Assistance 1: Close supervision  Comments/Distance (ft) 1: 100'x2 with wheeled walker, no SOB noted.    Transfers  Transfer: Yes  Transfer 1  Transfer From 1: Sit to  Transfer to 1: Stand  Technique 1: Sit to stand, Stand to sit  Transfer Device 1: Walker  Transfer Level of Assistance 1: Modified independent  Trials/Comments 1: Good hand placement, good eccentric control in sitting    Outcome Measures:  [facility] Basic Mobility  Turning from your back to your side while in a flat bed without using bedrails: None  Moving from lying on your back to sitting on the side of a flat bed without using bedrails: None  Moving to and from bed to chair (including a wheelchair): A little  Standing up from a chair using your arms (e.g. wheelchair or bedside chair): A little  To walk in hospital room: A little  Climbing 3-5 steps with railing: A little  Basic  Mobility - Total Score: 20    Education Documentation  Mobility Training, taught by Alexandria Aguilar PTA at 7/28/2025 11:05 AM.  Learner: Patient  Readiness: Acceptance  Method: Explanation  Response: Verbalizes Understanding    Education Comments  07/28/25 1105 Alexandria Aguilar PTA  Patient educated on the importance of mobility and participation with therapy. Educated on safety and use of call light for assistance.           Encounter Problems       Encounter Problems (Active)       PT STG Problem       Patient will transfer sit to stand and stand to sit with independent assist to facilitate mobility. (Progressing)       Start:  07/27/25    Expected End:  08/27/25            Patient will amb 150 feet rolling walker device including two turns on even surface with independent assist to facilitate safe mobility.   (Progressing)       Start:  07/27/25    Expected End:  08/27/25            Patient will negotiate 12 stairs with one rail(s) and independent} assist with no device for in home and community. (Not Progressing)       Start:  07/27/25    Expected End:  08/27/25

## 2025-07-29 ENCOUNTER — PHARMACY VISIT (OUTPATIENT)
Dept: PHARMACY | Facility: CLINIC | Age: 70
End: 2025-07-29
Payer: COMMERCIAL

## 2025-07-29 VITALS
SYSTOLIC BLOOD PRESSURE: 135 MMHG | DIASTOLIC BLOOD PRESSURE: 73 MMHG | HEIGHT: 75 IN | RESPIRATION RATE: 15 BRPM | HEART RATE: 66 BPM | TEMPERATURE: 97.7 F | WEIGHT: 315 LBS | BODY MASS INDEX: 39.17 KG/M2 | OXYGEN SATURATION: 98 %

## 2025-07-29 LAB
ANION GAP SERPL CALCULATED.3IONS-SCNC: 12 MMOL/L (ref 10–20)
ATRIAL RATE: 76 BPM
BUN SERPL-MCNC: 49 MG/DL (ref 6–23)
CALCIUM SERPL-MCNC: 8.8 MG/DL (ref 8.6–10.3)
CHLORIDE SERPL-SCNC: 102 MMOL/L (ref 98–107)
CO2 SERPL-SCNC: 31 MMOL/L (ref 21–32)
CREAT SERPL-MCNC: 1.39 MG/DL (ref 0.5–1.3)
EGFRCR SERPLBLD CKD-EPI 2021: 54 ML/MIN/1.73M*2
ERYTHROCYTE [DISTWIDTH] IN BLOOD BY AUTOMATED COUNT: 16.1 % (ref 11.5–14.5)
GLUCOSE SERPL-MCNC: 90 MG/DL (ref 74–99)
HCT VFR BLD AUTO: 45.2 % (ref 41–52)
HGB BLD-MCNC: 14.3 G/DL (ref 13.5–17.5)
MCH RBC QN AUTO: 27.5 PG (ref 26–34)
MCHC RBC AUTO-ENTMCNC: 31.6 G/DL (ref 32–36)
MCV RBC AUTO: 87 FL (ref 80–100)
NRBC BLD-RTO: 0 /100 WBCS (ref 0–0)
P AXIS: -8 DEGREES
P OFFSET: 180 MS
P ONSET: 106 MS
PLATELET # BLD AUTO: 199 X10*3/UL (ref 150–450)
POTASSIUM SERPL-SCNC: 4.5 MMOL/L (ref 3.5–5.3)
PR INTERVAL: 222 MS
Q ONSET: 217 MS
QRS COUNT: 12 BEATS
QRS DURATION: 142 MS
QT INTERVAL: 470 MS
QTC CALCULATION(BAZETT): 528 MS
QTC FREDERICIA: 508 MS
R AXIS: -53 DEGREES
RBC # BLD AUTO: 5.2 X10*6/UL (ref 4.5–5.9)
SODIUM SERPL-SCNC: 140 MMOL/L (ref 136–145)
T AXIS: 86 DEGREES
T OFFSET: 452 MS
VENTRICULAR RATE: 76 BPM
WBC # BLD AUTO: 10.2 X10*3/UL (ref 4.4–11.3)

## 2025-07-29 PROCEDURE — 2500000002 HC RX 250 W HCPCS SELF ADMINISTERED DRUGS (ALT 637 FOR MEDICARE OP, ALT 636 FOR OP/ED): Performed by: INTERNAL MEDICINE

## 2025-07-29 PROCEDURE — 94640 AIRWAY INHALATION TREATMENT: CPT

## 2025-07-29 PROCEDURE — 36415 COLL VENOUS BLD VENIPUNCTURE: CPT | Performed by: INTERNAL MEDICINE

## 2025-07-29 PROCEDURE — RXMED WILLOW AMBULATORY MEDICATION CHARGE

## 2025-07-29 PROCEDURE — 96376 TX/PRO/DX INJ SAME DRUG ADON: CPT | Mod: 59

## 2025-07-29 PROCEDURE — 99239 HOSP IP/OBS DSCHRG MGMT >30: CPT | Performed by: INTERNAL MEDICINE

## 2025-07-29 PROCEDURE — 85027 COMPLETE CBC AUTOMATED: CPT | Performed by: INTERNAL MEDICINE

## 2025-07-29 PROCEDURE — 2500000001 HC RX 250 WO HCPCS SELF ADMINISTERED DRUGS (ALT 637 FOR MEDICARE OP): Performed by: INTERNAL MEDICINE

## 2025-07-29 PROCEDURE — 97116 GAIT TRAINING THERAPY: CPT | Mod: GP,CQ

## 2025-07-29 PROCEDURE — 99232 SBSQ HOSP IP/OBS MODERATE 35: CPT | Performed by: PHYSICIAN ASSISTANT

## 2025-07-29 PROCEDURE — 2500000004 HC RX 250 GENERAL PHARMACY W/ HCPCS (ALT 636 FOR OP/ED): Mod: JW | Performed by: INTERNAL MEDICINE

## 2025-07-29 PROCEDURE — 99233 SBSQ HOSP IP/OBS HIGH 50: CPT | Performed by: STUDENT IN AN ORGANIZED HEALTH CARE EDUCATION/TRAINING PROGRAM

## 2025-07-29 PROCEDURE — G0378 HOSPITAL OBSERVATION PER HR: HCPCS

## 2025-07-29 PROCEDURE — 2500000004 HC RX 250 GENERAL PHARMACY W/ HCPCS (ALT 636 FOR OP/ED): Mod: JZ | Performed by: STUDENT IN AN ORGANIZED HEALTH CARE EDUCATION/TRAINING PROGRAM

## 2025-07-29 PROCEDURE — 9420000001 HC RT PATIENT EDUCATION 5 MIN

## 2025-07-29 PROCEDURE — 80048 BASIC METABOLIC PNL TOTAL CA: CPT | Performed by: INTERNAL MEDICINE

## 2025-07-29 RX ORDER — PREDNISONE 20 MG/1
40 TABLET ORAL DAILY
Qty: 10 TABLET | Refills: 0 | Status: SHIPPED | OUTPATIENT
Start: 2025-07-29 | End: 2025-08-03 | Stop reason: HOSPADM

## 2025-07-29 RX ORDER — OXYCODONE HYDROCHLORIDE 10 MG/1
10 TABLET ORAL EVERY 6 HOURS PRN
Qty: 12 TABLET | Refills: 0 | Status: SHIPPED | OUTPATIENT
Start: 2025-07-29 | End: 2025-08-03 | Stop reason: HOSPADM

## 2025-07-29 RX ORDER — IPRATROPIUM BROMIDE AND ALBUTEROL SULFATE 2.5; .5 MG/3ML; MG/3ML
3 SOLUTION RESPIRATORY (INHALATION) EVERY 2 HOUR PRN
Status: DISCONTINUED | OUTPATIENT
Start: 2025-07-29 | End: 2025-07-29 | Stop reason: HOSPADM

## 2025-07-29 RX ORDER — FORMOTEROL FUMARATE 20 UG/2ML
20 SOLUTION RESPIRATORY (INHALATION)
Status: DISCONTINUED | OUTPATIENT
Start: 2025-07-29 | End: 2025-07-29 | Stop reason: HOSPADM

## 2025-07-29 RX ORDER — BUDESONIDE 0.5 MG/2ML
0.25 INHALANT ORAL
Status: DISCONTINUED | OUTPATIENT
Start: 2025-07-29 | End: 2025-07-29 | Stop reason: HOSPADM

## 2025-07-29 RX ORDER — BUMETANIDE 0.25 MG/ML
2 INJECTION, SOLUTION INTRAMUSCULAR; INTRAVENOUS ONCE
Status: COMPLETED | OUTPATIENT
Start: 2025-07-29 | End: 2025-07-29

## 2025-07-29 RX ADMIN — OXYCODONE HYDROCHLORIDE 10 MG: 5 TABLET ORAL at 00:32

## 2025-07-29 RX ADMIN — HYDROMORPHONE HYDROCHLORIDE 0.2 MG: 0.5 INJECTION, SOLUTION INTRAMUSCULAR; INTRAVENOUS; SUBCUTANEOUS at 05:17

## 2025-07-29 RX ADMIN — IPRATROPIUM BROMIDE AND ALBUTEROL SULFATE 3 ML: 2.5; .5 SOLUTION RESPIRATORY (INHALATION) at 12:10

## 2025-07-29 RX ADMIN — APIXABAN 5 MG: 5 TABLET, FILM COATED ORAL at 08:13

## 2025-07-29 RX ADMIN — BUMETANIDE 2 MG: 0.25 INJECTION INTRAMUSCULAR; INTRAVENOUS at 13:12

## 2025-07-29 RX ADMIN — CARVEDILOL 3.12 MG: 3.12 TABLET, FILM COATED ORAL at 08:13

## 2025-07-29 RX ADMIN — HYDROMORPHONE HYDROCHLORIDE 0.2 MG: 0.5 INJECTION, SOLUTION INTRAMUSCULAR; INTRAVENOUS; SUBCUTANEOUS at 09:27

## 2025-07-29 RX ADMIN — IPRATROPIUM BROMIDE AND ALBUTEROL SULFATE 3 ML: 2.5; .5 SOLUTION RESPIRATORY (INHALATION) at 07:16

## 2025-07-29 RX ADMIN — BUPROPION HYDROCHLORIDE 150 MG: 150 TABLET, EXTENDED RELEASE ORAL at 08:13

## 2025-07-29 RX ADMIN — OXYCODONE HYDROCHLORIDE 10 MG: 5 TABLET ORAL at 06:03

## 2025-07-29 RX ADMIN — LEVETIRACETAM 750 MG: 250 TABLET, FILM COATED ORAL at 08:13

## 2025-07-29 ASSESSMENT — PAIN SCALES - GENERAL
PAINLEVEL_OUTOF10: 8
PAINLEVEL_OUTOF10: 5 - MODERATE PAIN
PAINLEVEL_OUTOF10: 8
PAINLEVEL_OUTOF10: 6
PAINLEVEL_OUTOF10: 10 - WORST POSSIBLE PAIN
PAINLEVEL_OUTOF10: 4
PAINLEVEL_OUTOF10: 10 - WORST POSSIBLE PAIN
PAINLEVEL_OUTOF10: 4
PAINLEVEL_OUTOF10: 5 - MODERATE PAIN

## 2025-07-29 ASSESSMENT — COGNITIVE AND FUNCTIONAL STATUS - GENERAL
WALKING IN HOSPITAL ROOM: A LITTLE
MOVING TO AND FROM BED TO CHAIR: A LITTLE
STANDING UP FROM CHAIR USING ARMS: A LITTLE
CLIMB 3 TO 5 STEPS WITH RAILING: A LITTLE
DAILY ACTIVITIY SCORE: 24
MOBILITY SCORE: 20
MOBILITY SCORE: 24

## 2025-07-29 ASSESSMENT — PAIN - FUNCTIONAL ASSESSMENT
PAIN_FUNCTIONAL_ASSESSMENT: 0-10

## 2025-07-29 ASSESSMENT — PAIN DESCRIPTION - DESCRIPTORS
DESCRIPTORS: ACHING

## 2025-07-29 ASSESSMENT — PAIN DESCRIPTION - LOCATION
LOCATION: CHEST

## 2025-07-29 ASSESSMENT — PAIN DESCRIPTION - ORIENTATION
ORIENTATION: UPPER

## 2025-07-29 NOTE — PROGRESS NOTES
"Pulmonary Progress Note   Subjective      Feeling better after diuresis. Breathing feels closer to baseline. Had questions about salt intake and diet contributing to his current disease process.          Meds    Scheduled medications  Scheduled Medications[1]  Continuous medications  Continuous Medications[2]  PRN medications  PRN Medications[3]     Objective      Last Recorded Vitals  /73 (BP Location: Left arm, Patient Position: Lying)   Pulse 66   Temp 36.3 °C (97.3 °F) (Axillary)   Resp 18   Wt 149 kg (328 lb)   SpO2 97%     Blood pressure 131/73, pulse 66, temperature 36.3 °C (97.3 °F), temperature source Axillary, resp. rate 18, height 1.905 m (6' 3\"), weight 149 kg (328 lb), SpO2 97%.   Physical Exam   Gen: No acute distress, conversational, laying in bed at 30 degrees  HENT: MMM eyes non-icteric  Pulm: distant, no wheezing, rales/rhonchi or conversational dyspnea  CV: RRR no audible m/r/g; chest pain in center of chest is not reproducible   Abdomen: +BS  Ext: +discoloration c/w chronic venous stasis   Psych: Appropriate for situation       Intake/Output Summary (Last 24 hours) at 7/29/2025 1240  Last data filed at 7/29/2025 1214  Gross per 24 hour   Intake 712 ml   Output --   Net 712 ml     Labs:   Results from last 72 hours   Lab Units 07/29/25 0533 07/28/25  0545 07/27/25  0608   SODIUM mmol/L 140 135* 135*   POTASSIUM mmol/L 4.5 4.6 4.6   CHLORIDE mmol/L 102 100 98   CO2 mmol/L 31 29 29   BUN mg/dL 49* 50* 50*   CREATININE mg/dL 1.39* 1.82* 1.96*   GLUCOSE mg/dL 90 103* 192*   CALCIUM mg/dL 8.8 8.5* 8.7   ANION GAP mmol/L 12 11 13   EGFR mL/min/1.73m*2 54* 39* 36*   PHOSPHORUS mg/dL  --  4.7  --       Results from last 72 hours   Lab Units 07/29/25  0533 07/28/25  0545 07/27/25  0608 07/26/25  1959   WBC AUTO x10*3/uL 10.2 12.2* 6.4 6.8   HEMOGLOBIN g/dL 14.3 13.8 14.8 14.1   HEMATOCRIT % 45.2 45.3 46.3 45.2   PLATELETS AUTO x10*3/uL 199 195 192 194   NEUTROS PCT AUTO %  --   --   --  75.2 " "  LYMPHS PCT AUTO %  --   --   --  9.6   MONOS PCT AUTO %  --   --   --  10.7   EOS PCT AUTO %  --   --   --  3.8          Results from last 72 hours   Lab Units 07/26/25 1959   POCT PH, VENOUS pH 7.37   POCT PCO2, VENOUS mm Hg 55*   POCT PO2, VENOUS mm Hg 49*      Micro/ID:   No results found for: \"URINECULTURE\", \"BLOODCULT\", \"CSFCULTSMEAR\"  Summary of key imaging results from the last 24 hours  No new    Impression   Kapil MICHAEL Child is a 70 y.o. year old male patient is being seen by the pulmonary service for  COPD evaluation  I do not think this patient is in an acute exacerbation of COPD and rather a CHF exacerbation  Of note, PFTs are consistent with a restrictive process (preserved ratio, decrease in FEV1 and FVC and TLC)  process and air trapping (inc RV/TLC ratio) - this could be PRISM in the setting of obesity; FeNO noted to be elevated to 46 ppb in the past (2019 per chart review)  Chronic hypoxemic respiratory - patient on home 4L of oxygen  Chest Pain  - nonradiating and non-reproducible   NITISH - believed cardiorenal mei given improvement with diuresis       Recommendations   As follows:  Bumex 2mg IV today   Electrolyte replacement and monitoring per primary team  Continue home nebulizers (ICS/LABA) - pt probably has sathma - low supsicoin for COPD  GDMT management per Cardiology/primary  Outpatient pulm follow-up  Encouraged low salt diet   Pulmonary will sign-off - please re-consult with any questions/concerns    Katey Helton MD   07/29/25 at 12:40 PM     -Only the Medical problems listed under impression were addressed today.   -Please contact primary team for all other concerns and medical problem  -Thank you for your consult     Disclaimer: Documentation completed with the information available at the time of input. Parts of this note may have been scribed or generated using voice dictation software, Dragon.  Homophonic errors may exist.  Please contact me directly if clarification is needed. " The times in the chart may not be reflective of actual patient care times, interventions, or procedures. Documentation occurs after the physical care of the patient.          [1] allopurinol, 300 mg, oral, Daily  apixaban, 5 mg, oral, BID  atorvastatin, 40 mg, oral, Nightly  buPROPion XL, 150 mg, oral, Daily  carvedilol, 3.125 mg, oral, BID  [Held by provider] empagliflozin, 10 mg, oral, Daily  ipratropium-albuteroL, 3 mL, nebulization, TID  levETIRAcetam, 750 mg, oral, BID  [Held by provider] losartan, 50 mg, oral, Daily  [Held by provider] potassium chloride CR, 10 mEq, oral, Nightly  [Held by provider] spironolactone, 50 mg, oral, Daily  torsemide, 50 mg, oral, Daily  traZODone, 100 mg, oral, Nightly  [2]    [3] PRN medications: acetaminophen **OR** acetaminophen **OR** acetaminophen, HYDROmorphone, ipratropium-albuteroL, nystatin, ondansetron **OR** ondansetron, oxyCODONE, oxyCODONE, polyethylene glycol

## 2025-07-29 NOTE — PROGRESS NOTES
Physical Therapy    Physical Therapy Treatment    Patient Name: Kapil Luna  MRN: [id number]  Department: [address]  Room: [address] [place] [address]  Today's Date: 7/29/2025  Time Calculation  Start Time: 1122  Stop Time: 1140  Time Calculation (min): 18 min         Assessment/Plan   PT Assessment  End of Session Communication: Bedside nurse  Assessment Comment: Pt continues to present with mobility and strength deficits, pt would benefit from continued skilled therapy services to increase strength and improve functional mobility.  End of Session Patient Position: Bed, 2 rail up, Alarm off, not on at start of session  PT Plan  Inpatient/Swing Bed or Outpatient: Inpatient  PT Plan  Treatment/Interventions: Transfer training, Gait training, Stair training, Balance training, Neuromuscular re-education, Strengthening, Endurance training, Therapeutic exercise, Therapeutic activity  PT Plan: Ongoing PT  PT Frequency: 4 times per week (during this acute inpatient hospitalization)  PT Discharge Recommendations: Low intensity level of continued care  Equipment Recommended upon Discharge: Wheeled walker  PT Recommended Transfer Status: Stand by assist, Assistive device  PT - OK to Discharge: Yes    PT Visit Info:  PT Received On: 07/29/25     General Visit Information:   General  Prior to Session Communication: Bedside nurse  Patient Position Received: Bed, 2 rail up, Alarm off, not on at start of session  General Comment: Cleared by nursing to be seen for therapy, pt agreeable with tx, supine in bed upon arrival.    Subjective   Precautions:  Precautions  Medical Precautions: Fall precautions, Oxygen therapy device and L/min, Seizure precautions (4L oxygen)    Objective   Pain:  Pain Assessment  Pain Assessment: 0-10  0-10 (Numeric) Pain Score: 6  Pain Type: Acute pain  Pain Location: Chest  Pain Orientation: Upper  Pain Interventions: Repositioned  Response to Interventions: Content/relaxed    Cognition:  Cognition  Overall Cognitive  Status: Within Functional Limits  Orientation Level: Oriented X4     Postural Control:  Static Sitting Balance  Static Sitting-Balance Support: Feet supported  Static Sitting-Level of Assistance: Independent  Static Sitting-Comment/Number of Minutes: Good seated static balance  Static Standing Balance  Static Standing-Balance Support: Bilateral upper extremity supported  Static Standing-Level of Assistance: Close supervision  Static Standing-Comment/Number of Minutes: Good static standing balance with bilateral UE support on walker     Treatments:  Bed Mobility  Bed Mobility: Yes  Bed Mobility 1  Bed Mobility 1: Supine to sitting  Level of Assistance 1: Independent  Bed Mobility Comments 1: HOB flat without use of bedrail    Ambulation/Gait Training  Ambulation/Gait Training Performed: Yes  Ambulation/Gait Training 1  Surface 1: Level tile  Device 1: Rolling walker  Assistance 1: Close supervision  Comments/Distance (ft) 1: 100'x2 with wheeled walker, no SOB noted.    Transfers  Transfer: Yes  Transfer 1  Transfer From 1: Sit to  Transfer to 1: Stand  Technique 1: Sit to stand, Stand to sit  Transfer Device 1: Walker  Transfer Level of Assistance 1: Modified independent  Trials/Comments 1: Good hand placement, good eccentric control in sitting.    Outcome Measures:  [facility] Basic Mobility  Turning from your back to your side while in a flat bed without using bedrails: None  Moving from lying on your back to sitting on the side of a flat bed without using bedrails: None  Moving to and from bed to chair (including a wheelchair): A little  Standing up from a chair using your arms (e.g. wheelchair or bedside chair): A little  To walk in hospital room: A little  Climbing 3-5 steps with railing: A little  Basic Mobility - Total Score: 20    Education Documentation  Mobility Training, taught by Alexandria Aguilar PTA at 7/29/2025 11:53 AM.  Learner: Patient  Readiness: Acceptance  Method: Explanation  Response: Verbalizes  Understanding    Education Comments  07/29/25 2177 Alexandria Aguilar, SALLY  Patient educated on the importance of mobility and participation with therapy. Educated on safety and use of call light for assistance.         Encounter Problems       Encounter Problems (Active)       PT STG Problem       Patient will transfer sit to stand and stand to sit with independent assist to facilitate mobility. (Progressing)       Start:  07/27/25    Expected End:  08/27/25            Patient will amb 150 feet rolling walker device including two turns on even surface with independent assist to facilitate safe mobility.   (Progressing)       Start:  07/27/25    Expected End:  08/27/25            Patient will negotiate 12 stairs with one rail(s) and independent} assist with no device for in home and community. (Not Progressing)       Start:  07/27/25    Expected End:  08/27/25

## 2025-07-29 NOTE — CARE PLAN
The patient's goals for the shift include      The clinical goals for the shift include pain management      Problem: Pain - Adult  Goal: Verbalizes/displays adequate comfort level or baseline comfort level  Outcome: Progressing     Problem: Safety - Adult  Goal: Free from fall injury  Outcome: Progressing     Problem: Discharge Planning  Goal: Discharge to home or other facility with appropriate resources  Outcome: Progressing     Problem: Chronic Conditions and Co-morbidities  Goal: Patient's chronic conditions and co-morbidity symptoms are monitored and maintained or improved  Outcome: Progressing     Problem: Nutrition  Goal: Nutrient intake appropriate for maintaining nutritional needs  Outcome: Progressing     Problem: Respiratory  Goal: Clear secretions with interventions this shift  Outcome: Progressing  Goal: Minimize anxiety/maximize coping throughout shift  Outcome: Progressing  Goal: Minimal/no exertional discomfort or dyspnea this shift  Outcome: Progressing  Goal: No signs of respiratory distress (eg. Use of accessory muscles. Peds grunting)  Outcome: Progressing     Problem: Pain  Goal: Takes deep breaths with improved pain control throughout the shift  Outcome: Progressing  Goal: Turns in bed with improved pain control throughout the shift  Outcome: Progressing  Goal: Walks with improved pain control throughout the shift  Outcome: Progressing  Goal: Performs ADL's with improved pain control throughout shift  Outcome: Progressing  Goal: Participates in PT with improved pain control throughout the shift  Outcome: Progressing  Goal: Free from opioid side effects throughout the shift  Outcome: Progressing  Goal: Free from acute confusion related to pain meds throughout the shift  Outcome: Progressing

## 2025-07-29 NOTE — PROGRESS NOTES
Anticipate discharge soon. Patient will return home with [facility]. Will follow as needed.      07/29/25 1225   Discharge Planning   Home or Post Acute Services In home services   Type of Home Care Services Home PT   Expected Discharge Disposition Home H  ([facility])   Does the patient need discharge transport arranged? No

## 2025-07-29 NOTE — NURSING NOTE
Assumed care at 1900. Patient in bed. Routine night meds given including prn pain medications. Bed low, locked and call light in reach. Will continue to follow plan of care.

## 2025-07-29 NOTE — DISCHARGE SUMMARY
Discharge Diagnosis  Chest pain           Issues Requiring Follow-Up  Establish with pain management  Follow-up with [facility] pulmonology    Discharge Meds     Medication List      PAUSE taking these medications     empagliflozin 10 mg tablet; Wait to take this until: August 1, 2025;   Commonly known as: Jardiance; Take 1 tablet (10 mg) by mouth once daily.   Do not fill before July 30, 2025.   losartan 50 mg tablet; Wait to take this until: August 1, 2025; Commonly   known as: Cozaar     START taking these medications     oxyCODONE 10 mg immediate release tablet; Commonly known as: Roxicodone;   Take 1 tablet (10 mg) by mouth every 6 hours if needed for moderate pain   (4 - 6) or severe pain (7 - 10).   predniSONE 20 mg tablet; Commonly known as: Deltasone; Take 2 tablets   (40 mg) by mouth once daily for 5 days then stop.     CONTINUE taking these medications     allopurinol 300 mg tablet; Commonly known as: Zyloprim   atorvastatin 40 mg tablet; Commonly known as: Lipitor   buPROPion  mg 24 hr tablet; Commonly known as: Wellbutrin XL   carvedilol 3.125 mg tablet; Commonly known as: Coreg   Eliquis 5 mg tablet; Generic drug: apixaban   fluticasone propion-salmeteroL 250-50 mcg/dose diskus inhaler; Commonly   known as: Advair Diskus   ipratropium-albuteroL 0.5-2.5 mg/3 mL nebulizer solution; Commonly known   as: Duo-Neb   levETIRAcetam 750 mg tablet; Commonly known as: Keppra   oxygen gas therapy; Commonly known as: O2; Inhale 1 each every 12 hours.   polyethylene glycol 17 gram packet; Commonly known as: Glycolax, Miralax   potassium chloride CR 10 mEq ER tablet; Commonly known as: Klor-Con   spironolactone 50 mg tablet; Commonly known as: Aldactone   torsemide 10 mg tablet; Commonly known as: Demadex   traZODone 100 mg tablet; Commonly known as: Desyrel     STOP taking these medications     albuterol sulfate 90 mcg/actuation aero powdr breath act w/sensor   inhaler; Commonly known as: Proair Digihaler   aspirin  81 mg EC tablet       Test Results Pending At Discharge  Pending Labs       No current pending labs.            Hospital Course   70-year-old male past medical history of CAD, chronic respiratory failure on 4 L, presenting with pleuritic chest pain worsened by deep breaths, to have an acute kidney injury..  Patient was seen by cardiology during hospitalization, and troponin trend was flat and not felt to be coronary in nature.  With the deep breaths, this certainly was felt to be pleuritic.  Patient was seen by pulmonology who diuresed the patient with Bumex IV, and creatinine actually did improve after diuresis.  There was no change in respiratory status and he was on 4 L throughout admission.  Patient has a long history of chest pain over the last year or so, he has had multiple presentations to the ER complaining of pain, and has multiple short prescriptions for opioids; discussed with the patient that he had followed with pain management before but was lost to follow-up, and that he needs to establish again as this pattern on the OARRS report is concerning.  Patient is discharged in stable condition with instructions to follow-up with [facility] pulmonology and pain management    Pertinent Physical Exam At Time of Discharge  Physical Exam  General: Obese, nontoxic  Eyes: Clear sclera  Neck: No JVD.  Increased circumference grossly  Cardio: Regular rate rhythm  Respiratory: Furthers nasal cannula.  No stress.  Outpatient Follow-Up  No future appointments.      Jacob Mustafa DO

## 2025-07-29 NOTE — PROGRESS NOTES
"Kapil MICHAEL Child is a 70 y.o. male on day 0 of admission presenting with Chest pain.    Subjective   Alert and oriented, sitting up at bedside.  Patient denies any complaints, states he would like to go home today.  He denies any chest pain at rest, continues to report discomfort only with deep inhalation.        Objective     Physical Exam  Appearance: Alert, oriented, cooperative, in no acute distress.     Eyes: Conjunctiva pink with no redness or exudates. Eyelids without lesions. No scleral icterus.     ENT: Hearing grossly intact. External inspection of ears without lesions or erythema. no nasal flaring. no tripoding, no drooling, no difficulty swallowing oral secretions.    Pulmonary: Clear bilaterally with good chest wall excursion. No rales, rhonchi or wheezing. No accessory muscle use or stridor. No difficulty completing a full sentence without taking a breath    Cardiac: regular rhythm, normal rate no rub, gallop. No JVD.    Musculoskeletal: No cyanosis, clubbing.  No significant lower extremity edema, chronic venous stasis skin changes b/l.  Capillary refill less than 2 seconds in lower extremities    Neurological: no focal findings identified.    Psychiatric: Appropriate mood and affect.    Last Recorded Vitals  Blood pressure 131/73, pulse 66, temperature 36.3 °C (97.3 °F), temperature source Axillary, resp. rate 18, height 1.905 m (6' 3\"), weight 149 kg (328 lb), SpO2 97%.  Intake/Output last 3 Shifts:  I/O last 3 completed shifts:  In: 1122 (7.5 mL/kg) [P.O.:1122]  Out: - (0 mL/kg)   Weight: 148.8 kg     Relevant Results  Results for orders placed or performed during the hospital encounter of 07/26/25 (from the past 24 hours)   Basic metabolic panel   Result Value Ref Range    Glucose 90 74 - 99 mg/dL    Sodium 140 136 - 145 mmol/L    Potassium 4.5 3.5 - 5.3 mmol/L    Chloride 102 98 - 107 mmol/L    Bicarbonate 31 21 - 32 mmol/L    Anion Gap 12 10 - 20 mmol/L    Urea Nitrogen 49 (H) 6 - 23 mg/dL    " Creatinine 1.39 (H) 0.50 - 1.30 mg/dL    eGFR 54 (L) >60 mL/min/1.73m*2    Calcium 8.8 8.6 - 10.3 mg/dL   CBC   Result Value Ref Range    WBC 10.2 4.4 - 11.3 x10*3/uL    nRBC 0.0 0.0 - 0.0 /100 WBCs    RBC 5.20 4.50 - 5.90 x10*6/uL    Hemoglobin 14.3 13.5 - 17.5 g/dL    Hematocrit 45.2 41.0 - 52.0 %    MCV 87 80 - 100 fL    MCH 27.5 26.0 - 34.0 pg    MCHC 31.6 (L) 32.0 - 36.0 g/dL    RDW 16.1 (H) 11.5 - 14.5 %    Platelets 199 150 - 450 x10*3/uL     Scheduled medications  Scheduled Medications[1]  Continuous medications  Continuous Medications[2]  PRN medications  PRN Medications[3]    Assessment & Plan  Chest pain    Shortness of breath   Chest pain   COPD exacerbation  Chronic respiratory failure on home oxygen at 4 L baseline  CKD stage III  Paroxysmal atrial fibrillation on Eliquis  History of STEMI 6/2023- s/p PCI of OM1 and OM2  CAD  Dyslipidemia  Seizure disorder           7/27: As above, presenting with chest pain and shortness of breath. Patient states that he started feeling chest pain earlier in the day and had worsened throughout the day. At its worst he rated his pain 8/10 that was located in the midsternum. The pain he describes as a sharp ache and a burning that worsens with deep breaths. The pain did not radiate to the neck, jaw, or back.  He was given IV morphine and dilaudid in which relieved his chest pain. At this time he states to still having the chest pain, but only worsening with deep breathing no pain at rest or with shallow breathing.               Vitals this morning show a heart rate of 86, blood pressure 131/76 satting 98% on 4 L nasal cannula (baseline).  On telemetry patient is sinus rhythm with heart rate 60s-70s.  Patient had echocardiogram completed in December 2024 for that showed an EF of 55± percent, left ventricular diastolic function was not evaluated due to inconsistent or technically suboptimal data.  Right ventricle is normal in size, right ventricle systolic function is  normal, +1 mitral valve regurgitation, aortic sclerosis without significant stenosis.   With the patient's increased creatinine his home medications of spironolactone, Jardiance, and losartan were placed on hold.  Admitting team has resumed his home medications of apixaban 5 mg twice daily for stroke risk reduction in the setting of atrial fibrillation, atorvastatin 40 mg daily, carvedilol 3.125 mg twice daily, and torsemide 50 mg daily. Will hold patients torsemide as well, with the increased creatinine.  At this time the patient is not having an acute coronary event; troponins are negative at 14 and 13. The chest discomfort that the patient describes is pleuric in nature as he describes his chest discomfort as a sharp ache and a burn when he takes a deep breath, the pain only worsens with deep breathing no pain at rest or with shallow breathing, and there are no ischemic changes noted on the EKG. With the patient's cardiac history and increased creatinine, will follow you for an additional 1 to 2 days.      7/28: As above. Patient reports chest pain continues and is worse with inspirations. He feels it is his lungs, not his heart. Suspect patient's chest pain is pleuritic. As mentioned above, EKG is nonischemic and serial troponins are flat. Blood pressures are well controlled with most recent reading of 129/70. Telemetry with sinus rhythm with PVCs, rates in the 60s. Labs today with sodium 135, potassium 4.6, creatinine 1.82, magnesium 2.59, WBC 12.2, hemoglobin 13.8. I agree with holding his losartan, spironolactone, and torsemide until his NITISH recovers. Continue apixaban 5 mg BID, atorvastatin 40 mg every day, carvedilol 3.125 mg BID. Pulmonology ordered a TTE; will await results.      7/29: Vital signs and labs reviewed, mildly elevated /73, heart rate 66, 97% - 99% oxygen on 4 L, no electrolyte abnormalities found on labs, creatinine remains elevated but improving 1.39, BUN 49, GFR 54, hemoglobin  remains stable 14.3, hematocrit 45.2. I agree with continuing to hold his losartan, spironolactone, and torsemide until his NITISH recovers. Pulmonology ordered Bumex 2 mg IV today, as well as an echocardiogram yesterday, which shows a preserved EF of 55-60%, no visible valvular abnormalities no diastolic dysfunction was noted, however, exam is limited by suboptimal image quality.  Telemetry reviewed, normal sinus rhythm with occasional PVCs, rates predominantly in the 60s-70s.  He has no signs of fluid overload on exam, prior chest x-ray reviewed, no concerning acute cardiopulmonary process.  No new recommendations from a cardiac perspective at this time, patient is considered stable. As above, pain is pleuritic in nature. He follows cardiology with Dr. Jones Viera through [facility], recommend follow up with his cardiology in 2-3 weeks in the outpatient setting. We will sign off at this time. Thank you for the consult, please do not hesitate to reach out with any further concerns.      I spent 35 minutes in the professional and overall care of this patient.      Elizabeth Parsons PA-C         [1] allopurinol, 300 mg, oral, Daily  apixaban, 5 mg, oral, BID  atorvastatin, 40 mg, oral, Nightly  budesonide, 0.25 mg, nebulization, BID   And  formoterol, 20 mcg, nebulization, q12h  buPROPion XL, 150 mg, oral, Daily  carvedilol, 3.125 mg, oral, BID  [Held by provider] empagliflozin, 10 mg, oral, Daily  levETIRAcetam, 750 mg, oral, BID  [Held by provider] losartan, 50 mg, oral, Daily  [Held by provider] potassium chloride CR, 10 mEq, oral, Nightly  [Held by provider] spironolactone, 50 mg, oral, Daily  torsemide, 50 mg, oral, Daily  traZODone, 100 mg, oral, Nightly  [2]    [3] PRN medications: acetaminophen **OR** acetaminophen **OR** acetaminophen, HYDROmorphone, ipratropium-albuteroL, nystatin, ondansetron **OR** ondansetron, oxyCODONE, oxyCODONE, polyethylene glycol

## 2025-07-29 NOTE — NURSING NOTE
Heart Failure Nurse Navigator Chart Review    The role of the HF nurse navigator is to (1) characterize risk profiles of patients with heart failure transitioning from [id number] after hospitalization, (2) recommend interventions to improve care and reduce risks of worse post-hospitalization outcomes.     Review of chart by HF Nurse Navigator. Pt admitted for CP. Seen by Cards, BNP 46, TTE: CONCLUSIONS:   1. Left ventricular ejection fraction is normal by visual estimate at 55-60%.   2. Poorly visualized anatomical structures due to suboptimal image quality.   3. There is normal right ventricular global systolic function.    Pt feels this is more in his lungs and cards notes CP is suspected to be pleuritic. Pulm is following.    I will not be following at this time. Please submit HF Nurse Navigator referral if needed.    Kassy Henriquez RN BSN  [facility] Clinical Nurse Navigator, CHF  [phone]

## 2025-07-29 NOTE — DISCHARGE INSTRUCTIONS
Please establish with pain management through [facility], where you get all of your care.    You should also follow-up with pulmonology, and I provided you the name and number of a group at the same facility.

## 2025-07-31 ENCOUNTER — APPOINTMENT (OUTPATIENT)
Dept: RADIOLOGY | Facility: HOSPITAL | Age: 70
DRG: 177 | End: 2025-07-31
Payer: COMMERCIAL

## 2025-07-31 ENCOUNTER — APPOINTMENT (OUTPATIENT)
Dept: CARDIOLOGY | Facility: HOSPITAL | Age: 70
DRG: 177 | End: 2025-07-31
Payer: COMMERCIAL

## 2025-07-31 ENCOUNTER — HOSPITAL ENCOUNTER (INPATIENT)
Facility: HOSPITAL | Age: 70
LOS: 3 days | Discharge: HOME | DRG: 177 | End: 2025-08-03
Attending: STUDENT IN AN ORGANIZED HEALTH CARE EDUCATION/TRAINING PROGRAM | Admitting: STUDENT IN AN ORGANIZED HEALTH CARE EDUCATION/TRAINING PROGRAM
Payer: COMMERCIAL

## 2025-07-31 DIAGNOSIS — N30.00 ACUTE CYSTITIS WITHOUT HEMATURIA: ICD-10-CM

## 2025-07-31 DIAGNOSIS — I50.30 HEART FAILURE WITH PRESERVED EJECTION FRACTION, UNSPECIFIED HF CHRONICITY: ICD-10-CM

## 2025-07-31 DIAGNOSIS — U07.1 COVID-19: ICD-10-CM

## 2025-07-31 DIAGNOSIS — R41.0 DELIRIUM: Primary | ICD-10-CM

## 2025-07-31 LAB
ALBUMIN SERPL BCP-MCNC: 3.6 G/DL (ref 3.4–5)
ALP SERPL-CCNC: 78 U/L (ref 33–136)
ALT SERPL W P-5'-P-CCNC: 23 U/L (ref 10–52)
AMMONIA PLAS-SCNC: 34 UMOL/L (ref 16–53)
ANION GAP BLDV CALCULATED.4IONS-SCNC: 1 MMOL/L (ref 10–25)
ANION GAP SERPL CALCULATED.3IONS-SCNC: 10 MMOL/L (ref 10–20)
AST SERPL W P-5'-P-CCNC: 21 U/L (ref 9–39)
BASE EXCESS BLDV CALC-SCNC: 11.4 MMOL/L (ref -2–3)
BASOPHILS # BLD AUTO: 0.02 X10*3/UL (ref 0–0.1)
BASOPHILS NFR BLD AUTO: 0.3 %
BILIRUB SERPL-MCNC: 0.6 MG/DL (ref 0–1.2)
BNP SERPL-MCNC: 224 PG/ML (ref 0–99)
BODY TEMPERATURE: 37 DEGREES CELSIUS
BUN SERPL-MCNC: 43 MG/DL (ref 6–23)
CA-I BLDV-SCNC: 1.16 MMOL/L (ref 1.1–1.33)
CALCIUM SERPL-MCNC: 8.6 MG/DL (ref 8.6–10.3)
CARDIAC TROPONIN I PNL SERPL HS: 20 NG/L (ref 0–20)
CARDIAC TROPONIN I PNL SERPL HS: 22 NG/L (ref 0–20)
CHLORIDE BLDV-SCNC: 96 MMOL/L (ref 98–107)
CHLORIDE SERPL-SCNC: 97 MMOL/L (ref 98–107)
CO2 SERPL-SCNC: 33 MMOL/L (ref 21–32)
CREAT SERPL-MCNC: 1.7 MG/DL (ref 0.5–1.3)
EGFRCR SERPLBLD CKD-EPI 2021: 42 ML/MIN/1.73M*2
EOSINOPHIL # BLD AUTO: 0.05 X10*3/UL (ref 0–0.4)
EOSINOPHIL NFR BLD AUTO: 0.7 %
ERYTHROCYTE [DISTWIDTH] IN BLOOD BY AUTOMATED COUNT: 16.5 % (ref 11.5–14.5)
FLUAV RNA RESP QL NAA+PROBE: NOT DETECTED
FLUBV RNA RESP QL NAA+PROBE: NOT DETECTED
GLUCOSE BLDV-MCNC: 89 MG/DL (ref 74–99)
GLUCOSE SERPL-MCNC: 82 MG/DL (ref 74–99)
HCO3 BLDV-SCNC: 39 MMOL/L (ref 22–26)
HCT VFR BLD AUTO: 45.2 % (ref 41–52)
HCT VFR BLD EST: 43 % (ref 41–52)
HGB BLD-MCNC: 13.8 G/DL (ref 13.5–17.5)
HGB BLDV-MCNC: 14.3 G/DL (ref 13.5–17.5)
IMM GRANULOCYTES # BLD AUTO: 0.05 X10*3/UL (ref 0–0.5)
IMM GRANULOCYTES NFR BLD AUTO: 0.7 % (ref 0–0.9)
INHALED O2 CONCENTRATION: 28 %
LACTATE BLDV-SCNC: 1.2 MMOL/L (ref 0.4–2)
LYMPHOCYTES # BLD AUTO: 0.45 X10*3/UL (ref 0.8–3)
LYMPHOCYTES NFR BLD AUTO: 6.5 %
MAGNESIUM SERPL-MCNC: 2.14 MG/DL (ref 1.6–2.4)
MCH RBC QN AUTO: 27.3 PG (ref 26–34)
MCHC RBC AUTO-ENTMCNC: 30.5 G/DL (ref 32–36)
MCV RBC AUTO: 89 FL (ref 80–100)
MONOCYTES # BLD AUTO: 0.99 X10*3/UL (ref 0.05–0.8)
MONOCYTES NFR BLD AUTO: 14.3 %
NEUTROPHILS # BLD AUTO: 5.35 X10*3/UL (ref 1.6–5.5)
NEUTROPHILS NFR BLD AUTO: 77.5 %
NRBC BLD-RTO: 0 /100 WBCS (ref 0–0)
OXYHGB MFR BLDV: 76.4 % (ref 45–75)
PCO2 BLDV: 63 MM HG (ref 41–51)
PH BLDV: 7.4 PH (ref 7.33–7.43)
PLATELET # BLD AUTO: 156 X10*3/UL (ref 150–450)
PO2 BLDV: 47 MM HG (ref 35–45)
POTASSIUM BLDV-SCNC: 4.4 MMOL/L (ref 3.5–5.3)
POTASSIUM SERPL-SCNC: 4.3 MMOL/L (ref 3.5–5.3)
PROT SERPL-MCNC: 6 G/DL (ref 6.4–8.2)
RBC # BLD AUTO: 5.06 X10*6/UL (ref 4.5–5.9)
SAO2 % BLDV: 78 % (ref 45–75)
SARS-COV-2 RNA RESP QL NAA+PROBE: DETECTED
SODIUM BLDV-SCNC: 132 MMOL/L (ref 136–145)
SODIUM SERPL-SCNC: 136 MMOL/L (ref 136–145)
WBC # BLD AUTO: 6.9 X10*3/UL (ref 4.4–11.3)

## 2025-07-31 PROCEDURE — 2500000004 HC RX 250 GENERAL PHARMACY W/ HCPCS (ALT 636 FOR OP/ED): Mod: JZ | Performed by: INTERNAL MEDICINE

## 2025-07-31 PROCEDURE — 2500000001 HC RX 250 WO HCPCS SELF ADMINISTERED DRUGS (ALT 637 FOR MEDICARE OP): Performed by: INTERNAL MEDICINE

## 2025-07-31 PROCEDURE — 87636 SARSCOV2 & INF A&B AMP PRB: CPT

## 2025-07-31 PROCEDURE — 2500000004 HC RX 250 GENERAL PHARMACY W/ HCPCS (ALT 636 FOR OP/ED): Mod: JZ

## 2025-07-31 PROCEDURE — 84484 ASSAY OF TROPONIN QUANT: CPT

## 2025-07-31 PROCEDURE — 96361 HYDRATE IV INFUSION ADD-ON: CPT

## 2025-07-31 PROCEDURE — 99223 1ST HOSP IP/OBS HIGH 75: CPT | Performed by: INTERNAL MEDICINE

## 2025-07-31 PROCEDURE — 93005 ELECTROCARDIOGRAM TRACING: CPT

## 2025-07-31 PROCEDURE — XW033E5 INTRODUCTION OF REMDESIVIR ANTI-INFECTIVE INTO PERIPHERAL VEIN, PERCUTANEOUS APPROACH, NEW TECHNOLOGY GROUP 5: ICD-10-PCS | Performed by: INTERNAL MEDICINE

## 2025-07-31 PROCEDURE — 70450 CT HEAD/BRAIN W/O DYE: CPT

## 2025-07-31 PROCEDURE — 93010 ELECTROCARDIOGRAM REPORT: CPT | Performed by: INTERNAL MEDICINE

## 2025-07-31 PROCEDURE — 82140 ASSAY OF AMMONIA: CPT

## 2025-07-31 PROCEDURE — 96374 THER/PROPH/DIAG INJ IV PUSH: CPT

## 2025-07-31 PROCEDURE — 71045 X-RAY EXAM CHEST 1 VIEW: CPT

## 2025-07-31 PROCEDURE — 84132 ASSAY OF SERUM POTASSIUM: CPT

## 2025-07-31 PROCEDURE — 71045 X-RAY EXAM CHEST 1 VIEW: CPT | Performed by: RADIOLOGY

## 2025-07-31 PROCEDURE — 99285 EMERGENCY DEPT VISIT HI MDM: CPT | Mod: 25 | Performed by: STUDENT IN AN ORGANIZED HEALTH CARE EDUCATION/TRAINING PROGRAM

## 2025-07-31 PROCEDURE — 1200000002 HC GENERAL ROOM WITH TELEMETRY DAILY

## 2025-07-31 PROCEDURE — 96375 TX/PRO/DX INJ NEW DRUG ADDON: CPT

## 2025-07-31 PROCEDURE — 36415 COLL VENOUS BLD VENIPUNCTURE: CPT

## 2025-07-31 PROCEDURE — 2500000005 HC RX 250 GENERAL PHARMACY W/O HCPCS: Performed by: INTERNAL MEDICINE

## 2025-07-31 PROCEDURE — 83735 ASSAY OF MAGNESIUM: CPT

## 2025-07-31 PROCEDURE — 83880 ASSAY OF NATRIURETIC PEPTIDE: CPT

## 2025-07-31 PROCEDURE — 85025 COMPLETE CBC W/AUTO DIFF WBC: CPT

## 2025-07-31 RX ORDER — POLYETHYLENE GLYCOL 3350 17 G/17G
17 POWDER, FOR SOLUTION ORAL DAILY
Status: DISCONTINUED | OUTPATIENT
Start: 2025-07-31 | End: 2025-08-03 | Stop reason: HOSPADM

## 2025-07-31 RX ORDER — LOSARTAN POTASSIUM 50 MG/1
50 TABLET ORAL DAILY
Status: DISCONTINUED | OUTPATIENT
Start: 2025-08-01 | End: 2025-08-03 | Stop reason: HOSPADM

## 2025-07-31 RX ORDER — TALC
3 POWDER (GRAM) TOPICAL NIGHTLY PRN
Status: DISCONTINUED | OUTPATIENT
Start: 2025-07-31 | End: 2025-08-03 | Stop reason: HOSPADM

## 2025-07-31 RX ORDER — HYDROMORPHONE HYDROCHLORIDE 1 MG/ML
1 INJECTION, SOLUTION INTRAMUSCULAR; INTRAVENOUS; SUBCUTANEOUS ONCE
Status: COMPLETED | OUTPATIENT
Start: 2025-07-31 | End: 2025-07-31

## 2025-07-31 RX ORDER — CARVEDILOL 3.12 MG/1
3.12 TABLET ORAL 2 TIMES DAILY
Status: DISCONTINUED | OUTPATIENT
Start: 2025-07-31 | End: 2025-08-03 | Stop reason: HOSPADM

## 2025-07-31 RX ORDER — OXYCODONE HYDROCHLORIDE 5 MG/1
10 TABLET ORAL EVERY 6 HOURS PRN
Status: DISCONTINUED | OUTPATIENT
Start: 2025-07-31 | End: 2025-08-02

## 2025-07-31 RX ORDER — ACETAMINOPHEN 160 MG/5ML
650 SOLUTION ORAL EVERY 4 HOURS PRN
Status: DISCONTINUED | OUTPATIENT
Start: 2025-07-31 | End: 2025-08-03 | Stop reason: HOSPADM

## 2025-07-31 RX ORDER — BUDESONIDE 0.5 MG/2ML
0.5 INHALANT ORAL
Status: DISCONTINUED | OUTPATIENT
Start: 2025-07-31 | End: 2025-08-03 | Stop reason: HOSPADM

## 2025-07-31 RX ORDER — IPRATROPIUM BROMIDE AND ALBUTEROL SULFATE 2.5; .5 MG/3ML; MG/3ML
3 SOLUTION RESPIRATORY (INHALATION) 4 TIMES DAILY PRN
Status: DISCONTINUED | OUTPATIENT
Start: 2025-07-31 | End: 2025-08-03 | Stop reason: HOSPADM

## 2025-07-31 RX ORDER — ONDANSETRON HYDROCHLORIDE 2 MG/ML
4 INJECTION, SOLUTION INTRAVENOUS EVERY 8 HOURS PRN
Status: DISCONTINUED | OUTPATIENT
Start: 2025-07-31 | End: 2025-08-03 | Stop reason: HOSPADM

## 2025-07-31 RX ORDER — DEXAMETHASONE SODIUM PHOSPHATE 10 MG/ML
10 INJECTION INTRAMUSCULAR; INTRAVENOUS EVERY 24 HOURS
Status: DISCONTINUED | OUTPATIENT
Start: 2025-07-31 | End: 2025-08-01

## 2025-07-31 RX ORDER — TORSEMIDE 100 MG/1
50 TABLET ORAL DAILY
Status: DISCONTINUED | OUTPATIENT
Start: 2025-08-01 | End: 2025-08-03 | Stop reason: HOSPADM

## 2025-07-31 RX ORDER — TRAZODONE HYDROCHLORIDE 100 MG/1
200 TABLET ORAL NIGHTLY
Status: DISCONTINUED | OUTPATIENT
Start: 2025-07-31 | End: 2025-08-03 | Stop reason: HOSPADM

## 2025-07-31 RX ORDER — ONDANSETRON 4 MG/1
4 TABLET, ORALLY DISINTEGRATING ORAL EVERY 8 HOURS PRN
Status: DISCONTINUED | OUTPATIENT
Start: 2025-07-31 | End: 2025-08-03 | Stop reason: HOSPADM

## 2025-07-31 RX ORDER — ACETAMINOPHEN 650 MG/1
650 SUPPOSITORY RECTAL EVERY 4 HOURS PRN
Status: DISCONTINUED | OUTPATIENT
Start: 2025-07-31 | End: 2025-08-03 | Stop reason: HOSPADM

## 2025-07-31 RX ORDER — ACETAMINOPHEN 325 MG/1
650 TABLET ORAL EVERY 4 HOURS PRN
Status: DISCONTINUED | OUTPATIENT
Start: 2025-07-31 | End: 2025-08-03 | Stop reason: HOSPADM

## 2025-07-31 RX ORDER — FORMOTEROL FUMARATE 20 UG/2ML
20 SOLUTION RESPIRATORY (INHALATION)
Status: DISCONTINUED | OUTPATIENT
Start: 2025-07-31 | End: 2025-08-03 | Stop reason: HOSPADM

## 2025-07-31 RX ORDER — ALLOPURINOL 300 MG/1
300 TABLET ORAL DAILY
Status: DISCONTINUED | OUTPATIENT
Start: 2025-08-01 | End: 2025-08-03 | Stop reason: HOSPADM

## 2025-07-31 RX ORDER — BUPROPION HYDROCHLORIDE 150 MG/1
150 TABLET ORAL DAILY
Status: DISCONTINUED | OUTPATIENT
Start: 2025-08-01 | End: 2025-08-03 | Stop reason: HOSPADM

## 2025-07-31 RX ORDER — ONDANSETRON HYDROCHLORIDE 2 MG/ML
4 INJECTION, SOLUTION INTRAVENOUS ONCE
Status: COMPLETED | OUTPATIENT
Start: 2025-07-31 | End: 2025-07-31

## 2025-07-31 RX ORDER — SPIRONOLACTONE 50 MG/1
50 TABLET, FILM COATED ORAL DAILY
Status: DISCONTINUED | OUTPATIENT
Start: 2025-08-01 | End: 2025-08-03 | Stop reason: HOSPADM

## 2025-07-31 RX ADMIN — CARVEDILOL 3.12 MG: 3.12 TABLET, FILM COATED ORAL at 20:30

## 2025-07-31 RX ADMIN — APIXABAN 5 MG: 5 TABLET, FILM COATED ORAL at 20:29

## 2025-07-31 RX ADMIN — HYDROMORPHONE HYDROCHLORIDE 1 MG: 1 INJECTION, SOLUTION INTRAMUSCULAR; INTRAVENOUS; SUBCUTANEOUS at 14:06

## 2025-07-31 RX ADMIN — BENZOCAINE AND MENTHOL 1 LOZENGE: 15; 3.6 LOZENGE ORAL at 20:31

## 2025-07-31 RX ADMIN — POLYETHYLENE GLYCOL 3350 17 G: 17 POWDER, FOR SOLUTION ORAL at 20:31

## 2025-07-31 RX ADMIN — REMDESIVIR 200 MG: 100 INJECTION, POWDER, LYOPHILIZED, FOR SOLUTION INTRAVENOUS at 17:27

## 2025-07-31 RX ADMIN — Medication 3 MG: at 20:30

## 2025-07-31 RX ADMIN — LEVETIRACETAM 750 MG: 250 TABLET, FILM COATED ORAL at 20:29

## 2025-07-31 RX ADMIN — DEXAMETHASONE SODIUM PHOSPHATE 10 MG: 10 INJECTION INTRAMUSCULAR; INTRAVENOUS at 17:28

## 2025-07-31 RX ADMIN — ACETAMINOPHEN 650 MG: 325 TABLET ORAL at 17:28

## 2025-07-31 RX ADMIN — OXYCODONE HYDROCHLORIDE 10 MG: 5 TABLET ORAL at 20:30

## 2025-07-31 RX ADMIN — SODIUM CHLORIDE 500 ML: 900 INJECTION, SOLUTION INTRAVENOUS at 14:06

## 2025-07-31 RX ADMIN — ONDANSETRON 4 MG: 2 INJECTION, SOLUTION INTRAMUSCULAR; INTRAVENOUS at 14:06

## 2025-07-31 RX ADMIN — TRAZODONE HYDROCHLORIDE 200 MG: 100 TABLET ORAL at 20:30

## 2025-07-31 ASSESSMENT — COGNITIVE AND FUNCTIONAL STATUS - GENERAL
CLIMB 3 TO 5 STEPS WITH RAILING: A LOT
MOVING FROM LYING ON BACK TO SITTING ON SIDE OF FLAT BED WITH BEDRAILS: A LITTLE
MOBILITY SCORE: 16
HELP NEEDED FOR BATHING: A LITTLE
DAILY ACTIVITIY SCORE: 21
WALKING IN HOSPITAL ROOM: A LOT
DRESSING REGULAR LOWER BODY CLOTHING: A LITTLE
TURNING FROM BACK TO SIDE WHILE IN FLAT BAD: A LITTLE
DRESSING REGULAR UPPER BODY CLOTHING: A LITTLE
MOVING TO AND FROM BED TO CHAIR: A LITTLE
STANDING UP FROM CHAIR USING ARMS: A LITTLE

## 2025-07-31 ASSESSMENT — PAIN - FUNCTIONAL ASSESSMENT
PAIN_FUNCTIONAL_ASSESSMENT: 0-10

## 2025-07-31 ASSESSMENT — ENCOUNTER SYMPTOMS
FEVER: 0
SHORTNESS OF BREATH: 1
CHILLS: 0
COUGH: 1

## 2025-07-31 ASSESSMENT — PAIN SCALES - GENERAL
PAINLEVEL_OUTOF10: 4
PAINLEVEL_OUTOF10: 8
PAINLEVEL_OUTOF10: 0 - NO PAIN
PAINLEVEL_OUTOF10: 8

## 2025-07-31 ASSESSMENT — PAIN DESCRIPTION - DESCRIPTORS: DESCRIPTORS: ACHING

## 2025-07-31 ASSESSMENT — PAIN DESCRIPTION - LOCATION: LOCATION: CHEST

## 2025-07-31 NOTE — ED PROVIDER NOTES
Supervisory note:  Patient seen in conjunction with KOBY Fajardo.    Patient presents with alteration of mentation.  Patient has been confused reportedly since Tuesday evening following discharge from the hospital.  Patient is not able to contribute significantly to history.  When asked what is going on to bring him to the hospital, he is not able to say.  When asked to state what objects are, he is able to name them.  He is able to move all extremities.  Cardiac and respiratory rates are unremarkable.  Patient is on 4 L/min by nasal cannula.  Blood pressure is 105/52 on arrival.    Head CT is unremarkable.  Chest x-ray reveals mild interstitial edema.  Laboratory studies reveal COVID-19 positivity as well as mild elevation of BNP.  Creatinine felt to be at baseline for patient.  Patient accepted to medicine service for further management.    I personally saw the patient and made/approved the management plan and take responsiblity for the patient management.  Parts of this chart were completed with dictation software, please excuse any errors in transcription.     Yandel Sanderson MD  07/31/25 [id number]

## 2025-07-31 NOTE — ED TRIAGE NOTES
BIBA for AMS that started Tuesday at 8 pm. Pt awake and alert, confused with intermittent aphasia, follows some commands. Wears 6L O2 at home

## 2025-07-31 NOTE — CARE PLAN
RE-ADMIT NOTE    Pt was admitted to [facility] from July 27th -29th for Pain S/P stenting  He discharged home with [facility] for PT.  Pt has returned to [facility] for confusion since Tues.  He is normally independent at home with ADL's  He uses a cane/walker, rollator and he is on 4 L of home 02  Wife drives him to his appointments  Unknown at this time if pt will have discharge needs    DISCHARGE PLAN: TBD--DO NOT DISCHARGE PATIENT BEFORE SPEAKING TO CARE COORDINATION; PT DOES NOT YET HAVE A DISCHARGE PLAN IN PLACE.

## 2025-07-31 NOTE — PROGRESS NOTES
Pharmacy Medication History Review    Kapil Luna is a 70 y.o. male. Pharmacy reviewed the patient's apgub-hi-mephjvdyo medications and allergies for accuracy.    Medications ADDED:  None   Medications CHANGED:  Trazodone 100mg - 200mg nightly  Losartan 50mg  Jardiance 10mg   Oxygen 4-6Lmg  Medications REMOVED:   None      The list below reflects the updated PTA list. Comments regarding how patient may be taking medications differently can be found in the Admit Orders Activity  Prior to Admission Medications   Prescriptions Last Dose Informant   allopurinol (Zyloprim) 300 mg tablet 7/31/2025 Self, family   Sig: Take 1 tablet (300 mg) by mouth once daily.   apixaban (Eliquis) 5 mg tablet 7/31/2025 Self, family   Sig: Take 1 tablet (5 mg) by mouth 2 times a day.   atorvastatin (Lipitor) 40 mg tablet 7/30/2025 Evening Self, family   Sig: Take 1 tablet (40 mg) by mouth once daily at bedtime.   buPROPion XL (Wellbutrin XL) 150 mg 24 hr tablet 7/31/2025 Self, family   Sig: Take 1 tablet (150 mg) by mouth once daily. Do not crush, chew, or split.   carvedilol (Coreg) 3.125 mg tablet 7/31/2025 Self, family   Sig: Take 1 tablet (3.125 mg) by mouth 2 times a day.   empagliflozin (Jardiance) 10 mg tablet 7/31/2025 Self, family   Sig: Take 1 tablet (10 mg) by mouth once daily. Do not fill before July 30, 2025.   fluticasone propion-salmeteroL (Advair Diskus) 250-50 mcg/dose diskus inhaler 7/31/2025 Self, family   Sig: Inhale 1 puff 2 times a day. Rinse mouth with water after use to reduce aftertaste and incidence of candidiasis. Do not swallow.   ipratropium-albuteroL (Duo-Neb) 0.5-2.5 mg/3 mL nebulizer solution Unknown Self, family   Sig: Take 3 mL by nebulization 4 times a day as needed for wheezing.   levETIRAcetam (Keppra) 750 mg tablet 7/31/2025 Self, family   Sig: Take 1 tablet (750 mg) by mouth 2 times a day.   losartan (Cozaar) 50 mg tablet 7/30/2025 Self, family   Sig: Take 1 tablet (50 mg) by mouth once daily.    oxyCODONE (Roxicodone) 10 mg immediate release tablet Unknown Self, family   Sig: Take 1 tablet (10 mg) by mouth every 6 hours if needed for moderate pain (4 - 6) or severe pain (7 - 10).   oxygen (O2) gas therapy 7/30/2025 Self, family   Sig: Inhale 1 each every 12 hours.   Patient taking differently: Inhale 4-6 L/min at 240,000-360,000 mL/hr continuously.   polyethylene glycol (Glycolax, Miralax) 17 gram packet 7/30/2025 Self, family   Sig: Take 17 g by mouth once daily.   potassium chloride CR 10 mEq ER tablet 7/31/2025 Self, family   Sig: Take 1 tablet (10 mEq) by mouth once daily at bedtime. Do not crush, chew, or split.   predniSONE (Deltasone) 20 mg tablet 7/30/2025 Self, family   Sig: Take 2 tablets (40 mg) by mouth once daily for 5 days then stop.   spironolactone (Aldactone) 50 mg tablet 7/31/2025 Self, family   Sig: Take 1 tablet (50 mg) by mouth once daily.   torsemide (Demadex) 10 mg tablet 7/31/2025 Self, family   Sig: Take 5 tablets (50 mg) by mouth once daily.   traZODone (Desyrel) 100 mg tablet 7/30/2025 Evening Self, family   Sig: Take 2 tablets (200 mg) by mouth once daily at bedtime.      Facility-Administered Medications: None        The list below reflects the updated allergy list. Please review each documented allergy for additional clarification and justification.  Allergies  Reviewed by Greta Lawton CPhT on 7/31/2025        Severity Reactions Comments    Cephalosporins Not Specified Swelling     Codeine Not Specified Nausea/vomiting     Penicillins Not Specified Swelling     Tramadol Not Specified Nausea/vomiting     Adhesive Tape-silicones Low Rash             Pharmacy has been updated to [facility].    Sources used to complete the med history include dispense history, PTA medication list, patient interview. Informants are fair historians.    Below are additional concerns with the patient's PTA list.  Family reports not pausing the jardiance 10mg and the losartan 50mg at  discharge 2 days ago. Both were given once since then at home.    Greta Lawton, CPhT-Adv  Please reach out via [employer] Chat for questions

## 2025-07-31 NOTE — ED PROVIDER NOTES
HPI   Chief Complaint   Patient presents with    Altered Mental Status       HPI  Patient is a 70-year-old male who presents ED for altered mental status, believed to have started 2 days ago, progressively worsening per the patient's wife.  Patient reportedly woke up this morning confused with some word finding difficulty.  Patient is answering most questions appropriately, he does appear to have difficulty finding his words however he is able to understand and express speech.  Patient is on 6 L at baseline for chronic respiratory failure.  He is endorsing chest pain today.  Denying any other acute complaints.      Patient History   Medical History[1]  Surgical History[2]  Family History[3]  Social History[4]    Physical Exam   ED Triage Vitals   Temperature Heart Rate Respirations BP   07/31/25 1138 07/31/25 1138 07/31/25 1138 07/31/25 1140   37.4 °C (99.3 °F) 87 19 105/52      Pulse Ox Temp Source Heart Rate Source Patient Position   07/31/25 1138 07/31/25 1138 07/31/25 1138 07/31/25 1138   97 % Oral Monitor Lying      BP Location FiO2 (%)     07/31/25 1138 --     Left arm        Physical Exam  Vitals reviewed.   Constitutional:       General: He is not in acute distress.     Appearance: He is ill-appearing.   HENT:      Head: Atraumatic.      Mouth/Throat:      Mouth: Mucous membranes are dry.     Eyes:      Extraocular Movements: Extraocular movements intact.      Pupils: Pupils are equal, round, and reactive to light.       Cardiovascular:      Rate and Rhythm: Normal rate and regular rhythm.      Heart sounds: Normal heart sounds.   Pulmonary:      Effort: Pulmonary effort is normal.      Breath sounds: Wheezing and rhonchi present.   Abdominal:      General: Abdomen is flat. There is no distension.      Palpations: Abdomen is soft.      Tenderness: There is no abdominal tenderness.     Musculoskeletal:         General: Normal range of motion.      Cervical back: Normal range of motion and neck supple.      Skin:     General: Skin is warm.     Neurological:      Mental Status: He is alert. He is disoriented.      Cranial Nerves: No cranial nerve deficit.      Sensory: No sensory deficit.      Motor: No weakness.      Coordination: Coordination normal.      Gait: Gait normal.     Psychiatric:         Behavior: Behavior normal.       NIH = 0  TEST OF SKEW IS NEGATIVE  NO TRUNCAL ATAXIA NOTED    ED Course & MDM   ED Course as of 07/31/25 1416   Thu Jul 31, 2025   1158 EKG interpreted by me: Sinus rhythm with borderline first-degree AV block with very frequent PVCs, rate 85.  Leftward axis.  Likely left bundle branch block.  Sgarbossa criteria not met.  Compared with previous EKG dated 7/25/2025, PVCs are more frequent but otherwise unchanged. [ML]      ED Course User Index  [ML] Yandel Sanderson MD         Diagnoses as of 07/31/25 1416   Delirium   COVID-19                 No data recorded     Gayathri Coma Scale Score: 14 (07/31/25 1140 : Felecia Gandhi RN)                           Medical Decision Making  Parts of this chart have been completed using voice recognition software. Please excuse any errors of transcription.  My thought process and reason for plan has been formulated from the time that I saw the patient until the time of disposition and is not specific to one specific moment during their visit and furthermore my MDM encompasses this entire chart and not only this text box.    HPI:   A medically appropriate HPI was obtained, outlined above.    Kapil MICHAEL Child is a  70 y.o. male    Chief Complaint   Patient presents with    Altered Mental Status       Medical History[5]    Surgical History[6]    Social History[7]    Family History[8]    Allergies[9]    Current Outpatient Medications   Medication Instructions    allopurinol (ZYLOPRIM) 300 mg, oral, Daily    apixaban (ELIQUIS) 5 mg, oral, 2 times daily    atorvastatin (LIPITOR) 40 mg, oral, Nightly    buPROPion XL (WELLBUTRIN XL) 150 mg, oral, Daily, Do not  crush, chew, or split.    carvedilol (COREG) 3.125 mg, oral, 2 times daily    empagliflozin (JARDIANCE) 10 mg, oral, Daily    fluticasone propion-salmeteroL (Advair Diskus) 250-50 mcg/dose diskus inhaler 1 puff, inhalation, 2 times daily RT, Rinse mouth with water after use to reduce aftertaste and incidence of candidiasis. Do not swallow.    ipratropium-albuteroL (Duo-Neb) 0.5-2.5 mg/3 mL nebulizer solution 3 mL, nebulization, 4 times daily PRN    levETIRAcetam (KEPPRA) 750 mg, oral, 2 times daily    losartan (COZAAR) 50 mg, oral, Daily    oxyCODONE (ROXICODONE) 10 mg, oral, Every 6 hours PRN    oxygen (O2) gas therapy 1 each, inhalation, Every 12 hours    polyethylene glycol (GLYCOLAX, MIRALAX) 17 g, oral, Daily    potassium chloride CR 10 mEq ER tablet 10 mEq, oral, Nightly, Do not crush, chew, or split.    predniSONE (Deltasone) 20 mg tablet Take 2 tablets (40 mg) by mouth once daily for 5 days then stop.    spironolactone (ALDACTONE) 50 mg, oral, Daily    torsemide (DEMADEX) 50 mg, oral, Daily    traZODone (DESYREL) 200 mg, oral, Nightly   for details    Exam:   Patient Vitals for the past 24 hrs:   BP Temp Temp src Pulse Resp SpO2 Height Weight   08/01/25 0817 -- -- -- -- -- 97 % -- --   08/01/25 0736 120/69 36.4 °C (97.5 °F) Oral 64 20 95 % -- --   08/01/25 0023 105/71 36.9 °C (98.4 °F) Oral 78 20 98 % -- --   07/31/25 2010 112/71 36.7 °C (98.1 °F) Oral 67 20 97 % -- --   07/31/25 1730 97/58 -- -- 74 (!) 23 -- -- --   07/31/25 1700 105/60 -- -- 71 (!) 21 -- -- --   07/31/25 1630 (!) 117/96 -- -- 73 (!) 23 94 % -- --   07/31/25 1600 110/67 -- -- 71 12 95 % -- --   07/31/25 1530 100/65 -- -- 72 18 94 % -- --   07/31/25 1515 106/53 -- -- 80 (!) 25 (!) 93 % -- --   07/31/25 1430 102/63 -- -- 70 17 -- -- --   07/31/25 1400 102/64 -- -- 76 (!) 22 -- -- --   07/31/25 1330 95/64 -- -- 74 (!) 25 -- -- --   07/31/25 1300 108/87 -- -- 80 (!) 23 94 % -- --   07/31/25 1230 106/71 -- -- 76 (!) 21 (!) 93 % -- --   07/31/25  "1200 111/64 -- -- 85 (!) 21 95 % -- --   07/31/25 1140 105/52 -- -- -- -- -- -- --   07/31/25 1138 -- 37.4 °C (99.3 °F) Oral 87 19 97 % 1.93 m (6' 4\") (!) 153 kg (336 lb 6.8 oz)       A medically appropriate exam performed, outlined above, given the known history and presentation.    EKG/Cardiac monitor:   If EKG was done and, it was interpreted by attending physician, see their note for ED course for more detail.    Medications given during visit:  Medications   allopurinol (Zyloprim) tablet 300 mg (300 mg oral Given 8/1/25 0851)   apixaban (Eliquis) tablet 5 mg (5 mg oral Given 8/1/25 0850)   buPROPion XL (Wellbutrin XL) 24 hr tablet 150 mg (150 mg oral Given 8/1/25 0851)   carvedilol (Coreg) tablet 3.125 mg (3.125 mg oral Given 8/1/25 0850)   budesonide (Pulmicort) 0.5 mg/2 mL nebulizer solution 0.5 mg (0.5 mg nebulization Given 8/1/25 0812)     And   formoterol (Perforomist) 20 mcg/2 mL nebulizer solution 20 mcg (20 mcg nebulization Given 8/1/25 0812)   ipratropium-albuteroL (Duo-Neb) 0.5-2.5 mg/3 mL nebulizer solution 3 mL ( nebulization Canceled Entry 8/1/25 0018)   levETIRAcetam (Keppra) tablet 750 mg (750 mg oral Given 8/1/25 0851)   losartan (Cozaar) tablet 50 mg ( oral Dose Auto Held 8/5/25 0900)   oxyCODONE (Roxicodone) immediate release tablet 10 mg (10 mg oral Given 8/1/25 1010)   polyethylene glycol (Glycolax, Miralax) packet 17 g (17 g oral Given 8/1/25 0851)   spironolactone (Aldactone) tablet 50 mg ( oral Dose Auto Held 8/5/25 0900)   torsemide (Demadex) tablet 50 mg ( oral Dose Auto Held 8/5/25 0900)   traZODone (Desyrel) tablet 200 mg (200 mg oral Given 7/31/25 2030)   acetaminophen (Tylenol) tablet 650 mg (650 mg oral Given 7/31/25 1728)     Or   acetaminophen (Tylenol) oral liquid 650 mg ( oral See Alternative 7/31/25 1728)     Or   acetaminophen (Tylenol) suppository 650 mg ( rectal See Alternative 7/31/25 1728)   ondansetron ODT (Zofran-ODT) disintegrating tablet 4 mg (has no administration in " time range)     Or   ondansetron (Zofran) injection 4 mg (has no administration in time range)   melatonin tablet 3 mg (3 mg oral Given 7/31/25 2030)   benzocaine-menthol (Cepastat Sore Throat) lozenge 1 lozenge (1 lozenge Mouth/Throat Given 7/31/25 2031)   dexAMETHasone (Decadron) injection 10 mg (10 mg intravenous Given 7/31/25 1728)   remdesivir (Veklury) 200 mg in sodium chloride 0.9% 250 mL IV (200 mg intravenous New Bag 7/31/25 1727)     Followed by   remdesivir (Veklury) 100 mg in sodium chloride 0.9% 100 mL IV (has no administration in time range)   dextromethorphan-guaifenesin (Robitussin DM)  mg/5 mL oral liquid 5 mL (5 mL oral Given 8/1/25 0245)   guaiFENesin (Mucinex) 12 hr tablet 600 mg (600 mg oral Given 8/1/25 0851)   oxygen (O2) therapy (1 Dose inhalation Start 8/1/25 0817)   HYDROmorphone (Dilaudid) injection 1 mg (1 mg intravenous Given 7/31/25 1406)   sodium chloride 0.9 % bolus 500 mL (0 mL intravenous Stopped 7/31/25 1506)   ondansetron (Zofran) injection 4 mg (4 mg intravenous Given 7/31/25 1406)        Diagnostic/tests:  Labs Reviewed   CBC WITH AUTO DIFFERENTIAL - Abnormal       Result Value    WBC 6.9      nRBC 0.0      RBC 5.06      Hemoglobin 13.8      Hematocrit 45.2      MCV 89      MCH 27.3      MCHC 30.5 (*)     RDW 16.5 (*)     Platelets 156      Neutrophils % 77.5      Immature Granulocytes %, Automated 0.7      Lymphocytes % 6.5      Monocytes % 14.3      Eosinophils % 0.7      Basophils % 0.3      Neutrophils Absolute 5.35      Immature Granulocytes Absolute, Automated 0.05      Lymphocytes Absolute 0.45 (*)     Monocytes Absolute 0.99 (*)     Eosinophils Absolute 0.05      Basophils Absolute 0.02     COMPREHENSIVE METABOLIC PANEL - Abnormal    Glucose 82      Sodium 136      Potassium 4.3      Chloride 97 (*)     Bicarbonate 33 (*)     Anion Gap 10      Urea Nitrogen 43 (*)     Creatinine 1.70 (*)     eGFR 42 (*)     Calcium 8.6      Albumin 3.6      Alkaline Phosphatase 78       Total Protein 6.0 (*)     AST 21      Bilirubin, Total 0.6      ALT 23     B-TYPE NATRIURETIC PEPTIDE - Abnormal     (*)     Narrative:        <100 pg/mL - Heart failure unlikely  100-299 pg/mL - Intermediate probability of acute heart                  failure exacerbation. Correlate with clinical                  context and patient history.    >=300 pg/mL - Heart Failure likely. Correlate with clinical                  context and patient history.    BNP testing is performed using different testing methodology at [facility] than at other [facility]. Direct result comparisons should only be made within the same method.      BLOOD GAS VENOUS FULL PANEL - Abnormal    POCT pH, Venous 7.40      POCT pCO2, Venous 63 (*)     POCT pO2, Venous 47 (*)     POCT SO2, Venous 78 (*)     POCT Oxy Hemoglobin, Venous 76.4 (*)     POCT Hematocrit Calculated, Venous 43.0      POCT Sodium, Venous 132 (*)     POCT Potassium, Venous 4.4      POCT Chloride, Venous 96 (*)     POCT Ionized Calicum, Venous 1.16      POCT Glucose, Venous 89      POCT Lactate, Venous 1.2      POCT Base Excess, Venous 11.4 (*)     POCT HCO3 Calculated, Venous 39.0 (*)     POCT Hemoglobin, Venous 14.3      POCT Anion Gap, Venous 1.0 (*)     Patient Temperature 37.0      FiO2 28     SARS-COV-2 AND INFLUENZA A/B PCR - Abnormal    Flu A Result Not Detected      Flu B Result Not Detected      Coronavirus 2019, PCR Detected (*)     Narrative:     This assay is an FDA-cleared, in vitro diagnostic nucleic acid amplification test for the qualitative detection and differentiation of SARS CoV-2/ Influenza A/B from nasopharyngeal specimens collected from individuals with signs and symptoms of respiratory tract infections, and has been validated for use at [facility]. Negative results do not preclude COVID-19/ Influenza A/B infections and should not be used as the sole basis for diagnosis, treatment, or other  management decisions. Testing for SARS CoV-2 is recommended only for patients who meet current clinical and/or epidemiological criteria defined by federal, state, or local public health directives.   SERIAL TROPONIN, 1 HOUR - Abnormal    Troponin I, High Sensitivity 22 (*)     Narrative:     Less than 99th percentile of normal range cutoff-  Female and children under 18 years old <14 ng/L; Male <21 ng/L: Negative  Repeat testing should be performed if clinically indicated.     Female and children under 18 years old 14-50 ng/L; Male 21-50 ng/L:  Consistent with possible cardiac damage and possible increased clinical   risk. Serial measurements may help to assess extent of myocardial damage.     >50 ng/L: Consistent with cardiac damage, increased clinical risk and  myocardial infarction. Serial measurements may help assess extent of   myocardial damage.      NOTE: Children less than 1 year old may have higher baseline troponin   levels and results should be interpreted in conjunction with the overall   clinical context.     NOTE: Troponin I testing is performed using a different   testing methodology at [facility] than at other   [facility]. Direct result comparisons should only   be made within the same method.   COMPREHENSIVE METABOLIC PANEL - Abnormal    Glucose 160 (*)     Sodium 137      Potassium 4.6      Chloride 99      Bicarbonate 32      Anion Gap 11      Urea Nitrogen 44 (*)     Creatinine 1.60 (*)     eGFR 45 (*)     Calcium 8.2 (*)     Albumin 3.5      Alkaline Phosphatase 79      Total Protein 6.0 (*)     AST 25      Bilirubin, Total 0.4      ALT 27     CBC - Abnormal    WBC 4.1 (*)     nRBC 0.0      RBC 5.25      Hemoglobin 14.4      Hematocrit 45.2      MCV 86      MCH 27.4      MCHC 31.9 (*)     RDW 16.0 (*)     Platelets 156     MAGNESIUM - Normal    Magnesium 2.14     AMMONIA - Normal    Ammonia 34     SERIAL TROPONIN-INITIAL - Normal    Troponin I, High Sensitivity 20       Narrative:     Less than 99th percentile of normal range cutoff-  Female and children under 18 years old <14 ng/L; Male <21 ng/L: Negative  Repeat testing should be performed if clinically indicated.     Female and children under 18 years old 14-50 ng/L; Male 21-50 ng/L:  Consistent with possible cardiac damage and possible increased clinical   risk. Serial measurements may help to assess extent of myocardial damage.     >50 ng/L: Consistent with cardiac damage, increased clinical risk and  myocardial infarction. Serial measurements may help assess extent of   myocardial damage.      NOTE: Children less than 1 year old may have higher baseline troponin   levels and results should be interpreted in conjunction with the overall   clinical context.     NOTE: Troponin I testing is performed using a different   testing methodology at [facility] than at other   [facility]. Direct result comparisons should only   be made within the same method.   TROPONIN SERIES- (INITIAL, 1 HR)    Narrative:     The following orders were created for panel order Troponin I Series, High Sensitivity (0, 1 HR).  Procedure                               Abnormality         Status                     ---------                               -----------         ------                     Troponin I, High Sensiti...[[id number]]  Normal              Final result               Troponin, High Sensitivi...[[id number]]  Abnormal            Final result                 Please view results for these tests on the individual orders.   URINALYSIS WITH REFLEX CULTURE AND MICROSCOPIC    Narrative:     The following orders were created for panel order Urinalysis with Reflex Culture and Microscopic.  Procedure                               Abnormality         Status                     ---------                               -----------         ------                     Urinalysis with Reflex C...[[id number]]                                                  Extra Urine Gray Tube[[id number]]                                                         Please view results for these tests on the individual orders.   DRUG SCREEN,URINE   URINALYSIS WITH REFLEX CULTURE AND MICROSCOPIC   EXTRA URINE GRAY TUBE        CT head wo IV contrast   Final Result   No evidence of acute cortical infarct or intracranial hemorrhage.        No evidence of intracranial hemorrhage or displaced skull fracture.        MACRO:   None.        Signed by: Maribel Lock 7/31/2025 2:05 PM   Dictation workstation:   [id number]      XR chest 1 view   Final Result        Cardiomegaly unchanged. Mild central interstitial edema suggested.        No large consolidation.        Signed by: Chad Werner 7/31/2025 12:34 PM   Dictation workstation:   [id number]             [facility] Summary:  COVID PCR is positive.  No evidence of pneumonia on chest x-ray.  There is no leukocytosis or anemia.  There is some CO2 retention, patient is well compensated.  Kidney function appears at baseline.  Head CT with no acute findings.  Given patient's persistent altered mental status, will admit to medicine for further management.      Procedure  Procedures         [1]   Past Medical History:  Diagnosis Date    Anxiety     Chronic diastolic heart failure     Chronic respiratory failure with hypoxia     Coronary artery disease     Depression     DVT (deep venous thrombosis) (Multi)     Gout     History of pulmonary embolus (PE)     Hypertension     MI (myocardial infarction) (Multi)     Obstructive sleep apnea     Paroxysmal atrial fibrillation (Multi)     Seizure (Multi)     Stroke (Multi)    [2]   Past Surgical History:  Procedure Laterality Date    CHOLECYSTECTOMY      COLECTOMY PARTIAL / TOTAL      partial colectomy    MR HEAD ANGIO WO IV CONTRAST  07/07/2015    MR HEAD ANGIO WO IV CONTRAST [facility]    MR HEAD ANGIO WO IV CONTRAST  07/09/2015    MR HEAD ANGIO WO IV CONTRAST [facility]    TOTAL  KNEE ARTHROPLASTY Right    [3]   Family History  Problem Relation Name Age of Onset    Pancreatic cancer Father      Hypertension Father      Hypertension Sister      Hypertension Brother     [4]   Social History  Tobacco Use    Smoking status: Former     Types: Cigarettes    Smokeless tobacco: Never   Substance Use Topics    Alcohol use: Not Currently    Drug use: Never   [5]   Past Medical History:  Diagnosis Date    Anxiety     Chronic diastolic heart failure     Chronic respiratory failure with hypoxia     Coronary artery disease     Depression     DVT (deep venous thrombosis) (Multi)     Gout     History of pulmonary embolus (PE)     Hypertension     MI (myocardial infarction) (Multi)     Obstructive sleep apnea     Paroxysmal atrial fibrillation (Multi)     Seizure (Multi)     Stroke (Multi)    [6]   Past Surgical History:  Procedure Laterality Date    CHOLECYSTECTOMY      COLECTOMY PARTIAL / TOTAL      partial colectomy    MR HEAD ANGIO WO IV CONTRAST  07/07/2015    MR HEAD ANGIO WO IV CONTRAST [facility]    MR HEAD ANGIO WO IV CONTRAST  07/09/2015    MR HEAD ANGIO WO IV CONTRAST [facility]    TOTAL KNEE ARTHROPLASTY Right    [7]   Social History  Tobacco Use    Smoking status: Former     Types: Cigarettes    Smokeless tobacco: Never   Substance Use Topics    Alcohol use: Not Currently    Drug use: Never   [8]   Family History  Problem Relation Name Age of Onset    Pancreatic cancer Father      Hypertension Father      Hypertension Sister      Hypertension Brother     [9]   Allergies  Allergen Reactions    Cephalosporins Swelling    Codeine Nausea/vomiting    Penicillins Swelling    Tramadol Nausea/vomiting    Adhesive Tape-Silicones Rash        Jimmy Estrella PA-C  08/01/25 1122

## 2025-08-01 ENCOUNTER — APPOINTMENT (OUTPATIENT)
Dept: CARDIOLOGY | Facility: HOSPITAL | Age: 70
DRG: 177 | End: 2025-08-01
Payer: COMMERCIAL

## 2025-08-01 PROBLEM — N18.9 CKD (CHRONIC KIDNEY DISEASE): Status: ACTIVE | Noted: 2025-08-01

## 2025-08-01 PROBLEM — I10 HTN (HYPERTENSION): Status: ACTIVE | Noted: 2025-08-01

## 2025-08-01 PROBLEM — U07.1 COVID-19: Status: ACTIVE | Noted: 2025-08-01

## 2025-08-01 LAB
ALBUMIN SERPL BCP-MCNC: 3.5 G/DL (ref 3.4–5)
ALP SERPL-CCNC: 79 U/L (ref 33–136)
ALT SERPL W P-5'-P-CCNC: 27 U/L (ref 10–52)
AMPHETAMINES UR QL SCN: ABNORMAL
ANION GAP SERPL CALCULATED.3IONS-SCNC: 11 MMOL/L (ref 10–20)
APPEARANCE UR: CLEAR
AST SERPL W P-5'-P-CCNC: 25 U/L (ref 9–39)
BARBITURATES UR QL SCN: ABNORMAL
BENZODIAZ UR QL SCN: ABNORMAL
BILIRUB SERPL-MCNC: 0.4 MG/DL (ref 0–1.2)
BILIRUB UR STRIP.AUTO-MCNC: NEGATIVE MG/DL
BUN SERPL-MCNC: 44 MG/DL (ref 6–23)
BZE UR QL SCN: ABNORMAL
CALCIUM SERPL-MCNC: 8.2 MG/DL (ref 8.6–10.3)
CANNABINOIDS UR QL SCN: ABNORMAL
CHLORIDE SERPL-SCNC: 99 MMOL/L (ref 98–107)
CO2 SERPL-SCNC: 32 MMOL/L (ref 21–32)
COLOR UR: ABNORMAL
CREAT SERPL-MCNC: 1.6 MG/DL (ref 0.5–1.3)
EGFRCR SERPLBLD CKD-EPI 2021: 45 ML/MIN/1.73M*2
ERYTHROCYTE [DISTWIDTH] IN BLOOD BY AUTOMATED COUNT: 16 % (ref 11.5–14.5)
FENTANYL+NORFENTANYL UR QL SCN: ABNORMAL
GLUCOSE SERPL-MCNC: 160 MG/DL (ref 74–99)
GLUCOSE UR STRIP.AUTO-MCNC: ABNORMAL MG/DL
HCT VFR BLD AUTO: 45.2 % (ref 41–52)
HGB BLD-MCNC: 14.4 G/DL (ref 13.5–17.5)
KETONES UR STRIP.AUTO-MCNC: NEGATIVE MG/DL
LEUKOCYTE ESTERASE UR QL STRIP.AUTO: ABNORMAL
MCH RBC QN AUTO: 27.4 PG (ref 26–34)
MCHC RBC AUTO-ENTMCNC: 31.9 G/DL (ref 32–36)
MCV RBC AUTO: 86 FL (ref 80–100)
METHADONE UR QL SCN: ABNORMAL
MUCOUS THREADS #/AREA URNS AUTO: ABNORMAL /LPF
NITRITE UR QL STRIP.AUTO: NEGATIVE
NRBC BLD-RTO: 0 /100 WBCS (ref 0–0)
OPIATES UR QL SCN: ABNORMAL
OXYCODONE+OXYMORPHONE UR QL SCN: ABNORMAL
PCP UR QL SCN: ABNORMAL
PH UR STRIP.AUTO: 6 [PH]
PLATELET # BLD AUTO: 156 X10*3/UL (ref 150–450)
POTASSIUM SERPL-SCNC: 4.6 MMOL/L (ref 3.5–5.3)
PROT SERPL-MCNC: 6 G/DL (ref 6.4–8.2)
PROT UR STRIP.AUTO-MCNC: ABNORMAL MG/DL
RBC # BLD AUTO: 5.25 X10*6/UL (ref 4.5–5.9)
RBC # UR STRIP.AUTO: NEGATIVE MG/DL
RBC #/AREA URNS AUTO: ABNORMAL /HPF
SODIUM SERPL-SCNC: 137 MMOL/L (ref 136–145)
SP GR UR STRIP.AUTO: 1.02
UROBILINOGEN UR STRIP.AUTO-MCNC: NORMAL MG/DL
WBC # BLD AUTO: 4.1 X10*3/UL (ref 4.4–11.3)
WBC #/AREA URNS AUTO: ABNORMAL /HPF

## 2025-08-01 PROCEDURE — 94640 AIRWAY INHALATION TREATMENT: CPT

## 2025-08-01 PROCEDURE — 2500000004 HC RX 250 GENERAL PHARMACY W/ HCPCS (ALT 636 FOR OP/ED): Performed by: INTERNAL MEDICINE

## 2025-08-01 PROCEDURE — 99232 SBSQ HOSP IP/OBS MODERATE 35: CPT | Performed by: INTERNAL MEDICINE

## 2025-08-01 PROCEDURE — 2500000005 HC RX 250 GENERAL PHARMACY W/O HCPCS: Performed by: INTERNAL MEDICINE

## 2025-08-01 PROCEDURE — 81001 URINALYSIS AUTO W/SCOPE: CPT

## 2025-08-01 PROCEDURE — 80307 DRUG TEST PRSMV CHEM ANLYZR: CPT

## 2025-08-01 PROCEDURE — 85027 COMPLETE CBC AUTOMATED: CPT | Performed by: INTERNAL MEDICINE

## 2025-08-01 PROCEDURE — 97165 OT EVAL LOW COMPLEX 30 MIN: CPT | Mod: GO

## 2025-08-01 PROCEDURE — 2500000001 HC RX 250 WO HCPCS SELF ADMINISTERED DRUGS (ALT 637 FOR MEDICARE OP): Performed by: REGISTERED NURSE

## 2025-08-01 PROCEDURE — 2500000004 HC RX 250 GENERAL PHARMACY W/ HCPCS (ALT 636 FOR OP/ED): Performed by: REGISTERED NURSE

## 2025-08-01 PROCEDURE — 1200000002 HC GENERAL ROOM WITH TELEMETRY DAILY

## 2025-08-01 PROCEDURE — 97161 PT EVAL LOW COMPLEX 20 MIN: CPT | Mod: GP

## 2025-08-01 PROCEDURE — 9420000001 HC RT PATIENT EDUCATION 5 MIN

## 2025-08-01 PROCEDURE — 87077 CULTURE AEROBIC IDENTIFY: CPT | Mod: WESLAB

## 2025-08-01 PROCEDURE — 2500000001 HC RX 250 WO HCPCS SELF ADMINISTERED DRUGS (ALT 637 FOR MEDICARE OP): Performed by: INTERNAL MEDICINE

## 2025-08-01 PROCEDURE — 80053 COMPREHEN METABOLIC PANEL: CPT | Performed by: INTERNAL MEDICINE

## 2025-08-01 PROCEDURE — 2500000002 HC RX 250 W HCPCS SELF ADMINISTERED DRUGS (ALT 637 FOR MEDICARE OP, ALT 636 FOR OP/ED): Performed by: INTERNAL MEDICINE

## 2025-08-01 PROCEDURE — 36415 COLL VENOUS BLD VENIPUNCTURE: CPT | Performed by: INTERNAL MEDICINE

## 2025-08-01 PROCEDURE — 93005 ELECTROCARDIOGRAM TRACING: CPT

## 2025-08-01 RX ORDER — OXYCODONE HYDROCHLORIDE 5 MG/1
5 TABLET ORAL EVERY 6 HOURS PRN
Refills: 0 | Status: DISCONTINUED | OUTPATIENT
Start: 2025-08-01 | End: 2025-08-02

## 2025-08-01 RX ORDER — DEXAMETHASONE 6 MG/1
6 TABLET ORAL DAILY
Status: DISCONTINUED | OUTPATIENT
Start: 2025-08-01 | End: 2025-08-03 | Stop reason: HOSPADM

## 2025-08-01 RX ORDER — GUAIFENESIN 600 MG/1
600 TABLET, EXTENDED RELEASE ORAL 2 TIMES DAILY
Status: DISCONTINUED | OUTPATIENT
Start: 2025-08-01 | End: 2025-08-03 | Stop reason: HOSPADM

## 2025-08-01 RX ORDER — GUAIFENESIN/DEXTROMETHORPHAN 100-10MG/5
5 SYRUP ORAL EVERY 4 HOURS PRN
Status: DISCONTINUED | OUTPATIENT
Start: 2025-08-01 | End: 2025-08-03 | Stop reason: HOSPADM

## 2025-08-01 RX ADMIN — GUAIFENESIN 600 MG: 600 TABLET ORAL at 21:31

## 2025-08-01 RX ADMIN — OXYCODONE HYDROCHLORIDE 10 MG: 5 TABLET ORAL at 15:18

## 2025-08-01 RX ADMIN — POLYETHYLENE GLYCOL 3350 17 G: 17 POWDER, FOR SOLUTION ORAL at 08:51

## 2025-08-01 RX ADMIN — LEVETIRACETAM 750 MG: 250 TABLET, FILM COATED ORAL at 21:31

## 2025-08-01 RX ADMIN — BUDESONIDE 0.5 MG: 0.5 INHALANT RESPIRATORY (INHALATION) at 08:12

## 2025-08-01 RX ADMIN — DEXAMETHASONE 6 MG: 6 TABLET ORAL at 14:37

## 2025-08-01 RX ADMIN — OXYCODONE HYDROCHLORIDE 10 MG: 5 TABLET ORAL at 04:25

## 2025-08-01 RX ADMIN — GUAIFENESIN 600 MG: 600 TABLET ORAL at 02:45

## 2025-08-01 RX ADMIN — TRAZODONE HYDROCHLORIDE 200 MG: 100 TABLET ORAL at 21:32

## 2025-08-01 RX ADMIN — BUDESONIDE 0.5 MG: 0.5 INHALANT RESPIRATORY (INHALATION) at 00:20

## 2025-08-01 RX ADMIN — GUAIFENESIN SYRUP AND DEXTROMETHORPHAN 5 ML: 100; 10 SYRUP ORAL at 21:32

## 2025-08-01 RX ADMIN — CARVEDILOL 3.12 MG: 3.12 TABLET, FILM COATED ORAL at 21:31

## 2025-08-01 RX ADMIN — FORMOTEROL FUMARATE 20 MCG: 20 SOLUTION RESPIRATORY (INHALATION) at 08:12

## 2025-08-01 RX ADMIN — LEVETIRACETAM 750 MG: 250 TABLET, FILM COATED ORAL at 08:51

## 2025-08-01 RX ADMIN — GUAIFENESIN SYRUP AND DEXTROMETHORPHAN 5 ML: 100; 10 SYRUP ORAL at 02:45

## 2025-08-01 RX ADMIN — Medication 3 MG: at 21:31

## 2025-08-01 RX ADMIN — SODIUM CHLORIDE 100 MG: 9 INJECTION, SOLUTION INTRAVENOUS at 17:47

## 2025-08-01 RX ADMIN — APIXABAN 5 MG: 5 TABLET, FILM COATED ORAL at 08:50

## 2025-08-01 RX ADMIN — BUDESONIDE 0.5 MG: 0.5 INHALANT RESPIRATORY (INHALATION) at 19:38

## 2025-08-01 RX ADMIN — ACETAMINOPHEN 650 MG: 325 TABLET ORAL at 23:23

## 2025-08-01 RX ADMIN — Medication 1 DOSE: at 08:17

## 2025-08-01 RX ADMIN — APIXABAN 5 MG: 5 TABLET, FILM COATED ORAL at 21:32

## 2025-08-01 RX ADMIN — ONDANSETRON 4 MG: 4 TABLET, ORALLY DISINTEGRATING ORAL at 21:31

## 2025-08-01 RX ADMIN — OXYCODONE HYDROCHLORIDE 10 MG: 5 TABLET ORAL at 21:31

## 2025-08-01 RX ADMIN — FORMOTEROL FUMARATE 20 MCG: 20 SOLUTION RESPIRATORY (INHALATION) at 00:20

## 2025-08-01 RX ADMIN — GUAIFENESIN 600 MG: 600 TABLET ORAL at 08:51

## 2025-08-01 RX ADMIN — FORMOTEROL FUMARATE 20 MCG: 20 SOLUTION RESPIRATORY (INHALATION) at 19:38

## 2025-08-01 RX ADMIN — CARVEDILOL 3.12 MG: 3.12 TABLET, FILM COATED ORAL at 08:50

## 2025-08-01 RX ADMIN — ALLOPURINOL 300 MG: 300 TABLET ORAL at 08:51

## 2025-08-01 RX ADMIN — BUPROPION HYDROCHLORIDE 150 MG: 150 TABLET, EXTENDED RELEASE ORAL at 08:51

## 2025-08-01 RX ADMIN — OXYCODONE HYDROCHLORIDE 10 MG: 5 TABLET ORAL at 10:10

## 2025-08-01 RX ADMIN — Medication: at 22:00

## 2025-08-01 SDOH — SOCIAL STABILITY: SOCIAL INSECURITY: DO YOU FEEL UNSAFE GOING BACK TO THE PLACE WHERE YOU ARE LIVING?: NO

## 2025-08-01 SDOH — SOCIAL STABILITY: SOCIAL INSECURITY: ARE YOU OR HAVE YOU BEEN THREATENED OR ABUSED PHYSICALLY, EMOTIONALLY, OR SEXUALLY BY ANYONE?: NO

## 2025-08-01 SDOH — SOCIAL STABILITY: SOCIAL INSECURITY: WERE YOU ABLE TO COMPLETE ALL THE BEHAVIORAL HEALTH SCREENINGS?: YES

## 2025-08-01 SDOH — SOCIAL STABILITY: SOCIAL INSECURITY: DOES ANYONE TRY TO KEEP YOU FROM HAVING/CONTACTING OTHER FRIENDS OR DOING THINGS OUTSIDE YOUR HOME?: NO

## 2025-08-01 SDOH — SOCIAL STABILITY: SOCIAL INSECURITY: ABUSE: ADULT

## 2025-08-01 SDOH — SOCIAL STABILITY: SOCIAL INSECURITY: DO YOU FEEL ANYONE HAS EXPLOITED OR TAKEN ADVANTAGE OF YOU FINANCIALLY OR OF YOUR PERSONAL PROPERTY?: NO

## 2025-08-01 SDOH — SOCIAL STABILITY: SOCIAL INSECURITY: ARE THERE ANY APPARENT SIGNS OF INJURIES/BEHAVIORS THAT COULD BE RELATED TO ABUSE/NEGLECT?: NO

## 2025-08-01 SDOH — SOCIAL STABILITY: SOCIAL INSECURITY: HAVE YOU HAD THOUGHTS OF HARMING ANYONE ELSE?: NO

## 2025-08-01 SDOH — SOCIAL STABILITY: SOCIAL INSECURITY: HAS ANYONE EVER THREATENED TO HURT YOUR FAMILY OR YOUR PETS?: NO

## 2025-08-01 SDOH — SOCIAL STABILITY: SOCIAL INSECURITY: HAVE YOU HAD ANY THOUGHTS OF HARMING ANYONE ELSE?: NO

## 2025-08-01 ASSESSMENT — PAIN - FUNCTIONAL ASSESSMENT
PAIN_FUNCTIONAL_ASSESSMENT: 0-10

## 2025-08-01 ASSESSMENT — PAIN DESCRIPTION - ORIENTATION
ORIENTATION: MID
ORIENTATION: MID

## 2025-08-01 ASSESSMENT — COGNITIVE AND FUNCTIONAL STATUS - GENERAL
WALKING IN HOSPITAL ROOM: A LITTLE
HELP NEEDED FOR BATHING: A LITTLE
DAILY ACTIVITIY SCORE: 24
MOVING TO AND FROM BED TO CHAIR: A LITTLE
DRESSING REGULAR LOWER BODY CLOTHING: A LITTLE
MOBILITY SCORE: 22
TURNING FROM BACK TO SIDE WHILE IN FLAT BAD: A LITTLE
DAILY ACTIVITIY SCORE: 20
WALKING IN HOSPITAL ROOM: A LITTLE
PATIENT BASELINE BEDBOUND: NO
CLIMB 3 TO 5 STEPS WITH RAILING: A LOT
PERSONAL GROOMING: A LITTLE
MOBILITY SCORE: 18
CLIMB 3 TO 5 STEPS WITH RAILING: A LITTLE
TOILETING: A LITTLE
STANDING UP FROM CHAIR USING ARMS: A LITTLE
MOBILITY SCORE: 23
CLIMB 3 TO 5 STEPS WITH RAILING: A LITTLE
DAILY ACTIVITIY SCORE: 24

## 2025-08-01 ASSESSMENT — PAIN SCALES - GENERAL
PAINLEVEL_OUTOF10: 5 - MODERATE PAIN
PAINLEVEL_OUTOF10: 10 - WORST POSSIBLE PAIN
PAINLEVEL_OUTOF10: 7
PAINLEVEL_OUTOF10: 7
PAINLEVEL_OUTOF10: 3
PAINLEVEL_OUTOF10: 8
PAINLEVEL_OUTOF10: 0 - NO PAIN
PAINLEVEL_OUTOF10: 0 - NO PAIN
PAINLEVEL_OUTOF10: 6
PAINLEVEL_OUTOF10: 0 - NO PAIN
PAINLEVEL_OUTOF10: 5 - MODERATE PAIN

## 2025-08-01 ASSESSMENT — ACTIVITIES OF DAILY LIVING (ADL)
ADL_ASSISTANCE: INDEPENDENT
GROOMING: INDEPENDENT
ADEQUATE_TO_COMPLETE_ADL: YES
JUDGMENT_ADEQUATE_SAFELY_COMPLETE_DAILY_ACTIVITIES: YES
TOILETING: INDEPENDENT
WALKS IN HOME: INDEPENDENT
HEARING - LEFT EAR: FUNCTIONAL
HEARING - RIGHT EAR: FUNCTIONAL
PATIENT'S MEMORY ADEQUATE TO SAFELY COMPLETE DAILY ACTIVITIES?: YES
DRESSING YOURSELF: INDEPENDENT
FEEDING YOURSELF: INDEPENDENT
BATHING: INDEPENDENT
ADL_ASSISTANCE: INDEPENDENT
BATHING_ASSISTANCE: MINIMAL

## 2025-08-01 ASSESSMENT — PAIN DESCRIPTION - DESCRIPTORS
DESCRIPTORS: ACHING
DESCRIPTORS: SHARP
DESCRIPTORS: ACHING
DESCRIPTORS: SHARP
DESCRIPTORS: ACHING

## 2025-08-01 ASSESSMENT — LIFESTYLE VARIABLES
HOW MANY STANDARD DRINKS CONTAINING ALCOHOL DO YOU HAVE ON A TYPICAL DAY: PATIENT DOES NOT DRINK
AUDIT-C TOTAL SCORE: 0
SUBSTANCE_ABUSE_PAST_12_MONTHS: NO
HOW OFTEN DO YOU HAVE 6 OR MORE DRINKS ON ONE OCCASION: NEVER
SKIP TO QUESTIONS 9-10: 1
AUDIT-C TOTAL SCORE: 0
PRESCIPTION_ABUSE_PAST_12_MONTHS: NO
HOW OFTEN DO YOU HAVE A DRINK CONTAINING ALCOHOL: NEVER

## 2025-08-01 ASSESSMENT — PAIN DESCRIPTION - LOCATION
LOCATION: CHEST
LOCATION: CHEST
LOCATION: OTHER (COMMENT)
LOCATION: OTHER (COMMENT)

## 2025-08-01 NOTE — PROGRESS NOTES
Kapil MICHAEL Child is a 70 y.o. male on day 1 of admission presenting with Delirium.      Subjective   No worsening subjective shortness of breath, though he states he feels short of breath.  He is actually on 2 L saturating well this morning.    Complaining of chest pain with deep breaths.           Objective     Last Recorded Vitals  /69 (BP Location: Left arm, Patient Position: Lying)   Pulse 72   Temp 36.4 °C (97.5 °F) (Oral)   Resp 20   Wt (!) 153 kg (336 lb 6.8 oz)   SpO2 96%   Intake/Output last 3 Shifts:    Intake/Output Summary (Last 24 hours) at 8/1/2025 1429  Last data filed at 8/1/2025 1000  Gross per 24 hour   Intake 740 ml   Output 3200 ml   Net -2460 ml       Admission Weight  Weight: (!) 153 kg (336 lb 6.8 oz) (07/31/25 1138)    Daily Weight  07/31/25 : (!) 153 kg (336 lb 6.8 oz)    Image Results  Electrocardiogram, 12-lead PRN ACS symptoms  Sinus rhythm with 1st degree AV block with frequent Premature ventricular complexes  Left axis deviation  Nonspecific intraventricular block  Minimal voltage criteria for LVH, may be normal variant ( Castillo product )  Abnormal ECG  When compared with ECG of 26-JUL-2025 19:19,  Premature ventricular complexes are now Present  Nonspecific intraventricular block has replaced Left bundle branch block      Physical Exam  General: Elderly male.  Appears generally better today, less ill-appearing.  Eyes: Clear sclera  Neck: No JVD.  Increased comfort grossly.  Cardio: Regular rate and rhythm  Respiratory: No distress.  Diminished bilaterally.  Nasal cannula.  Abdomen: Soft and nondistended  Extremities: Chronic venous stasis changes bilateral lower extremities.    Relevant Results                            Assessment & Plan  Delirium  Metabolic encephalopathy secondary to COVID-19, much improved if not completely resolved.  No unilateral symptoms and speech changes more similar to encephalopathy than true dysarthria  COVID-19  Positive today  Patient is on is  here for there is nasal cannula, and I will continue Decadron  Continue remdesivir.  Discussed with infectious disease, and will do a dose of remdesivir tomorrow and can discharge after.  CKD (chronic kidney disease)  Remained stable.  HTN (hypertension)  Patient is borderline hypotensive currently  Will continue carvedilol, but will continue to hold the other antihypertensives  Anticipate possible discharge home tomorrow.             Jacob Mustafa, DO

## 2025-08-01 NOTE — ASSESSMENT & PLAN NOTE
Positive today  Patient is on the same 4 L of oxygen, I still feel pertinent to cover with Decadron for the time being until seen by infectious disease  Will also start remdesivir.  Renal function is somewhat marginal but I think we are within the window and he is high risk
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
## 2025-08-01 NOTE — ASSESSMENT & PLAN NOTE
Metabolic encephalopathy secondary to COVID-19, much improved if not completely resolved.  No unilateral symptoms and speech changes more similar to encephalopathy than true dysarthria

## 2025-08-01 NOTE — H&P
"History Of Present Illness  Kapil MICHAEL Child is a 70 y.o. male presenting with confusion.  Patient was recently discharged from this facility after he was admitted with chest pain, found to be noncardiac.  He was doing well, but on the night before presentation his wife stated that he had a appear confused and hard time finding some words.  No facial droop.  No listing to one side or for noted unilateral weakness.  He is on 4 L nasal cannula regularly, no increased to his oxygen rate.  In the emergency department, CT was unremarkable, but COVID testing did return positive.  They do not believe that the patient has been vaccinated this year, but he has received 3 prior COVID vaccinations.     Past Medical History  Medical History[1]    Surgical History  Surgical History[2]     Social History  He reports that he has quit smoking. His smoking use included cigarettes. He has never used smokeless tobacco. He reports that he does not currently use alcohol. He reports that he does not use drugs.    Family History  Family History[3]     Allergies  Cephalosporins, Codeine, Penicillins, Tramadol, and Adhesive tape-silicones    Review of Systems  Otherwise unremarkable  Physical Exam  General: Elderly male, morbidly obese obese, mild acute distress  Eyes: Clear sclera  Neck: No JVD  Skin: Flushed  Cardio: Regular rate rhythm respiratory: 4 L nasal cannula.  Bilateral some diminished  Abdomen: Sulfamycin  Neuro: Patient appears moderately confused.  No facial droop.  Strength is intact bilateral upper and lower extremities and symmetric.  Last Recorded Vitals  Blood pressure 105/71, pulse 78, temperature 36.9 °C (98.4 °F), temperature source Oral, resp. rate 20, height 1.93 m (6' 4\"), weight (!) 153 kg (336 lb 6.8 oz), SpO2 98%.    Relevant Results      Results for orders placed or performed during the hospital encounter of 07/31/25 (from the past 24 hours)   CBC and Auto Differential   Result Value Ref Range    WBC 6.9 4.4 - " 11.3 x10*3/uL    nRBC 0.0 0.0 - 0.0 /100 WBCs    RBC 5.06 4.50 - 5.90 x10*6/uL    Hemoglobin 13.8 13.5 - 17.5 g/dL    Hematocrit 45.2 41.0 - 52.0 %    MCV 89 80 - 100 fL    MCH 27.3 26.0 - 34.0 pg    MCHC 30.5 (L) 32.0 - 36.0 g/dL    RDW 16.5 (H) 11.5 - 14.5 %    Platelets 156 150 - 450 x10*3/uL    Neutrophils % 77.5 40.0 - 80.0 %    Immature Granulocytes %, Automated 0.7 0.0 - 0.9 %    Lymphocytes % 6.5 13.0 - 44.0 %    Monocytes % 14.3 2.0 - 10.0 %    Eosinophils % 0.7 0.0 - 6.0 %    Basophils % 0.3 0.0 - 2.0 %    Neutrophils Absolute 5.35 1.60 - 5.50 x10*3/uL    Immature Granulocytes Absolute, Automated 0.05 0.00 - 0.50 x10*3/uL    Lymphocytes Absolute 0.45 (L) 0.80 - 3.00 x10*3/uL    Monocytes Absolute 0.99 (H) 0.05 - 0.80 x10*3/uL    Eosinophils Absolute 0.05 0.00 - 0.40 x10*3/uL    Basophils Absolute 0.02 0.00 - 0.10 x10*3/uL   Comprehensive metabolic panel   Result Value Ref Range    Glucose 82 74 - 99 mg/dL    Sodium 136 136 - 145 mmol/L    Potassium 4.3 3.5 - 5.3 mmol/L    Chloride 97 (L) 98 - 107 mmol/L    Bicarbonate 33 (H) 21 - 32 mmol/L    Anion Gap 10 10 - 20 mmol/L    Urea Nitrogen 43 (H) 6 - 23 mg/dL    Creatinine 1.70 (H) 0.50 - 1.30 mg/dL    eGFR 42 (L) >60 mL/min/1.73m*2    Calcium 8.6 8.6 - 10.3 mg/dL    Albumin 3.6 3.4 - 5.0 g/dL    Alkaline Phosphatase 78 33 - 136 U/L    Total Protein 6.0 (L) 6.4 - 8.2 g/dL    AST 21 9 - 39 U/L    Bilirubin, Total 0.6 0.0 - 1.2 mg/dL    ALT 23 10 - 52 U/L   Magnesium   Result Value Ref Range    Magnesium 2.14 1.60 - 2.40 mg/dL   B-Type Natriuretic Peptide   Result Value Ref Range     (H) 0 - 99 pg/mL   Blood Gas Venous Full Panel   Result Value Ref Range    POCT pH, Venous 7.40 7.33 - 7.43 pH    POCT pCO2, Venous 63 (H) 41 - 51 mm Hg    POCT pO2, Venous 47 (H) 35 - 45 mm Hg    POCT SO2, Venous 78 (H) 45 - 75 %    POCT Oxy Hemoglobin, Venous 76.4 (H) 45.0 - 75.0 %    POCT Hematocrit Calculated, Venous 43.0 41.0 - 52.0 %    POCT Sodium, Venous 132 (L) 136  - 145 mmol/L    POCT Potassium, Venous 4.4 3.5 - 5.3 mmol/L    POCT Chloride, Venous 96 (L) 98 - 107 mmol/L    POCT Ionized Calicum, Venous 1.16 1.10 - 1.33 mmol/L    POCT Glucose, Venous 89 74 - 99 mg/dL    POCT Lactate, Venous 1.2 0.4 - 2.0 mmol/L    POCT Base Excess, Venous 11.4 (H) -2.0 - 3.0 mmol/L    POCT HCO3 Calculated, Venous 39.0 (H) 22.0 - 26.0 mmol/L    POCT Hemoglobin, Venous 14.3 13.5 - 17.5 g/dL    POCT Anion Gap, Venous 1.0 (L) 10.0 - 25.0 mmol/L    Patient Temperature 37.0 degrees Celsius    FiO2 28 %   Ammonia   Result Value Ref Range    Ammonia 34 16 - 53 umol/L   Troponin I, High Sensitivity, Initial   Result Value Ref Range    Troponin I, High Sensitivity 20 0 - 20 ng/L   Sars-CoV-2 and Influenza A/B PCR   Result Value Ref Range    Flu A Result Not Detected Not Detected    Flu B Result Not Detected Not Detected    Coronavirus 2019, PCR Detected (A) Not Detected   Troponin, High Sensitivity, 1 Hour   Result Value Ref Range    Troponin I, High Sensitivity 22 (H) 0 - 20 ng/L   Comprehensive metabolic panel   Result Value Ref Range    Glucose 160 (H) 74 - 99 mg/dL    Sodium 137 136 - 145 mmol/L    Potassium 4.6 3.5 - 5.3 mmol/L    Chloride 99 98 - 107 mmol/L    Bicarbonate 32 21 - 32 mmol/L    Anion Gap 11 10 - 20 mmol/L    Urea Nitrogen 44 (H) 6 - 23 mg/dL    Creatinine 1.60 (H) 0.50 - 1.30 mg/dL    eGFR 45 (L) >60 mL/min/1.73m*2    Calcium 8.2 (L) 8.6 - 10.3 mg/dL    Albumin 3.5 3.4 - 5.0 g/dL    Alkaline Phosphatase 79 33 - 136 U/L    Total Protein 6.0 (L) 6.4 - 8.2 g/dL    AST 25 9 - 39 U/L    Bilirubin, Total 0.4 0.0 - 1.2 mg/dL    ALT 27 10 - 52 U/L   CBC   Result Value Ref Range    WBC 4.1 (L) 4.4 - 11.3 x10*3/uL    nRBC 0.0 0.0 - 0.0 /100 WBCs    RBC 5.25 4.50 - 5.90 x10*6/uL    Hemoglobin 14.4 13.5 - 17.5 g/dL    Hematocrit 45.2 41.0 - 52.0 %    MCV 86 80 - 100 fL    MCH 27.4 26.0 - 34.0 pg    MCHC 31.9 (L) 32.0 - 36.0 g/dL    RDW 16.0 (H) 11.5 - 14.5 %    Platelets 156 150 - 450 x10*3/uL          Assessment & Plan  Delirium  Metabolic encephalopathy secondary to COVID-19 which is known to cause such issues  Without unilateral findings, and I am not really detecting dysarthria, do not feel that we need to do stroke workup at this point but will monitor  Supportive measures  COVID-19  Positive today  Patient is on the same 4 L of oxygen, I still feel pertinent to cover with Decadron for the time being until seen by infectious disease  Will also start remdesivir.  Renal function is somewhat marginal but I think we are within the window and he is high risk  CKD (chronic kidney disease)  Creatinine is largely stable at 1.6.  HTN (hypertension)  Patient is borderline hypotensive currently  Will continue carvedilol, but will continue to hold the other antihypertensives      Jacob Mustafa DO         [1]   Past Medical History:  Diagnosis Date    Anxiety     Chronic diastolic heart failure     Chronic respiratory failure with hypoxia     Coronary artery disease     Depression     DVT (deep venous thrombosis) (Multi)     Gout     History of pulmonary embolus (PE)     Hypertension     MI (myocardial infarction) (Multi)     Obstructive sleep apnea     Paroxysmal atrial fibrillation (Multi)     Seizure (Multi)     Stroke (Multi)    [2]   Past Surgical History:  Procedure Laterality Date    CHOLECYSTECTOMY      COLECTOMY PARTIAL / TOTAL      partial colectomy    MR HEAD ANGIO WO IV CONTRAST  07/07/2015    MR HEAD ANGIO WO IV CONTRAST [facility]    MR HEAD ANGIO WO IV CONTRAST  07/09/2015    MR HEAD ANGIO WO IV CONTRAST [facility]    TOTAL KNEE ARTHROPLASTY Right    [3]   Family History  Problem Relation Name Age of Onset    Pancreatic cancer Father      Hypertension Father      Hypertension Sister      Hypertension Brother

## 2025-08-01 NOTE — ASSESSMENT & PLAN NOTE
Metabolic encephalopathy secondary to COVID-19 which is known to cause such issues  Without unilateral findings, and I am not really detecting dysarthria, do not feel that we need to do stroke workup at this point but will monitor  Supportive measures
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
## 2025-08-01 NOTE — ASSESSMENT & PLAN NOTE
Positive today  Patient is on is here for there is nasal cannula, and I will continue Decadron  Continue remdesivir.  Discussed with infectious disease, and will do a dose of remdesivir tomorrow and can discharge after.

## 2025-08-01 NOTE — PROGRESS NOTES
Occupational Therapy    Evaluation    Patient Name: Kapil Luna  MRN: [id number]  Department: [facility]  Room: [facility] [place]  Today's Date: 8/1/2025  Time Calculation  Start Time: 1107  Stop Time: 1124  Time Calculation (min): 17 min        Assessment:  OT Assessment: pt presents with generalized weakness, reduced endurance and decreased balance which impedes ADL performance. pt would benefit from skilled OT services to address these deficits and to facilitate highest level of independence.  Prognosis: Good  Barriers to Discharge Home: No anticipated barriers  Evaluation/Treatment Tolerance: Patient limited by fatigue  Medical Staff Made Aware: Yes  End of Session Communication: Bedside nurse  End of Session Patient Position: Bed, 2 rail up, Alarm off, not on at start of session  OT Assessment Results: Decreased ADL status, Decreased endurance, Decreased functional mobility  Prognosis: Good  Evaluation/Treatment Tolerance: Patient limited by fatigue  Medical Staff Made Aware: Yes  Strengths: Ability to acquire knowledge, Attitude of self  Barriers to Participation: Comorbidities  Plan:  Treatment Interventions: ADL retraining, Functional transfer training, UE strengthening/ROM, Endurance training, Equipment evaluation/education, Compensatory technique education  OT Frequency: 2 times per week  OT Discharge Recommendations: Low intensity level of continued care  Equipment Recommended upon Discharge: Straight cane  OT Recommended Transfer Status: Stand by assist  OT - OK to Discharge: Yes  Treatment Interventions: ADL retraining, Functional transfer training, UE strengthening/ROM, Endurance training, Equipment evaluation/education, Compensatory technique education    Subjective     OT Visit Info:  OT Received On: 08/01/25  General:  General  Reason for Referral: ADL impairment; 70 yr old male presenting w/ Metabolic encephalopathy secondary to COVID-19  Past Medical History Relevant to Rehab: chronic respiratory failure,  CHF, CAD, CKD, COPD, PAF, anxiety, seizure disorder, CVA, RASHID, DVT  Family/Caregiver Present: No  Prior to Session Communication: Bedside nurse  Patient Position Received: Bed, 2 rail up, Alarm off, not on at start of session  Preferred Learning Style: verbal, visual  General Comment: pt agreeable and cooperative, stating feeling much better today  Precautions:  Medical Precautions: Fall precautions, Oxygen therapy device and L/min (2L), Isolation precautions     Date/Time Vitals Session Patient Position Pulse Resp SpO2 BP MAP (mmHg)    08/01/25 1107 During OT  --  72  --  96 %  --  --            Pain:  Pain Assessment  Pain Assessment: 0-10  0-10 (Numeric) Pain Score: 0 - No pain    Objective   Cognition:  Overall Cognitive Status: Within Functional Limits  Orientation Level: Oriented X4  Safety/Judgement: Within Functional Limits           Home Living:  Type of Home: House  Lives With: Spouse  Home Adaptive Equipment: Cane (Rollator)  Home Layout: Multi-level, Stairs to alternate level with rails, Stairs to alternate level without rails  Alternate Level Stairs-Rails:  (single HR)  Alternate Level Stairs-Number of Steps: 12 to upstairs bed/bath  Home Access: Stairs to enter with rails  Entrance Stairs-Rails:  (single HR)  Entrance Stairs-Number of Steps: 4  Bathroom Shower/Tub: Walk-in shower  Bathroom Toilet: Standard  Bathroom Equipment: Grab bars in shower, Shower chair with back  Bathroom Accessibility: 1/2 bath on first floor, full bath upstairs  Home Living Comments: pt stating he usually sleeps on first floor recliner at home  Prior Function:  Level of [place]: Independent with ADLs and functional transfers, Independent with homemaking with ambulation  ADL Assistance: Independent  Homemaking Assistance: Independent (shares w/ spouse)  Ambulatory Assistance: Independent (cane PRN, rollator in community)  Vocational: Retired  Prior Function Comments: +Driving     ADL:  Eating Assistance:  Independent  Grooming Assistance: Stand by (pt performed hand washing while standing at bathroom sink w/o a device at SBA)  Bathing Assistance: Minimal (anticipated)  UE Dressing Assistance: Independent (anticipated)  LE Dressing Assistance: Minimal (anticipated)  Toileting Assistance with Device: Stand by (pt required SBA when standing for clothing management during toileting task)  Activity Tolerance:  Endurance: Tolerates less than 10 min exercise, no significant change in vital signs  Bed Mobility/Transfers: Bed Mobility  Bed Mobility: Yes  Bed Mobility 1  Bed Mobility 1: Supine to sitting, Sitting to supine  Level of Assistance 1: Modified independent  Bed Mobility Comments 1: HOB elevated and use of bed rails    Transfers  Transfer: Yes  Transfer 1  Technique 1: Sit to stand  Transfer Level of Assistance 1: Close supervision  Trials/Comments 1: from edge of bed, fair control when ascending/descending  Transfers 2  Transfer to 2: Toilet  Technique 2: Sit to stand, Stand to sit  Transfer Level of Assistance 2: Close supervision  Trials/Comments 2: transferred to toilet in bathroom; utilized grab bar to ascend/descend at SBA  Transfers 3  Transfer to 3: Bed  Technique 3: Stand to sit  Transfer Level of Assistance 3: Close supervision  Trials/Comments 3: pt transferred back to bed at end of session after ambulating from bathroom without a device      Functional Mobility:  Functional Mobility  Functional Mobility Performed: Yes  Functional Mobility 1  Surface 1: Level tile  Device 1: No device  Assistance 1: Close supervision  Comments 1: to/from bathroom without a device; required SBA for safety due to lateral sway however no significant LOB noted.  mild fatigue observed at end of trial with mild SOB  Sitting Balance:  Static Sitting Balance  Static Sitting-Balance Support: Feet supported  Static Sitting-Level of Assistance: Independent  Standing Balance:  Static Standing Balance  Static Standing-Balance Support:  No upper extremity supported  Static Standing-Level of Assistance: Close supervision      Vision:Vision - Basic Assessment  Current Vision: Wears glasses only for reading  Sensation:  Light Touch: No apparent deficits  Strength:  Strength Comments: CATERINA 4/5  Perception:  Inattention/Neglect: Appears intact  Coordination:  Movements are Fluid and Coordinated: Yes   Hand Function:  Gross Grasp: Functional  Coordination: Functional  Extremities: RUE   RUE : Within Functional Limits and LUE   LUE: Within Functional Limits    Outcome Measures:[facility] Daily Activity  Putting on and taking off regular lower body clothing: A little  Bathing (including washing, rinsing, drying): A little  Putting on and taking off regular upper body clothing: None  Toileting, which includes using toilet, bedpan or urinal: A little  Taking care of personal grooming such as brushing teeth: A little  Eating Meals: None  Daily Activity - Total Score: 20        Education Documentation  Body Mechanics, taught by Brian Krishnan OT at 8/1/2025 11:38 AM.  Learner: Patient  Readiness: Acceptance  Method: Explanation  Response: Verbalizes Understanding  Comment: ADL/functional mobility techniques, facilitating OOB activity, OT POC    ADL Training, taught by Brian Krishnan OT at 8/1/2025 11:38 AM.  Learner: Patient  Readiness: Acceptance  Method: Explanation  Response: Verbalizes Understanding  Comment: ADL/functional mobility techniques, facilitating OOB activity, OT POC    Education Comments  No comments found.        OP EDUCATION:       Goals:  Encounter Problems       Encounter Problems (Active)       ADLs       Patient with complete lower body dressing with modified independent level of assistance donning and doffing all LE clothes  with PRN adaptive equipment while edge of bed  (Progressing)       Start:  08/01/25    Expected End:  09/01/25            Patient will complete daily grooming tasks with independent level of assistance and PRN adaptive  equipment while standing. (Progressing)       Start:  08/01/25    Expected End:  09/01/25            Patient will complete toileting including hygiene clothing management/hygiene with modified independent level of assistance and grab bars. (Progressing)       Start:  08/01/25    Expected End:  09/01/25               MOBILITY       Patient will perform Functional mobility max Household distances/Community Distances with modified independent level of assistance and least restrictive device in order to improve safety and functional mobility. (Progressing)       Start:  08/01/25    Expected End:  09/01/25

## 2025-08-01 NOTE — CARE PLAN
Problem: Respiratory  Goal: Clear secretions with interventions this shift  8/1/2025 1940 by Francis Parsons RRT  Outcome: Progressing  8/1/2025 1940 by Francis Parsons RRT  Outcome: Progressing  Goal: Minimize anxiety/maximize coping throughout shift  8/1/2025 1940 by Francis Parsons RRT  Outcome: Progressing  8/1/2025 1940 by Francis Parsons RRT  Outcome: Progressing  Goal: Minimal/no exertional discomfort or dyspnea this shift  Outcome: Progressing

## 2025-08-01 NOTE — ASSESSMENT & PLAN NOTE
Patient is borderline hypotensive currently  Will continue carvedilol, but will continue to hold the other antihypertensives

## 2025-08-01 NOTE — PROGRESS NOTES
Physical Therapy    Physical Therapy Evaluation    Patient Name: Kapil Luna  MRN: [id number]  Department: [address]  Room: [facility]421-  Today's Date: 8/1/2025   Time Calculation  Start Time: 1210  Stop Time: 1230  Time Calculation (min): 20 min    Assessment/Plan   PT Assessment  PT Assessment Results: Decreased strength, Decreased endurance, Impaired balance, Decreased mobility, Obesity, Pain  Rehab Prognosis: Good  Barriers to Discharge Home: No anticipated barriers  Evaluation/Treatment Tolerance: Patient limited by fatigue, Other (Comment) (SOB)  Medical Staff Made Aware: Yes  Strengths: Premorbid level of function  Barriers to Participation: Comorbidities  End of Session Communication: Bedside nurse  Assessment Comment: pt required close supervision to contact guar dof 1 for the above limited mobility. Pt demonstrates decreased strength, balance, endurance, mobility ease as compared to baseline however pt should progress well enough for safe d/c home with assist from spouse PRN, use of appropriate device for amb safety and low int rehab. Will continue to benefit from skilled PT services while in acute care.  End of Session Patient Position: Bed, 2 rail up, Bed, 3 rail up, Alarm off, not on at start of session (all needs in reach)  IP OR SWING BED PT PLAN  Inpatient or Swing Bed: Inpatient  PT Plan  Treatment/Interventions: Bed mobility, Transfer training, Gait training, Stair training, Balance training, Strengthening, Endurance training, Therapeutic exercise, Therapeutic activity  PT Plan: Ongoing PT  PT Frequency: 3 times per week  PT Discharge Recommendations: Low intensity level of continued care  Equipment Recommended upon Discharge: Other (comment) (cane vs rollator)  PT Recommended Transfer Status: Contact guard, Other (comment) (cane vs HDFWW PRN)  PT - OK to Discharge: Yes    Subjective     PT Visit Info:  PT Received On: 08/01/25  General Visit Information:  General  Reason for Referral: impaired  mobility; + COVID  Referred By: Dr Mustafa  Past Medical History Relevant to Rehab: chronic respiratory failure, CHF, CAD, CKD, COPD, PAF, anxiety, seizure disorder, CVA, RASHID, DVT  Family/Caregiver Present: No  Prior to Session Communication: Bedside nurse  Patient Position Received: Bed, 2 rail up, Alarm off, not on at start of session  Preferred Learning Style: verbal, visual  General Comment: 71 yo WM admitted to [facility] via ED  7/31/25 with c/o KENDRA art P was founf to be + COVID; Pt was just discharged from [facility] 3 days ago when was admitted for noncardiac chest pain. Cleared by nurse for therapy; pt agreeable to therapy; + telemetry.  Home Living:  Home Living  Type of Home: House  Lives With: Spouse  Home Adaptive Equipment: Cane, Other (Comment) (rollator; pt uses 4 liters o2 at home during the day; Bipap at night)  Home Layout: Multi-level, Stairs to alternate level with rails, Stairs to alternate level without rails, 1/2 bath on main level  Alternate Level Stairs-Rails:  (unilateral)  Alternate Level Stairs-Number of Steps: 12 steps up to bedroom/full bath  Home Access: Stairs to enter with rails  Entrance Stairs-Rails:  (unilateral)  Entrance Stairs-Number of Steps: 4  Bathroom Shower/Tub: Walk-in shower  Bathroom Toilet: Standard  Bathroom Equipment: Grab bars in shower, Shower chair with back  Bathroom Accessibility: 1/2 bath on first floor, full bath upstairs  Home Living Comments: pt reports sleeping in recliner on 1st floor  Prior Level of Function:  Prior Function Per Pt/Caregiver Report  Level of [place]: Independent with ADLs and functional transfers, Needs assistance with homemaking  Receives Help From: Family  ADL Assistance: Independent  Homemaking Assistance: Needs assistance (shares chores with spouse)  Ambulatory Assistance: Independent (cane vs rollator)  Vocational: Retired  Prior Function Comments: pt denies falls; + drives; spouse assists with  "meds  Precautions:  Precautions  Hearing/Visual Limitations: reading glasses  Medical Precautions: Fall precautions, Infection precautions, Oxygen therapy device and L/min, Seizure precautions (+ COVID; 2 liters o2 via nc)  Precautions Comment: + contact plus precautions      Date/Time Vitals Session Patient Position Pulse Resp SpO2 BP MAP (mmHg)    08/01/25 1107 During OT  --  70  --  96 %  --  --     08/01/25 1210 During PT  --  --  --  --  --  --      Vital Signs Comment: O2 sat 96% with HR 68 bpm in long sitting; o2 sat 90% with HR 84 bpm during amb trial; Recovered to 94% with seated pursed lip breathing x 1 min---2 liters o2     Objective   Pain:  Pain Assessment  Pain Assessment: 0-10  0-10 (Numeric) Pain Score: 5 - Moderate pain  Pain Type: Acute pain  Pain Location: Chest  Pain Orientation: Right, Anterior, Other (Comment) (pt states, \" It feels like lung pain with deeper breathing\")  Pain Interventions: Repositioned  Response to Interventions: Decrease in pain  Cognition:  Cognition  Overall Cognitive Status: Within Functional Limits  Orientation Level: Oriented X4  Memory: Exceptions to WFL  Short-Term Memory: Impaired  Safety/Judgement: Within Functional Limits    General Assessments:  General Observation  General Observation: pleasant, cooperative, kenneth lower legs with chronic darkened skin color and mild generalized edema               Activity Tolerance  Endurance: Decreased tolerance for upright activites  Activity Tolerance Comments: fair due to SOB, fatigue    Sensation  Light Touch: No apparent deficits    Strength  Strength Comments: kenneth hips 3-/5, knees 4-/5, ankles 3+/5  Coordination  Movements are Fluid and Coordinated: Yes    Postural Control  Posture Comment: morbidly obese with mild forward head, protracted shldrs    Static Sitting Balance  Static Sitting-Balance Support: Feet supported  Static Sitting-Level of Assistance: Distant supervision  Static Sitting-Comment/Number of Minutes: " good  Dynamic Sitting Balance  Dynamic Sitting-Balance Support: Feet supported  Dynamic Sitting-Level of Assistance: Close supervision  Dynamic Sitting-Comments: good -    Static Standing Balance  Static Standing-Balance Support: No upper extremity supported  Static Standing-Level of Assistance: Close supervision  Static Standing-Comment/Number of Minutes: good  Dynamic Standing Balance  Dynamic Standing-Level of Assistance: Contact guard  Dynamic Standing-Comments: good -  Functional Assessments:  Bed Mobility  Bed Mobility: Yes  Bed Mobility 1  Bed Mobility 1: Supine to sitting, Sitting to supine  Level of Assistance 1: Distant supervision  Bed Mobility Comments 1: head of bed elevated; use of bedrail    Transfers  Transfer: Yes  Transfer 1  Transfer From 1: Bed to  Transfer to 1: Stand  Technique 1: Sit to stand  Transfer Level of Assistance 1: Close supervision  Trials/Comments 1: no loss of balance  Transfers 2  Transfer From 2: Stand to  Transfer to 2: Bed  Technique 2: Stand to sit  Transfer Level of Assistance 2: Close supervision  Trials/Comments 2: no loss of balance    Ambulation/Gait Training  Ambulation/Gait Training Performed: Yes  Ambulation/Gait Training 1  Surface 1: Level tile  Device 1: No device  Assistance 1: Contact guard, Minimal verbal cues  Quality of Gait 1: Diminished heel strike (slight waddle)  Comments/Distance (ft) 1: 10 ft x 2 in room, + turns, verbal cues for pursed lip breathing    Stairs  Stairs: No  Extremity/Trunk Assessments:  Cervical Spine   Cervical Spine:  (mild forward head)  RLE   RLE :  (see above strength comments)  LLE   LLE :  (see above strength comments)  Outcome Measures:  [facility] Basic Mobility  Turning from your back to your side while in a flat bed without using bedrails: None  Moving from lying on your back to sitting on the side of a flat bed without using bedrails: A little  Moving to and from bed to chair (including a wheelchair): A little  Standing up from a  chair using your arms (e.g. wheelchair or bedside chair): A little  To walk in hospital room: A little  Climbing 3-5 steps with railing: A lot  Basic Mobility - Total Score: 18    Encounter Problems       Encounter Problems (Active)       Balance       STG - Maintains dynamic standing balance with upper extremity support (Progressing)       Start:  08/01/25    Expected End:  08/29/25       INTERVENTIONS:  1. Practice standing with minimal support.  2. Educate patient about standing tolerance.  3. Educate patient about independence with gait, transfers, and ADL's.  4. Educate patient about use of assistive device.  5. Educate patient about self-directed care.            Mobility       STG - Patient will ambulate 100 ft, LRAD, + turns, mod ind with O2 sat > 92% (Progressing)       Start:  08/01/25    Expected End:  08/29/25            pt ascends/descends 4 steps with 1 rail, mod ind (Progressing)       Start:  08/01/25    Expected End:  08/29/25               PT Transfers       STG - Patient to transfer to and from sit to supine mod ind (Progressing)       Start:  08/01/25    Expected End:  08/29/25            STG - Patient will transfer sit to and from stand mod ind (Progressing)       Start:  08/01/25    Expected End:  08/29/25               Pain       pt will verbalize no > 3/10 pain during functional mobility (Progressing)       Start:  08/01/25    Expected End:  08/29/25                   Education Documentation  Mobility Training, taught by Joy Aldridge PT at 8/1/2025 12:55 PM.  Learner: Patient  Readiness: Acceptance  Method: Explanation  Response: Verbalizes Understanding  Comment: PT educated pt in importance of active participation during PT intervention    Education Comments  No comments found.

## 2025-08-02 ENCOUNTER — APPOINTMENT (OUTPATIENT)
Dept: RADIOLOGY | Facility: HOSPITAL | Age: 70
DRG: 177 | End: 2025-08-02
Payer: COMMERCIAL

## 2025-08-02 PROBLEM — N39.0 UTI (URINARY TRACT INFECTION): Status: ACTIVE | Noted: 2025-08-02

## 2025-08-02 LAB
ALBUMIN SERPL BCP-MCNC: 3.2 G/DL (ref 3.4–5)
ANION GAP SERPL CALCULATED.3IONS-SCNC: 7 MMOL/L (ref 10–20)
ATRIAL RATE: 85 BPM
BUN SERPL-MCNC: 40 MG/DL (ref 6–23)
CALCIUM SERPL-MCNC: 8.3 MG/DL (ref 8.6–10.3)
CHLORIDE SERPL-SCNC: 103 MMOL/L (ref 98–107)
CO2 SERPL-SCNC: 34 MMOL/L (ref 21–32)
CREAT SERPL-MCNC: 1.1 MG/DL (ref 0.5–1.3)
EGFRCR SERPLBLD CKD-EPI 2021: 71 ML/MIN/1.73M*2
ERYTHROCYTE [DISTWIDTH] IN BLOOD BY AUTOMATED COUNT: 15.9 % (ref 11.5–14.5)
GLUCOSE SERPL-MCNC: 122 MG/DL (ref 74–99)
HCT VFR BLD AUTO: 42.5 % (ref 41–52)
HGB BLD-MCNC: 13.3 G/DL (ref 13.5–17.5)
MCH RBC QN AUTO: 27.4 PG (ref 26–34)
MCHC RBC AUTO-ENTMCNC: 31.3 G/DL (ref 32–36)
MCV RBC AUTO: 88 FL (ref 80–100)
NRBC BLD-RTO: 0 /100 WBCS (ref 0–0)
P AXIS: 72 DEGREES
P OFFSET: 154 MS
P ONSET: 109 MS
PHOSPHATE SERPL-MCNC: 2.9 MG/DL (ref 2.5–4.9)
PLATELET # BLD AUTO: 164 X10*3/UL (ref 150–450)
POTASSIUM SERPL-SCNC: 5 MMOL/L (ref 3.5–5.3)
PR INTERVAL: 220 MS
Q ONSET: 219 MS
QRS COUNT: 14 BEATS
QRS DURATION: 134 MS
QT INTERVAL: 430 MS
QTC CALCULATION(BAZETT): 511 MS
QTC FREDERICIA: 483 MS
R AXIS: -48 DEGREES
RBC # BLD AUTO: 4.85 X10*6/UL (ref 4.5–5.9)
SODIUM SERPL-SCNC: 139 MMOL/L (ref 136–145)
T AXIS: 72 DEGREES
T OFFSET: 434 MS
VENTRICULAR RATE: 85 BPM
WBC # BLD AUTO: 9.6 X10*3/UL (ref 4.4–11.3)

## 2025-08-02 PROCEDURE — 71045 X-RAY EXAM CHEST 1 VIEW: CPT | Mod: FOREIGN READ | Performed by: RADIOLOGY

## 2025-08-02 PROCEDURE — 2500000001 HC RX 250 WO HCPCS SELF ADMINISTERED DRUGS (ALT 637 FOR MEDICARE OP): Performed by: REGISTERED NURSE

## 2025-08-02 PROCEDURE — 71045 X-RAY EXAM CHEST 1 VIEW: CPT

## 2025-08-02 PROCEDURE — 99232 SBSQ HOSP IP/OBS MODERATE 35: CPT | Performed by: INTERNAL MEDICINE

## 2025-08-02 PROCEDURE — 94640 AIRWAY INHALATION TREATMENT: CPT

## 2025-08-02 PROCEDURE — 2500000004 HC RX 250 GENERAL PHARMACY W/ HCPCS (ALT 636 FOR OP/ED): Performed by: INTERNAL MEDICINE

## 2025-08-02 PROCEDURE — 94664 DEMO&/EVAL PT USE INHALER: CPT

## 2025-08-02 PROCEDURE — 2500000005 HC RX 250 GENERAL PHARMACY W/O HCPCS: Performed by: INTERNAL MEDICINE

## 2025-08-02 PROCEDURE — 36415 COLL VENOUS BLD VENIPUNCTURE: CPT | Performed by: INTERNAL MEDICINE

## 2025-08-02 PROCEDURE — 80069 RENAL FUNCTION PANEL: CPT | Performed by: INTERNAL MEDICINE

## 2025-08-02 PROCEDURE — 85027 COMPLETE CBC AUTOMATED: CPT | Performed by: INTERNAL MEDICINE

## 2025-08-02 PROCEDURE — 2500000002 HC RX 250 W HCPCS SELF ADMINISTERED DRUGS (ALT 637 FOR MEDICARE OP, ALT 636 FOR OP/ED): Performed by: INTERNAL MEDICINE

## 2025-08-02 PROCEDURE — 1200000002 HC GENERAL ROOM WITH TELEMETRY DAILY

## 2025-08-02 PROCEDURE — 2500000004 HC RX 250 GENERAL PHARMACY W/ HCPCS (ALT 636 FOR OP/ED): Performed by: REGISTERED NURSE

## 2025-08-02 PROCEDURE — 2500000001 HC RX 250 WO HCPCS SELF ADMINISTERED DRUGS (ALT 637 FOR MEDICARE OP): Performed by: INTERNAL MEDICINE

## 2025-08-02 RX ORDER — HYDROCODONE BITARTRATE AND ACETAMINOPHEN 5; 325 MG/1; MG/1
1 TABLET ORAL EVERY 6 HOURS PRN
Refills: 0 | Status: DISCONTINUED | OUTPATIENT
Start: 2025-08-02 | End: 2025-08-03 | Stop reason: HOSPADM

## 2025-08-02 RX ORDER — LEVOFLOXACIN 500 MG/1
500 TABLET, FILM COATED ORAL ONCE
Status: COMPLETED | OUTPATIENT
Start: 2025-08-02 | End: 2025-08-02

## 2025-08-02 RX ADMIN — BUDESONIDE 0.5 MG: 0.5 INHALANT RESPIRATORY (INHALATION) at 07:22

## 2025-08-02 RX ADMIN — APIXABAN 5 MG: 5 TABLET, FILM COATED ORAL at 09:13

## 2025-08-02 RX ADMIN — LEVOFLOXACIN 500 MG: 500 TABLET, FILM COATED ORAL at 09:12

## 2025-08-02 RX ADMIN — Medication: at 22:00

## 2025-08-02 RX ADMIN — ALLOPURINOL 300 MG: 300 TABLET ORAL at 09:12

## 2025-08-02 RX ADMIN — BUDESONIDE 0.5 MG: 0.5 INHALANT RESPIRATORY (INHALATION) at 19:39

## 2025-08-02 RX ADMIN — GUAIFENESIN 600 MG: 600 TABLET ORAL at 20:16

## 2025-08-02 RX ADMIN — OXYCODONE HYDROCHLORIDE 10 MG: 5 TABLET ORAL at 04:36

## 2025-08-02 RX ADMIN — APIXABAN 5 MG: 5 TABLET, FILM COATED ORAL at 20:16

## 2025-08-02 RX ADMIN — GUAIFENESIN SYRUP AND DEXTROMETHORPHAN 5 ML: 100; 10 SYRUP ORAL at 20:16

## 2025-08-02 RX ADMIN — GUAIFENESIN 600 MG: 600 TABLET ORAL at 09:12

## 2025-08-02 RX ADMIN — BUPROPION HYDROCHLORIDE 150 MG: 150 TABLET, EXTENDED RELEASE ORAL at 09:12

## 2025-08-02 RX ADMIN — TRAZODONE HYDROCHLORIDE 200 MG: 100 TABLET ORAL at 20:16

## 2025-08-02 RX ADMIN — ACETAMINOPHEN 650 MG: 325 TABLET ORAL at 14:22

## 2025-08-02 RX ADMIN — ACETAMINOPHEN 650 MG: 325 TABLET ORAL at 20:16

## 2025-08-02 RX ADMIN — OXYCODONE HYDROCHLORIDE 10 MG: 5 TABLET ORAL at 10:56

## 2025-08-02 RX ADMIN — LEVETIRACETAM 750 MG: 250 TABLET, FILM COATED ORAL at 09:12

## 2025-08-02 RX ADMIN — SODIUM CHLORIDE 100 MG: 9 INJECTION, SOLUTION INTRAVENOUS at 13:19

## 2025-08-02 RX ADMIN — Medication 3 MG: at 20:16

## 2025-08-02 RX ADMIN — CARVEDILOL 3.12 MG: 3.12 TABLET, FILM COATED ORAL at 20:16

## 2025-08-02 RX ADMIN — FORMOTEROL FUMARATE 20 MCG: 20 SOLUTION RESPIRATORY (INHALATION) at 19:39

## 2025-08-02 RX ADMIN — LEVETIRACETAM 750 MG: 250 TABLET, FILM COATED ORAL at 20:16

## 2025-08-02 RX ADMIN — ONDANSETRON 4 MG: 4 TABLET, ORALLY DISINTEGRATING ORAL at 20:16

## 2025-08-02 RX ADMIN — ACETAMINOPHEN 650 MG: 325 TABLET ORAL at 09:14

## 2025-08-02 RX ADMIN — FORMOTEROL FUMARATE 20 MCG: 20 SOLUTION RESPIRATORY (INHALATION) at 07:22

## 2025-08-02 RX ADMIN — GUAIFENESIN SYRUP AND DEXTROMETHORPHAN 5 ML: 100; 10 SYRUP ORAL at 09:14

## 2025-08-02 RX ADMIN — DEXAMETHASONE 6 MG: 6 TABLET ORAL at 09:13

## 2025-08-02 ASSESSMENT — COGNITIVE AND FUNCTIONAL STATUS - GENERAL
CLIMB 3 TO 5 STEPS WITH RAILING: A LITTLE
MOBILITY SCORE: 23
MOBILITY SCORE: 23
DAILY ACTIVITIY SCORE: 24
CLIMB 3 TO 5 STEPS WITH RAILING: A LITTLE

## 2025-08-02 ASSESSMENT — PAIN SCALES - GENERAL
PAINLEVEL_OUTOF10: 0 - NO PAIN
PAINLEVEL_OUTOF10: 8
PAINLEVEL_OUTOF10: 0 - NO PAIN
PAINLEVEL_OUTOF10: 5 - MODERATE PAIN
PAINLEVEL_OUTOF10: 0 - NO PAIN
PAINLEVEL_OUTOF10: 2
PAINLEVEL_OUTOF10: 4

## 2025-08-02 ASSESSMENT — PAIN - FUNCTIONAL ASSESSMENT
PAIN_FUNCTIONAL_ASSESSMENT: 0-10

## 2025-08-02 ASSESSMENT — PAIN DESCRIPTION - DESCRIPTORS: DESCRIPTORS: ACHING

## 2025-08-02 NOTE — ASSESSMENT & PLAN NOTE
Patient has remained borderline hypotensive on the carvedilol  Will continue to hold other antihypertensives, and may be discharged without these medications

## 2025-08-02 NOTE — PROGRESS NOTES
Kapil MICHAEL Child is a 70 y.o. male on day 2 of admission presenting with Delirium.      Subjective   Patient states that he just does not feel generally well.  When asked about specific questions, he has some difficulty but states that his chest pain is persistent, which is a persistent issue for the patient.    He also states that he does not want to be a burden on his wife who herself is sick at home, no if it is COVID-19.    Patient is saturating mid 90s on nasal cannula.  He does not feel that he is ready to go home.           Objective     Last Recorded Vitals  BP 98/57 (BP Location: Left arm, Patient Position: Lying)   Pulse 56   Temp 36.4 °C (97.5 °F) (Oral)   Resp 16   Wt (!) 153 kg (336 lb 6.8 oz)   SpO2 97%   Intake/Output last 3 Shifts:    Intake/Output Summary (Last 24 hours) at 8/2/2025 1720  Last data filed at 8/2/2025 1426  Gross per 24 hour   Intake --   Output 1250 ml   Net -1250 ml       Admission Weight  Weight: (!) 153 kg (336 lb 6.8 oz) (07/31/25 1138)    Daily Weight  07/31/25 : (!) 153 kg (336 lb 6.8 oz)    Image Results  XR chest 1 view  Narrative: STUDY:  Chest Radiograph; 08/02/2025, 4:08 PM  INDICATION:  Cough.  COMPARISON:  CXR 07/31/2025.  ACCESSION NUMBER(S):  [id number]  ORDERING CLINICIAN:  JAYNE LIMON  TECHNIQUE:  Frontal chest was obtained at 16:08 hours.  FINDINGS:  Cardiac silhouette remains enlarged.  Mild pulmonary vascular congestion is unchanged significantly.   No significant pleural effusion.   No visible pneumothorax.   Impression: No significant interval change.  Signed by Jaime Benedict MD  Electrocardiogram, 12-lead PRN ACS symptoms  Sinus rhythm with 1st degree AV block with frequent Premature ventricular complexes  Left axis deviation  Nonspecific intraventricular block  Minimal voltage criteria for LVH, may be normal variant ( Castillo product )  Abnormal ECG  When compared with ECG of 26-JUL-2025 19:19,  Premature ventricular complexes are now  Present    Confirmed by Greg Gamez ([id number]) on 8/2/2025 2:30:58 PM      Physical Exam  General: Elderly male.  Appears generally better today, less ill-appearing.  Eyes: Clear sclera  Neck: No JVD.  Increased comfort grossly.  Cardio: Regular rate and rhythm  Respiratory: No distress.  Diminished bilaterally.  Nasal cannula.  Abdomen: Soft and nondistended  Extremities: Chronic venous stasis changes bilateral lower extremities.    Relevant Results                            Assessment & Plan  Delirium  Metabolic encephalopathy secondary to COVID-19, resolved  COVID-19  Patient is on is here for there is nasal cannula, and I will continue Decadron  Finished a 3-day course of remdesivir today  No worsening hypoxemia.  Discussed with the patient that COVID-19 will likely make him feel sick for a prolonged period of time  CKD (chronic kidney disease)  Proved today.  HTN (hypertension)  Patient has remained borderline hypotensive on the carvedilol  Will continue to hold other antihypertensives, and may be discharged without these medications  UTI (urinary tract infection)    Disposition: From medical standpoint, I do not see a great reason to keep the patient here.  There is subjective worsening, but his hypoxemia is actually improved, his creatinine is improved, and the patient is expected to feel sick with COVID-19.  With his comorbidities, I will keep him for observation today.  He was feeling more rundown today, and I do have a concern that there is overuse of the oxycodone which may be contributing this, and will decrease to [place].           Jacob Mustafa, DO
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
## 2025-08-02 NOTE — ASSESSMENT & PLAN NOTE
Patient is on is here for there is nasal cannula, and I will continue Decadron  Finished a 3-day course of remdesivir today  No worsening hypoxemia.  Discussed with the patient that COVID-19 will likely make him feel sick for a prolonged period of time

## 2025-08-02 NOTE — NURSING NOTE
Assumed care of pt. Pt resting in bed with no complaints. Pt drowsy at this time but will answer questions appropriately. Pt bed is low and locked. Call light within reach.

## 2025-08-03 VITALS
HEIGHT: 76 IN | TEMPERATURE: 98.1 F | OXYGEN SATURATION: 96 % | WEIGHT: 315 LBS | DIASTOLIC BLOOD PRESSURE: 75 MMHG | RESPIRATION RATE: 15 BRPM | BODY MASS INDEX: 38.36 KG/M2 | SYSTOLIC BLOOD PRESSURE: 137 MMHG | HEART RATE: 59 BPM

## 2025-08-03 LAB
ALBUMIN SERPL BCP-MCNC: 3.1 G/DL (ref 3.4–5)
ANION GAP SERPL CALCULATED.3IONS-SCNC: <7 MMOL/L (ref 10–20)
BASOPHILS # BLD AUTO: 0 X10*3/UL (ref 0–0.1)
BASOPHILS NFR BLD AUTO: 0 %
BUN SERPL-MCNC: 35 MG/DL (ref 6–23)
CALCIUM SERPL-MCNC: 8.4 MG/DL (ref 8.6–10.3)
CHLORIDE SERPL-SCNC: 104 MMOL/L (ref 98–107)
CO2 SERPL-SCNC: 35 MMOL/L (ref 21–32)
CREAT SERPL-MCNC: 1.02 MG/DL (ref 0.5–1.3)
EGFRCR SERPLBLD CKD-EPI 2021: 78 ML/MIN/1.73M*2
EOSINOPHIL # BLD AUTO: 0 X10*3/UL (ref 0–0.4)
EOSINOPHIL NFR BLD AUTO: 0 %
ERYTHROCYTE [DISTWIDTH] IN BLOOD BY AUTOMATED COUNT: 15.9 % (ref 11.5–14.5)
GLUCOSE SERPL-MCNC: 108 MG/DL (ref 74–99)
HCT VFR BLD AUTO: 45.3 % (ref 41–52)
HGB BLD-MCNC: 13.6 G/DL (ref 13.5–17.5)
IMM GRANULOCYTES # BLD AUTO: 0.05 X10*3/UL (ref 0–0.5)
IMM GRANULOCYTES NFR BLD AUTO: 0.5 % (ref 0–0.9)
LYMPHOCYTES # BLD AUTO: 0.53 X10*3/UL (ref 0.8–3)
LYMPHOCYTES NFR BLD AUTO: 5.7 %
MCH RBC QN AUTO: 27.3 PG (ref 26–34)
MCHC RBC AUTO-ENTMCNC: 30 G/DL (ref 32–36)
MCV RBC AUTO: 91 FL (ref 80–100)
MONOCYTES # BLD AUTO: 0.62 X10*3/UL (ref 0.05–0.8)
MONOCYTES NFR BLD AUTO: 6.7 %
NEUTROPHILS # BLD AUTO: 8.11 X10*3/UL (ref 1.6–5.5)
NEUTROPHILS NFR BLD AUTO: 87.1 %
NRBC BLD-RTO: 0 /100 WBCS (ref 0–0)
PHOSPHATE SERPL-MCNC: 2.3 MG/DL (ref 2.5–4.9)
PLATELET # BLD AUTO: 160 X10*3/UL (ref 150–450)
POTASSIUM SERPL-SCNC: 4.8 MMOL/L (ref 3.5–5.3)
RBC # BLD AUTO: 4.99 X10*6/UL (ref 4.5–5.9)
SODIUM SERPL-SCNC: 140 MMOL/L (ref 136–145)
WBC # BLD AUTO: 9.3 X10*3/UL (ref 4.4–11.3)

## 2025-08-03 PROCEDURE — 80069 RENAL FUNCTION PANEL: CPT | Performed by: INTERNAL MEDICINE

## 2025-08-03 PROCEDURE — 85025 COMPLETE CBC W/AUTO DIFF WBC: CPT | Performed by: INTERNAL MEDICINE

## 2025-08-03 PROCEDURE — 2500000002 HC RX 250 W HCPCS SELF ADMINISTERED DRUGS (ALT 637 FOR MEDICARE OP, ALT 636 FOR OP/ED): Performed by: INTERNAL MEDICINE

## 2025-08-03 PROCEDURE — 36415 COLL VENOUS BLD VENIPUNCTURE: CPT | Performed by: INTERNAL MEDICINE

## 2025-08-03 PROCEDURE — 94640 AIRWAY INHALATION TREATMENT: CPT

## 2025-08-03 PROCEDURE — 2500000004 HC RX 250 GENERAL PHARMACY W/ HCPCS (ALT 636 FOR OP/ED): Performed by: REGISTERED NURSE

## 2025-08-03 PROCEDURE — 2500000001 HC RX 250 WO HCPCS SELF ADMINISTERED DRUGS (ALT 637 FOR MEDICARE OP): Performed by: INTERNAL MEDICINE

## 2025-08-03 PROCEDURE — 2500000004 HC RX 250 GENERAL PHARMACY W/ HCPCS (ALT 636 FOR OP/ED): Performed by: INTERNAL MEDICINE

## 2025-08-03 RX ORDER — HYDROCODONE BITARTRATE AND ACETAMINOPHEN 5; 325 MG/1; MG/1
1 TABLET ORAL EVERY 6 HOURS PRN
Qty: 10 TABLET | Refills: 0 | Status: SHIPPED | OUTPATIENT
Start: 2025-08-03

## 2025-08-03 RX ORDER — DEXAMETHASONE 6 MG/1
6 TABLET ORAL DAILY
Qty: 2 TABLET | Refills: 0 | Status: SHIPPED | OUTPATIENT
Start: 2025-08-04 | End: 2025-08-03

## 2025-08-03 RX ORDER — LEVOFLOXACIN 500 MG/1
500 TABLET, FILM COATED ORAL DAILY
Qty: 2 TABLET | Refills: 0 | Status: SHIPPED | OUTPATIENT
Start: 2025-08-03 | End: 2025-08-05

## 2025-08-03 RX ORDER — LEVOFLOXACIN 500 MG/1
500 TABLET, FILM COATED ORAL DAILY
Qty: 2 TABLET | Refills: 0 | Status: SHIPPED | OUTPATIENT
Start: 2025-08-03 | End: 2025-08-03

## 2025-08-03 RX ORDER — DEXAMETHASONE 6 MG/1
6 TABLET ORAL DAILY
Qty: 2 TABLET | Refills: 0 | Status: SHIPPED | OUTPATIENT
Start: 2025-08-04

## 2025-08-03 RX ADMIN — ACETAMINOPHEN 650 MG: 325 TABLET ORAL at 02:09

## 2025-08-03 RX ADMIN — HYDROCODONE BITARTRATE AND ACETAMINOPHEN 1 TABLET: 5; 325 TABLET ORAL at 03:50

## 2025-08-03 RX ADMIN — GUAIFENESIN 600 MG: 600 TABLET ORAL at 08:56

## 2025-08-03 RX ADMIN — HYDROCODONE BITARTRATE AND ACETAMINOPHEN 1 TABLET: 5; 325 TABLET ORAL at 10:07

## 2025-08-03 RX ADMIN — FORMOTEROL FUMARATE 20 MCG: 20 SOLUTION RESPIRATORY (INHALATION) at 07:25

## 2025-08-03 RX ADMIN — ALLOPURINOL 300 MG: 300 TABLET ORAL at 08:56

## 2025-08-03 RX ADMIN — DEXAMETHASONE 6 MG: 6 TABLET ORAL at 08:56

## 2025-08-03 RX ADMIN — BUPROPION HYDROCHLORIDE 150 MG: 150 TABLET, EXTENDED RELEASE ORAL at 08:56

## 2025-08-03 RX ADMIN — BUDESONIDE 0.5 MG: 0.5 INHALANT RESPIRATORY (INHALATION) at 07:25

## 2025-08-03 RX ADMIN — ACETAMINOPHEN 650 MG: 325 TABLET ORAL at 06:29

## 2025-08-03 RX ADMIN — APIXABAN 5 MG: 5 TABLET, FILM COATED ORAL at 08:56

## 2025-08-03 RX ADMIN — LEVETIRACETAM 750 MG: 250 TABLET, FILM COATED ORAL at 08:56

## 2025-08-03 RX ADMIN — CARVEDILOL 3.12 MG: 3.12 TABLET, FILM COATED ORAL at 08:56

## 2025-08-03 ASSESSMENT — PAIN - FUNCTIONAL ASSESSMENT
PAIN_FUNCTIONAL_ASSESSMENT: 0-10

## 2025-08-03 ASSESSMENT — PAIN SCALES - GENERAL
PAINLEVEL_OUTOF10: 0 - NO PAIN
PAINLEVEL_OUTOF10: 7
PAINLEVEL_OUTOF10: 5 - MODERATE PAIN
PAINLEVEL_OUTOF10: 4

## 2025-08-03 ASSESSMENT — PAIN DESCRIPTION - LOCATION: LOCATION: CHEST

## 2025-08-03 ASSESSMENT — PAIN DESCRIPTION - DESCRIPTORS: DESCRIPTORS: ACHING

## 2025-08-03 ASSESSMENT — PAIN DESCRIPTION - ORIENTATION: ORIENTATION: MID

## 2025-08-03 NOTE — CARE PLAN
The patient's goals for the shift include      The clinical goals for the shift include PAIN CONTROL    Over the shift, the patient did not make progress toward the following goals. Barriers to progression include   . Recommendations to address these barriers include     Problem: Pain - Adult  Goal: Verbalizes/displays adequate comfort level or baseline comfort level  Outcome: Progressing     Problem: Safety - Adult  Goal: Free from fall injury  Outcome: Progressing     Problem: Discharge Planning  Goal: Discharge to home or other facility with appropriate resources  Outcome: Progressing     Problem: Chronic Conditions and Co-morbidities  Goal: Patient's chronic conditions and co-morbidity symptoms are monitored and maintained or improved  Outcome: Progressing     Problem: Nutrition  Goal: Nutrient intake appropriate for maintaining nutritional needs  Outcome: Progressing     Problem: Skin  Goal: Decreased wound size/increased tissue granulation at next dressing change  Outcome: Progressing  Goal: Participates in plan/prevention/treatment measures  Outcome: Progressing  Goal: Prevent/manage excess moisture  Outcome: Progressing  Goal: Prevent/minimize sheer/friction injuries  Outcome: Progressing  Goal: Promote/optimize nutrition  Outcome: Progressing  Goal: Promote skin healing  Outcome: Progressing     Problem: Respiratory  Goal: Clear secretions with interventions this shift  Outcome: Progressing  Goal: Minimize anxiety/maximize coping throughout shift  Outcome: Progressing  Goal: Minimal/no exertional discomfort or dyspnea this shift  Outcome: Progressing  Goal: No signs of respiratory distress (eg. Use of accessory muscles. Peds grunting)  Outcome: Progressing     Problem: Pain  Goal: Takes deep breaths with improved pain control throughout the shift  Outcome: Progressing  Goal: Turns in bed with improved pain control throughout the shift  Outcome: Progressing  Goal: Walks with improved pain control throughout  the shift  Outcome: Progressing  Goal: Performs ADL's with improved pain control throughout shift  Outcome: Progressing  Goal: Participates in PT with improved pain control throughout the shift  Outcome: Progressing  Goal: Free from opioid side effects throughout the shift  Outcome: Progressing  Goal: Free from acute confusion related to pain meds throughout the shift  Outcome: Progressing   .

## 2025-08-03 NOTE — PROGRESS NOTES
08/03/25 1317   Discharge Planning   Who is requesting discharge planning? Provider   Home or Post Acute Services In home services;Other (Comment)  (CM spoke to patient, choiced and amenable to [facility])   Type of Home Care Services Home PT   Expected Discharge Disposition Home H  (Referred to [facility] now pending ins clearance, acceptance, and soc)     CM spoke to patient by phone.     Dispo: Home with [facility] for home pt; pending start of care confirmation  DENYS today    Discharge plan NOT finalized. Please contact [facility] prior to attempting to discharge this patient. Thank you.

## 2025-08-03 NOTE — CARE PLAN
Problem: Pain - Adult  Goal: Verbalizes/displays adequate comfort level or baseline comfort level  Outcome: Progressing   The patient's goals for the shift include      The clinical goals for the shift include pain

## 2025-08-03 NOTE — PROGRESS NOTES
08/03/25 1327   Discharge Planning   Living Arrangements Spouse/significant other   Support Systems Spouse/significant other   Who is requesting discharge planning? Provider   Home or Post Acute Services In home services;Other (Comment)  (resumption of services: [facility]    [address], [address], [place] [address]. They will reach out in 24-48 hours for soc)   Type of Home Care Services Home PT   Expected Discharge Disposition Home Health  ([facility]    [address], [address], [place] [address]. Contact information placed on AVS)   Does the patient need discharge transport arranged? No     Dispo: resumption of [facility] for home PT, soc 24-48  DENYS today  No barriers to discharge

## 2025-08-03 NOTE — DISCHARGE INSTRUCTIONS
Please take it easy at home for the next few days.  COVID-19 does not kill nearly as many people as it did, but it still makes us feel quite sick for prolonged period time.    Your blood pressure regimen and diuretics has been changed while you are sick.  Please try to get an appointment with your primary care physician or with your cardiologist as soon as possible.  Please continue to hold your losartan and spironolactone until this appointment until instructed by your doctor.      For your torsemide, you can restart it in the middle of this coming week, but if you still have poor by mouth intake and feel that you are not fluid overloaded, you can hold and call your doctor.    Please establish with pain management for the chronic chest pain that has been evaluated at [facility].    You had some evidence of a UTI though somewhat questionable.  You were given a dose of Levaquin, and you have 2 more doses to finish the short course.      HOME CARE INFORMATION:  [facility] ([facility]    [phone] will call you and resume your homecare services for home PT in the next 48 hours. Please call them directly for questions or concerns.

## 2025-08-05 LAB — BACTERIA UR CULT: ABNORMAL
